# Patient Record
Sex: FEMALE | Race: BLACK OR AFRICAN AMERICAN | Employment: OTHER | URBAN - METROPOLITAN AREA
[De-identification: names, ages, dates, MRNs, and addresses within clinical notes are randomized per-mention and may not be internally consistent; named-entity substitution may affect disease eponyms.]

---

## 2017-02-23 RX ORDER — AMLODIPINE BESYLATE 10 MG/1
TABLET ORAL
Qty: 90 TABLET | Refills: 3 | Status: SHIPPED | OUTPATIENT
Start: 2017-02-23 | End: 2018-02-20 | Stop reason: SDUPTHER

## 2017-03-09 RX ORDER — SIMVASTATIN 20 MG
TABLET ORAL
Qty: 90 TABLET | Refills: 3 | Status: SHIPPED | OUTPATIENT
Start: 2017-03-09 | End: 2018-03-05 | Stop reason: SDUPTHER

## 2017-05-01 ENCOUNTER — OFFICE VISIT (OUTPATIENT)
Dept: INTERNAL MEDICINE | Age: 72
End: 2017-05-01

## 2017-05-01 VITALS
HEIGHT: 66 IN | SYSTOLIC BLOOD PRESSURE: 110 MMHG | WEIGHT: 234 LBS | HEART RATE: 70 BPM | BODY MASS INDEX: 37.61 KG/M2 | DIASTOLIC BLOOD PRESSURE: 70 MMHG | RESPIRATION RATE: 12 BRPM

## 2017-05-01 DIAGNOSIS — E78.5 HYPERLIPIDEMIA, UNSPECIFIED HYPERLIPIDEMIA TYPE: ICD-10-CM

## 2017-05-01 DIAGNOSIS — K63.5 COLON POLYPS: ICD-10-CM

## 2017-05-01 DIAGNOSIS — Z00.00 MEDICARE ANNUAL WELLNESS VISIT, SUBSEQUENT: Primary | ICD-10-CM

## 2017-05-01 DIAGNOSIS — Z13.29 ENCOUNTER FOR SCREENING FOR OTHER SUSPECTED ENDOCRINE DISORDER: ICD-10-CM

## 2017-05-01 DIAGNOSIS — E11.9 TYPE 2 DIABETES MELLITUS WITHOUT COMPLICATION, WITHOUT LONG-TERM CURRENT USE OF INSULIN (HCC): ICD-10-CM

## 2017-05-01 LAB
BILIRUBIN, POC: NORMAL
BLOOD URINE, POC: NORMAL
CLARITY, POC: CLEAR
COLOR, POC: YELLOW
CREATININE URINE: 197.5 MG/DL (ref 28–259)
GLUCOSE URINE, POC: NORMAL
KETONES, POC: NORMAL
LEUKOCYTE EST, POC: NORMAL
MICROALBUMIN UR-MCNC: 3.2 MG/DL
MICROALBUMIN/CREAT UR-RTO: 16.2 MG/G (ref 0–30)
NITRITE, POC: NORMAL
PH, POC: 5
PROTEIN, POC: NORMAL
SPECIFIC GRAVITY, POC: 1.02
UROBILINOGEN, POC: NORMAL

## 2017-05-01 PROCEDURE — G0439 PPPS, SUBSEQ VISIT: HCPCS | Performed by: INTERNAL MEDICINE

## 2017-05-01 PROCEDURE — 81002 URINALYSIS NONAUTO W/O SCOPE: CPT | Performed by: INTERNAL MEDICINE

## 2017-05-01 PROCEDURE — 93000 ELECTROCARDIOGRAM COMPLETE: CPT | Performed by: INTERNAL MEDICINE

## 2017-05-01 ASSESSMENT — LIFESTYLE VARIABLES: HOW OFTEN DO YOU HAVE A DRINK CONTAINING ALCOHOL: 0

## 2017-05-01 ASSESSMENT — ANXIETY QUESTIONNAIRES: GAD7 TOTAL SCORE: 2

## 2017-05-01 ASSESSMENT — PATIENT HEALTH QUESTIONNAIRE - PHQ9: SUM OF ALL RESPONSES TO PHQ QUESTIONS 1-9: 1

## 2017-05-02 LAB
ALBUMIN SERPL-MCNC: 4.5 G/DL (ref 3.5–5)
ALP BLD-CCNC: 97 IU/L (ref 35–135)
ALT SERPL-CCNC: 14 IU/L (ref 10–60)
AST SERPL-CCNC: 14 IU/L (ref 10–40)
BASOPHILS ABSOLUTE: 0 %
BASOPHILS ABSOLUTE: 0.04 THOU/MCL (ref 0.01–0.07)
BILIRUB SERPL-MCNC: 0.6 MG/DL (ref 0–1.2)
BUN BLDV-MCNC: 13 MG/DL (ref 8–26)
CALCIUM SERPL-MCNC: 9.8 MG/DL (ref 8.5–10.5)
CHLORIDE BLD-SCNC: 105 MEQ/L (ref 101–111)
CHOLESTEROL, TOTAL: 151 MG/DL
CHOLESTEROL/HDL RATIO: 2.7 (ref 1.9–4.2)
CO2: 23 MEQ/L (ref 24–36)
CREAT SERPL-MCNC: 0.97 MG/DL (ref 0.44–1.03)
CREATINE KINASE ISOENZYMES, SER/PLAS: 123 IU/L (ref 0–200)
EOSINOPHILS ABSOLUTE: 0.1 THOU/MCL (ref 0.03–0.45)
EOSINOPHILS RELATIVE PERCENT: 1 %
ESTIMATED AVERAGE GLUCOSE: 163 MG/DL
GFR AFRICAN AMERICAN: >60 ML/MIN/1.73 SQ METER
GFR NON-AFRICAN AMERICAN: 56 ML/MIN/1.73 SQ METER
GLUCOSE BLD-MCNC: 122 MG/DL (ref 70–99)
HBA1C MFR BLD: 7.3 % (ref 4–5.6)
HCT VFR BLD CALC: 39 % (ref 36–46)
HDLC SERPL-MCNC: 55 MG/DL
HEMOGLOBIN: 13.1 G/DL (ref 12–15.2)
LDL CHOLESTEROL CALCULATED: 71 MG/DL
LDL/HDL RATIO: 1.3 (ref 1–4)
LYMPHOCYTES ABSOLUTE: 1.84 THOU/MCL (ref 1–4)
LYMPHOCYTES RELATIVE PERCENT: 22 %
MCH RBC QN AUTO: 29 PG (ref 27–33)
MCHC RBC AUTO-ENTMCNC: 33 G/DL (ref 32–36)
MCV RBC AUTO: 87 FL (ref 82–97)
MONOCYTES # BLD: 7 %
MONOCYTES ABSOLUTE: 0.54 THOU/MCL (ref 0.2–0.9)
NEUTROPHILS ABSOLUTE: 5.7 THOU/MCL (ref 1.8–7.7)
PDW BLD-RTO: 14.8 %
PLATELET # BLD: 246 THOU/MCL (ref 140–375)
POTASSIUM SERPL-SCNC: 3.9 MEQ/L (ref 3.6–5.1)
RBC # BLD: 4.53 MIL/MCL (ref 3.8–5.2)
SEG NEUTROPHILS: 70 %
SODIUM BLD-SCNC: 144 MEQ/L (ref 135–145)
TOTAL PROTEIN: 8 G/DL (ref 6–8)
TRIGL SERPL-MCNC: 126 MG/DL
TSH ULTRASENSITIVE: 2.76 MIU/L (ref 0.4–4.2)
WBC # BLD: 8.2 THOU/MCL (ref 3.6–10.5)

## 2017-05-15 RX ORDER — LOSARTAN POTASSIUM 100 MG/1
TABLET ORAL
Qty: 90 TABLET | Refills: 3 | Status: SHIPPED | OUTPATIENT
Start: 2017-05-15 | End: 2018-05-08 | Stop reason: SDUPTHER

## 2017-08-10 ENCOUNTER — TELEPHONE (OUTPATIENT)
Dept: INTERNAL MEDICINE | Age: 72
End: 2017-08-10

## 2017-11-18 RX ORDER — GLIPIZIDE 5 MG/1
TABLET ORAL
Qty: 90 TABLET | Refills: 2 | Status: SHIPPED | OUTPATIENT
Start: 2017-11-18 | End: 2018-08-18 | Stop reason: SDUPTHER

## 2018-02-12 ENCOUNTER — TELEPHONE (OUTPATIENT)
Dept: INTERNAL MEDICINE | Age: 73
End: 2018-02-12

## 2018-02-13 ENCOUNTER — OFFICE VISIT (OUTPATIENT)
Dept: INTERNAL MEDICINE | Age: 73
End: 2018-02-13

## 2018-02-13 VITALS
BODY MASS INDEX: 38.57 KG/M2 | HEIGHT: 66 IN | WEIGHT: 240 LBS | DIASTOLIC BLOOD PRESSURE: 70 MMHG | RESPIRATION RATE: 12 BRPM | HEART RATE: 68 BPM | SYSTOLIC BLOOD PRESSURE: 130 MMHG

## 2018-02-13 DIAGNOSIS — R59.9 SWOLLEN GLAND: ICD-10-CM

## 2018-02-13 DIAGNOSIS — K11.1 PAROTID GLAND ENLARGEMENT: Primary | ICD-10-CM

## 2018-02-13 PROCEDURE — 99213 OFFICE O/P EST LOW 20 MIN: CPT | Performed by: INTERNAL MEDICINE

## 2018-02-13 RX ORDER — AMOXICILLIN 500 MG/1
500 CAPSULE ORAL 3 TIMES DAILY
Qty: 30 CAPSULE | Refills: 0 | Status: SHIPPED | OUTPATIENT
Start: 2018-02-13 | End: 2018-02-23

## 2018-02-13 NOTE — PROGRESS NOTES
COLONOSCOPY  *Aug.29, 2017 ( 2022 )    Dr. Meme Walls - hyperplastic polyp    HYSTERECTOMY, TOTAL ABDOMINAL  1984    SALPINGO-OOPHORECTOMY  1984    unilateral    UPPER GASTROINTESTINAL ENDOSCOPY  Aug., 2005    duodenal ulcer, gastric polyp     Current Outpatient Prescriptions   Medication Sig Dispense Refill    glipiZIDE (GLUCOTROL) 5 MG tablet TAKE ONE TABLET BY MOUTH DAILY 90 tablet 2    ONE TOUCH ULTRA TEST strip USE ONE STRIP TO TEST TWICE A  strip 12    losartan (COZAAR) 100 MG tablet TAKE ONE TABLET BY MOUTH DAILY 90 tablet 3    metFORMIN (GLUCOPHAGE) 500 MG tablet TAKE TWO TABLETS BY MOUTH EVERY MORNING 180 tablet 3    simvastatin (ZOCOR) 20 MG tablet TAKE ONE TABLET BY MOUTH DAILY 90 tablet 3    amLODIPine (NORVASC) 10 MG tablet TAKE ONE TABLET BY MOUTH DAILY 90 tablet 3     No current facility-administered medications for this visit. Allergies   Allergen Reactions    Aspirin     Lipitor      CPK       Physical Exam:   /70 (Site: Left Arm, Position: Sitting, Cuff Size: Medium Adult)   Pulse 68   Resp 12   Ht 5' 5.5\" (1.664 m)   Wt 240 lb (108.9 kg)   BMI 39.33 kg/m²   General appearance: alert, appears stated age and cooperative  Lungs: clear to auscultation bilaterally  Heart: regular rate and rhythm, S1, S2 normal, no murmur, click, rub or gallop  Extremities: extremities normal, atraumatic, no cyanosis or edema  Other: She has an enlarged right submental salivary gland and a small right posterior cervical lymph node. Throat is normal.     Lab Review: not applicable      Assessment: Enlarged right salivary gland. Plan:               Amoxicillin 500 mg po tid for 10 days along with sucking brad. Return in 2 weeks to repalpate the mass. If unchanged referral to ENT.       Mary Oviedo MD

## 2018-02-20 RX ORDER — AMLODIPINE BESYLATE 10 MG/1
TABLET ORAL
Qty: 90 TABLET | Refills: 3 | Status: SHIPPED | OUTPATIENT
Start: 2018-02-20 | End: 2019-02-17 | Stop reason: SDUPTHER

## 2018-02-26 ENCOUNTER — OFFICE VISIT (OUTPATIENT)
Dept: INTERNAL MEDICINE | Age: 73
End: 2018-02-26

## 2018-02-26 VITALS
DIASTOLIC BLOOD PRESSURE: 78 MMHG | HEIGHT: 66 IN | RESPIRATION RATE: 12 BRPM | BODY MASS INDEX: 38.89 KG/M2 | WEIGHT: 242 LBS | HEART RATE: 70 BPM | SYSTOLIC BLOOD PRESSURE: 128 MMHG

## 2018-02-26 DIAGNOSIS — K11.1 PAROTID GLAND ENLARGEMENT: Primary | ICD-10-CM

## 2018-02-26 PROCEDURE — 99213 OFFICE O/P EST LOW 20 MIN: CPT | Performed by: INTERNAL MEDICINE

## 2018-02-26 NOTE — PROGRESS NOTES
Chief Complaint   Patient presents with    Other     parotid gland enlargement        HPI:  She finished ten days of amoxicillin and lemon sucking with complete disappearance of the swollen right parotid gland.       Social History     Social History    Marital status:      Spouse name: Justina Delgado Number of children: 3    Years of education: N/A     Occupational History    hairdresser      Social History Main Topics    Smoking status: Former Smoker     Packs/day: 2.00     Years: 30.00     Types: Cigarettes     Quit date: 1/1/1986    Smokeless tobacco: Never Used    Alcohol use No    Drug use: Unknown    Sexual activity: Not on file     Other Topics Concern    Not on file     Social History Narrative    Living Will:  No.             Patient Active Problem List   Diagnosis    Fatty liver    Hyperlipidemia    Type 2 diabetes mellitus without complication, without long-term current use of insulin ([facility] [address]     Past Medical History:   Diagnosis Date    Diverticulosis 2004    Duodenal ulcer 2005    Fatty liver     Gastric polyp 2005    Hyperlipidemia     Hyperplastic colon polyp 2004    NIDDM (non-insulin dependent diabetes mellitus)     Osteopenia Nov., 2010 - DEXA    Lumbar T score 0.2 and Hip T score 0.1    Osteopenia DEXA - Aug., 2013    Lumbar T score -0.1 and Hip T score -0/2    Other screening mammogram Nov. 1, 2010    Negative    Other screening mammogram March 14, 2012    Negative [facility] )    Other screening mammogram May 2, 2013    Benign    Other screening mammogram Nov. 2, 2015    Negative     Screening mammogram, encounter for *November 21, 2016    Negative    Screening mammogram, encounter for *February 21, 2018    Benign    Villous adenoma 1999     Past Surgical History:   Procedure Laterality Date    COLONOSCOPY  Aug., 2007 ( 2012 )    Dr. Minda Carvalho - small hyperplastic polyp    COLONOSCOPY  April, 2012 ( 2017 )    Dr. Kinsey Gonzalez - diverticulosis, no polyps    COLONOSCOPY  *Aug.29,

## 2018-03-05 RX ORDER — SIMVASTATIN 20 MG
TABLET ORAL
Qty: 90 TABLET | Refills: 3 | Status: SHIPPED | OUTPATIENT
Start: 2018-03-05 | End: 2019-03-04 | Stop reason: SDUPTHER

## 2018-05-07 ENCOUNTER — TELEPHONE (OUTPATIENT)
Dept: INTERNAL MEDICINE | Age: 73
End: 2018-05-07

## 2018-05-08 RX ORDER — LOSARTAN POTASSIUM 100 MG/1
TABLET ORAL
Qty: 90 TABLET | Refills: 3 | Status: SHIPPED | OUTPATIENT
Start: 2018-05-08 | End: 2019-05-06 | Stop reason: CLARIF

## 2018-05-24 ENCOUNTER — TELEPHONE (OUTPATIENT)
Dept: INTERNAL MEDICINE | Age: 73
End: 2018-05-24

## 2018-05-25 ENCOUNTER — TELEPHONE (OUTPATIENT)
Dept: INTERNAL MEDICINE | Age: 73
End: 2018-05-25

## 2018-08-18 RX ORDER — GLIPIZIDE 5 MG/1
TABLET ORAL
Qty: 90 TABLET | Refills: 1 | Status: SHIPPED | OUTPATIENT
Start: 2018-08-18 | End: 2019-11-17

## 2018-08-20 RX ORDER — GLIPIZIDE 5 MG/1
TABLET ORAL
Qty: 90 TABLET | Refills: 3 | Status: SHIPPED | OUTPATIENT
Start: 2018-08-20 | End: 2019-05-06 | Stop reason: CLARIF

## 2019-02-18 RX ORDER — AMLODIPINE BESYLATE 10 MG/1
TABLET ORAL
Qty: 90 TABLET | Refills: 2 | Status: SHIPPED | OUTPATIENT
Start: 2019-02-18 | End: 2019-11-22 | Stop reason: SDUPTHER

## 2019-03-05 RX ORDER — SIMVASTATIN 20 MG
TABLET ORAL
Qty: 90 TABLET | Refills: 2 | Status: SHIPPED | OUTPATIENT
Start: 2019-03-05 | End: 2019-12-06 | Stop reason: SDUPTHER

## 2019-03-18 LAB — DIABETIC RETINOPATHY: NEGATIVE

## 2019-05-06 ENCOUNTER — OFFICE VISIT (OUTPATIENT)
Dept: INTERNAL MEDICINE CLINIC | Age: 74
End: 2019-05-06
Payer: MEDICARE

## 2019-05-06 VITALS
WEIGHT: 234 LBS | SYSTOLIC BLOOD PRESSURE: 120 MMHG | HEART RATE: 72 BPM | HEIGHT: 66 IN | DIASTOLIC BLOOD PRESSURE: 80 MMHG | BODY MASS INDEX: 37.61 KG/M2 | RESPIRATION RATE: 12 BRPM

## 2019-05-06 DIAGNOSIS — E11.21 TYPE 2 DIABETES MELLITUS WITH DIABETIC NEPHROPATHY, WITHOUT LONG-TERM CURRENT USE OF INSULIN (HCC): ICD-10-CM

## 2019-05-06 DIAGNOSIS — E78.5 HYPERLIPIDEMIA, UNSPECIFIED HYPERLIPIDEMIA TYPE: ICD-10-CM

## 2019-05-06 DIAGNOSIS — Z12.11 SPECIAL SCREENING FOR MALIGNANT NEOPLASMS, COLON: Primary | ICD-10-CM

## 2019-05-06 DIAGNOSIS — Z13.29 SCREENING FOR THYROID DISORDER: ICD-10-CM

## 2019-05-06 DIAGNOSIS — Z00.00 MEDICARE ANNUAL WELLNESS VISIT, SUBSEQUENT: Primary | ICD-10-CM

## 2019-05-06 LAB
A/G RATIO: 1.5 (ref 1.1–2.2)
ALBUMIN SERPL-MCNC: 4.6 G/DL (ref 3.4–5)
ALP BLD-CCNC: 103 U/L (ref 40–129)
ALT SERPL-CCNC: 19 U/L (ref 10–40)
ANION GAP SERPL CALCULATED.3IONS-SCNC: 15 MMOL/L (ref 3–16)
AST SERPL-CCNC: 15 U/L (ref 15–37)
BASOPHILS ABSOLUTE: 0.1 K/UL (ref 0–0.2)
BASOPHILS RELATIVE PERCENT: 1.1 %
BILIRUB SERPL-MCNC: 0.3 MG/DL (ref 0–1)
BILIRUBIN, POC: NORMAL
BLOOD URINE, POC: NORMAL
BUN BLDV-MCNC: 11 MG/DL (ref 7–20)
CALCIUM SERPL-MCNC: 10.6 MG/DL (ref 8.3–10.6)
CHLORIDE BLD-SCNC: 106 MMOL/L (ref 99–110)
CHOLESTEROL, TOTAL: 152 MG/DL (ref 0–199)
CLARITY, POC: CLEAR
CO2: 25 MMOL/L (ref 21–32)
COLOR, POC: YELLOW
CREAT SERPL-MCNC: 1 MG/DL (ref 0.6–1.2)
EOSINOPHILS ABSOLUTE: 0.1 K/UL (ref 0–0.6)
EOSINOPHILS RELATIVE PERCENT: 1.5 %
GFR AFRICAN AMERICAN: >60
GFR NON-AFRICAN AMERICAN: 54
GLOBULIN: 3 G/DL
GLUCOSE BLD-MCNC: 106 MG/DL (ref 70–99)
GLUCOSE URINE, POC: NORMAL
HCT VFR BLD CALC: 38.9 % (ref 36–48)
HDLC SERPL-MCNC: 60 MG/DL (ref 40–60)
HEMOGLOBIN: 12.7 G/DL (ref 12–16)
KETONES, POC: NORMAL
LDL CHOLESTEROL CALCULATED: 72 MG/DL
LEUKOCYTE EST, POC: NORMAL
LYMPHOCYTES ABSOLUTE: 1.8 K/UL (ref 1–5.1)
LYMPHOCYTES RELATIVE PERCENT: 23.7 %
MCH RBC QN AUTO: 29 PG (ref 26–34)
MCHC RBC AUTO-ENTMCNC: 32.6 G/DL (ref 31–36)
MCV RBC AUTO: 88.9 FL (ref 80–100)
MONOCYTES ABSOLUTE: 0.6 K/UL (ref 0–1.3)
MONOCYTES RELATIVE PERCENT: 7.8 %
NEUTROPHILS ABSOLUTE: 5.1 K/UL (ref 1.7–7.7)
NEUTROPHILS RELATIVE PERCENT: 65.9 %
NITRITE, POC: NORMAL
PDW BLD-RTO: 15.8 % (ref 12.4–15.4)
PH, POC: 5
PLATELET # BLD: 246 K/UL (ref 135–450)
PMV BLD AUTO: 9.6 FL (ref 5–10.5)
POTASSIUM SERPL-SCNC: 4.9 MMOL/L (ref 3.5–5.1)
PROTEIN, POC: NORMAL
RBC # BLD: 4.38 M/UL (ref 4–5.2)
SODIUM BLD-SCNC: 146 MMOL/L (ref 136–145)
SPECIFIC GRAVITY, POC: 1.02
TOTAL CK: 141 U/L (ref 26–192)
TOTAL PROTEIN: 7.6 G/DL (ref 6.4–8.2)
TRIGL SERPL-MCNC: 100 MG/DL (ref 0–150)
TSH SERPL DL<=0.05 MIU/L-ACNC: 3.9 UIU/ML (ref 0.27–4.2)
UROBILINOGEN, POC: NORMAL
VLDLC SERPL CALC-MCNC: 20 MG/DL
WBC # BLD: 7.8 K/UL (ref 4–11)

## 2019-05-06 PROCEDURE — G0439 PPPS, SUBSEQ VISIT: HCPCS | Performed by: INTERNAL MEDICINE

## 2019-05-06 PROCEDURE — 93000 ELECTROCARDIOGRAM COMPLETE: CPT | Performed by: INTERNAL MEDICINE

## 2019-05-06 PROCEDURE — 81002 URINALYSIS NONAUTO W/O SCOPE: CPT | Performed by: INTERNAL MEDICINE

## 2019-05-06 ASSESSMENT — LIFESTYLE VARIABLES: HOW OFTEN DO YOU HAVE A DRINK CONTAINING ALCOHOL: 0

## 2019-05-06 ASSESSMENT — PATIENT HEALTH QUESTIONNAIRE - PHQ9
SUM OF ALL RESPONSES TO PHQ QUESTIONS 1-9: 2
SUM OF ALL RESPONSES TO PHQ QUESTIONS 1-9: 2

## 2019-05-06 ASSESSMENT — ANXIETY QUESTIONNAIRES: GAD7 TOTAL SCORE: 2

## 2019-05-06 NOTE — PROGRESS NOTES
Rolly Tracey 76 y.o. presents today with   Chief Complaint   Patient presents with    Medicare AWV       Family History   Problem Relation Age of Onset    Cancer Father 59        , colon cancer.  Cancer Mother 68        , nasopharyngeal cancer, IDDM, hypertension.        Social History     Socioeconomic History    Marital status:      Spouse name: Hamilton Morataya Number of children: 3    Years of education: Not on file    Highest education level: Not on file   Occupational History    Occupation: [employer]   Social Needs    Financial resource strain: Not on file    Food insecurity:     Worry: Not on file     Inability: Not on file   Price[employer] needs:     Medical: Not on file     Non-medical: Not on file   Tobacco Use    Smoking status: Former Smoker     Packs/day: 2.00     Years: 30.00     Pack years: 60.00     Types: Cigarettes     Last attempt to quit: 1986     Years since quittin.3    Smokeless tobacco: Never Used   Substance and Sexual Activity    Alcohol use: No     Alcohol/week: 0.0 oz    Drug use: No    Sexual activity: Not on file   Lifestyle    Physical activity:     Days per week: Not on file     Minutes per session: Not on file    Stress: Not on file   Relationships    Social connections:     Talks on phone: Not on file     Gets together: Not on file     Attends [employer] service: Not on file     Active member of club or organization: Not on file     Attends meetings of clubs or organizations: Not on file     Relationship status: Not on file    Intimate partner violence:     Fear of current or ex partner: Not on file     Emotionally abused: Not on file     Physically abused: Not on file     Forced sexual activity: Not on file   Other Topics Concern    Not on file   Social History Narrative    Living Will:  No.               Patient Active Problem List   Diagnosis    Fatty liver    Hyperlipidemia    Type 2 diabetes mellitus without complication, without long-term current use of insulin ([facility] [address]       Past Medical History:   Diagnosis Date    Diverticulosis 2004    Duodenal ulcer 2005    Fatty liver     Gastric polyp 2005    Hyperlipidemia     Hyperplastic colon polyp 2004    NIDDM (non-insulin dependent diabetes mellitus)     Osteopenia Nov., 2010 - DEXA    Lumbar T score 0.2 and Hip T score 0.1    Osteopenia DEXA - Aug., 2013    Lumbar T score -0.1 and Hip T score -0/2    Other screening mammogram Nov. 1, 2010    Negative    Other screening mammogram March 14, 2012    Negative [facility] )    Other screening mammogram May 2, 2013    Benign    Other screening mammogram Nov. 2, 2015    Negative     Screening mammogram, encounter for *November 21, 2016    Negative    Screening mammogram, encounter for *February 21, 2018    Benign    Screening mammogram, encounter for *April 29, 2019    Benign    Villous adenoma 1999        Past Surgical History:   Procedure Laterality Date    COLONOSCOPY  Aug., 2007 ( 2012 )    Dr. Jayne Viveros - small hyperplastic polyp    COLONOSCOPY  April, 2012 ( 2017 )    Dr. Lily Cordoba - diverticulosis, no polyps    COLONOSCOPY  *Aug.29, 2017 ( 2022 )    Dr. Lily Cordoba - hyperplastic polyp    HYSTERECTOMY, [facility] SALPINGO-OOPHORECTOMY  1984    unilateral    UPPER GASTROINTESTINAL ENDOSCOPY  Aug., 2005    duodenal ulcer, gastric polyp       Current Outpatient Medications   Medication Sig Dispense Refill    metFORMIN (GLUCOPHAGE) 500 MG tablet TAKE TWO TABLETS BY MOUTH EVERY MORNING 180 tablet 3    simvastatin (ZOCOR) 20 MG tablet TAKE ONE TABLET BY MOUTH DAILY 90 tablet 2    amLODIPine (NORVASC) 10 MG tablet TAKE ONE TABLET BY MOUTH DAILY 90 tablet 2    ONE TOUCH ULTRA TEST strip USE ONE STRIP TO TEST TWO TIMES A  strip 12    glipiZIDE (GLUCOTROL) 5 MG tablet TAKE ONE TABLET BY MOUTH DAILY 90 tablet 1     No current facility-administered medications for this visit.         Allergies   Allergen Reactions    Aspirin  Lipitor      CPK       Review of Systems:     Immunization History   Administered Date(s) Administered    Influenza, High Dose (Fluzone 65 yrs and older) 11/07/2016, 10/27/2017, 10/16/2018    Pneumococcal 13-valent Conjugate ([id number]) 04/20/2016    Pneumococcal Polysaccharide ([id number]) 02/11/2008, 08/20/2013    Td, unspecified formulation 11/01/2010    Zoster Live (Zostavax) 09/10/2013     Eye Exam:  March 18, 2019 by Dr. Vidya Feliciano at [address]  Chest: Denies: cough, hemoptysis, shortness of breath, pleuritic chest pain, wheezing  Cardiovascular: Denies: chest pain, dyspnea on exertion, orthopnea, paroxysmal nocturnal dyspnea, edema, palpitations  Abdomen: no abdominal pain, change in bowel habits, or black or bloody stools  Colonoscopy:  August, 2017 by Dr. Estella Del Toro revealed hyperplastic polyps. To be repeated 2022  Fall Risk low  ADL   Without assistance  Mammography:April 29, 2019  DEXA: August, 2013 revealed a lumbar T score of -0.1 and a hip T score of -0.2  Pelvic and PAP: Overdue  Other:  N/A      Physical Exam:  General appearance: alert, appears stated age and cooperative  /80 (Site: Left Upper Arm, Position: Sitting, Cuff Size: Medium Adult)   Pulse 72   Resp 12   Ht 5' 6\" (1.676 m)   Wt 234 lb (106.1 kg)   BMI 37.77 kg/m²   Eyes: conjunctivae/corneas clear. PERRL, EOM's intact. Fundi benign. Ears: normal TM's and external ear canals both ears  Nose: Nares normal. Septum midline. Mucosa normal. No drainage or sinus tenderness.   Throat: no abnormalities  Neck: no adenopathy, no carotid bruit, no JVD, supple, symmetrical, trachea midline and thyroid not enlarged, symmetric, no tenderness/mass/nodules  Nodes:no adenopathy palpable  Breasts:Breasts symmetrical, skin without lesion(s), no nipple retraction or dimpling, no nipple discharge, no masses palpated, no axillary or supraclavicular adenopathy  Lungs: clear to auscultation bilaterally  Heart: regular rate and rhythm, S1, S2 normal, no murmur, click, rub or gallop  Abdomen: soft, non-tender; bowel sounds normal; no masses,  no organomegaly  Extremities: extremities normal, atraumatic, no cyanosis or edema  Neurological: Gait normal. Reflexes normal and symmetric. Sensation grossly normal  Pulse: radial=4/4, femoral=4/4, popliteal=4/4, dorsalis pedis=4/4,   Skin: normal coloration and turgor, no rashes, no suspicious skin lesions noted  Psych: normal    Lab Review: not applicable  Assessment: Stable    Plan:              Zoster, ECG, UA and microalbumin, fasting labs, and hemoccults  Type 2 diabetes - await labs, same regimen pending results    Hyperlipidemia - await labs, same regimen pending results    Fatty liver - await labs, continue diet, exercise, and weight loss  Falls - mechanical, continue to monitor  Fatigue - ongoing, await labs  Advised to get a living will  Exercise - advised to do 150 minutes of aerobic exercise per week  Hearing - ongoing, continue to monitor  Vision - chronic, continue yearly eye exams  Advised to fasten and secure all throw rugs  Riley Fresh received counseling on the following healthy behaviors: nutrition and exercise    Discussed use, benefit, and side effects of prescribed medications. Barriers to medication compliance addressed. All patient questions answered. Pt voiced understanding. Barriers to success: lack of motivation  Plan for overcoming my barriers: Education     Confidence: 2/10  Date goal set: 5/6/19  Date goal attained: Ongoing           JEMIMA Trejo MD

## 2019-05-07 LAB
CREATININE URINE: 184.7 MG/DL (ref 28–259)
ESTIMATED AVERAGE GLUCOSE: 177.2 MG/DL
HBA1C MFR BLD: 7.8 %
MICROALBUMIN UR-MCNC: 3.6 MG/DL
MICROALBUMIN/CREAT UR-RTO: 19.5 MG/G (ref 0–30)

## 2019-05-17 DIAGNOSIS — Z12.11 SPECIAL SCREENING FOR MALIGNANT NEOPLASMS, COLON: ICD-10-CM

## 2019-05-17 LAB
CONTROL: NORMAL
HEMOCCULT STL QL: NORMAL

## 2019-05-17 PROCEDURE — 82274 ASSAY TEST FOR BLOOD FECAL: CPT | Performed by: INTERNAL MEDICINE

## 2019-08-19 ENCOUNTER — TELEPHONE (OUTPATIENT)
Dept: INTERNAL MEDICINE CLINIC | Age: 74
End: 2019-08-19

## 2019-08-23 ENCOUNTER — TELEPHONE (OUTPATIENT)
Dept: INTERNAL MEDICINE CLINIC | Age: 74
End: 2019-08-23

## 2019-08-23 NOTE — TELEPHONE ENCOUNTER
Giulia Yung called -#[phone]  Her mom was released from the hospital Wednesday   She wants a f/u appt with you   When do you want to see her?

## 2019-08-29 ENCOUNTER — OFFICE VISIT (OUTPATIENT)
Dept: INTERNAL MEDICINE CLINIC | Age: 74
End: 2019-08-29
Payer: MEDICARE

## 2019-08-29 VITALS
SYSTOLIC BLOOD PRESSURE: 112 MMHG | HEART RATE: 70 BPM | RESPIRATION RATE: 12 BRPM | BODY MASS INDEX: 36.64 KG/M2 | HEIGHT: 66 IN | DIASTOLIC BLOOD PRESSURE: 74 MMHG | WEIGHT: 228 LBS

## 2019-08-29 DIAGNOSIS — J18.9 PNEUMONIA OF RIGHT LUNG DUE TO INFECTIOUS ORGANISM, UNSPECIFIED PART OF LUNG: Primary | ICD-10-CM

## 2019-08-29 PROCEDURE — 99213 OFFICE O/P EST LOW 20 MIN: CPT | Performed by: INTERNAL MEDICINE

## 2019-11-17 RX ORDER — GLIPIZIDE 5 MG/1
TABLET ORAL
Qty: 90 TABLET | Refills: 2 | Status: SHIPPED | OUTPATIENT
Start: 2019-11-17 | End: 2020-04-13 | Stop reason: SDUPTHER

## 2019-11-22 RX ORDER — AMLODIPINE BESYLATE 10 MG/1
TABLET ORAL
Qty: 90 TABLET | Refills: 3 | Status: SHIPPED | OUTPATIENT
Start: 2019-11-22 | End: 2020-11-17 | Stop reason: SDUPTHER

## 2019-11-26 ENCOUNTER — OFFICE VISIT (OUTPATIENT)
Dept: INTERNAL MEDICINE CLINIC | Age: 74
End: 2019-11-26
Payer: MEDICARE

## 2019-11-26 VITALS
WEIGHT: 235 LBS | BODY MASS INDEX: 37.77 KG/M2 | SYSTOLIC BLOOD PRESSURE: 132 MMHG | RESPIRATION RATE: 12 BRPM | DIASTOLIC BLOOD PRESSURE: 80 MMHG | HEART RATE: 66 BPM | HEIGHT: 66 IN

## 2019-11-26 DIAGNOSIS — E78.5 HYPERLIPIDEMIA, UNSPECIFIED HYPERLIPIDEMIA TYPE: ICD-10-CM

## 2019-11-26 DIAGNOSIS — E11.21 TYPE 2 DIABETES MELLITUS WITH DIABETIC NEPHROPATHY, WITHOUT LONG-TERM CURRENT USE OF INSULIN (HCC): ICD-10-CM

## 2019-11-26 DIAGNOSIS — E11.21 TYPE 2 DIABETES MELLITUS WITH DIABETIC NEPHROPATHY, WITHOUT LONG-TERM CURRENT USE OF INSULIN (HCC): Primary | ICD-10-CM

## 2019-11-26 LAB
ALT SERPL-CCNC: 15 U/L (ref 10–40)
ANION GAP SERPL CALCULATED.3IONS-SCNC: 18 MMOL/L (ref 3–16)
AST SERPL-CCNC: 23 U/L (ref 15–37)
BUN BLDV-MCNC: 11 MG/DL (ref 7–20)
CALCIUM SERPL-MCNC: 10.1 MG/DL (ref 8.3–10.6)
CHLORIDE BLD-SCNC: 106 MMOL/L (ref 99–110)
CHOLESTEROL, TOTAL: 149 MG/DL (ref 0–199)
CO2: 22 MMOL/L (ref 21–32)
CREAT SERPL-MCNC: 0.8 MG/DL (ref 0.6–1.2)
GFR AFRICAN AMERICAN: >60
GFR NON-AFRICAN AMERICAN: >60
GLUCOSE BLD-MCNC: 115 MG/DL (ref 70–99)
HDLC SERPL-MCNC: 57 MG/DL (ref 40–60)
LDL CHOLESTEROL CALCULATED: 71 MG/DL
POTASSIUM SERPL-SCNC: 4.9 MMOL/L (ref 3.5–5.1)
SODIUM BLD-SCNC: 146 MMOL/L (ref 136–145)
TOTAL CK: 281 U/L (ref 26–192)
TRIGL SERPL-MCNC: 103 MG/DL (ref 0–150)
VLDLC SERPL CALC-MCNC: 21 MG/DL

## 2019-11-26 PROCEDURE — 99213 OFFICE O/P EST LOW 20 MIN: CPT | Performed by: INTERNAL MEDICINE

## 2019-11-27 LAB
ESTIMATED AVERAGE GLUCOSE: 177.2 MG/DL
HBA1C MFR BLD: 7.8 %

## 2019-12-07 RX ORDER — SIMVASTATIN 20 MG
TABLET ORAL
Qty: 90 TABLET | Refills: 0 | Status: SHIPPED | OUTPATIENT
Start: 2019-12-07 | End: 2020-03-04

## 2020-03-02 ENCOUNTER — TELEPHONE (OUTPATIENT)
Dept: INTERNAL MEDICINE CLINIC | Age: 75
End: 2020-03-02

## 2020-03-03 LAB
ESTIMATED AVERAGE GLUCOSE: 174 MG/DL
GLUCOSE BLD-MCNC: 140 MG/DL (ref 70–99)
HBA1C MFR BLD: 7.7 % (ref 4.2–5.6)

## 2020-03-04 RX ORDER — SIMVASTATIN 20 MG
TABLET ORAL
Qty: 90 TABLET | Refills: 0 | Status: SHIPPED | OUTPATIENT
Start: 2020-03-04 | End: 2020-06-03

## 2020-04-13 RX ORDER — GLIPIZIDE 5 MG/1
10 TABLET ORAL DAILY
Qty: 180 TABLET | Refills: 3 | Status: SHIPPED | OUTPATIENT
Start: 2020-04-13 | End: 2021-04-08 | Stop reason: SDUPTHER

## 2020-06-03 RX ORDER — SIMVASTATIN 20 MG
TABLET ORAL
Qty: 90 TABLET | Refills: 3 | Status: SHIPPED | OUTPATIENT
Start: 2020-06-03 | End: 2021-06-08 | Stop reason: SDUPTHER

## 2020-07-16 ENCOUNTER — TELEPHONE (OUTPATIENT)
Dept: INTERNAL MEDICINE CLINIC | Age: 75
End: 2020-07-16

## 2020-07-16 NOTE — TELEPHONE ENCOUNTER
SOB - she contributes it to broke out from being around flowers, took Benadryl  SOB just sitting, worse when she walks or any exertion. In the ER in September they suggested she may have COPD and may need an inhaler, also suggested a stress test to check her heart.

## 2020-10-26 ENCOUNTER — OFFICE VISIT (OUTPATIENT)
Dept: FAMILY MEDICINE CLINIC | Age: 75
End: 2020-10-26
Payer: MEDICARE

## 2020-10-26 VITALS
DIASTOLIC BLOOD PRESSURE: 68 MMHG | BODY MASS INDEX: 37.45 KG/M2 | SYSTOLIC BLOOD PRESSURE: 126 MMHG | HEART RATE: 78 BPM | WEIGHT: 232 LBS | TEMPERATURE: 97.7 F | OXYGEN SATURATION: 97 %

## 2020-10-26 VITALS — WEIGHT: 232 LBS | HEIGHT: 66 IN | BODY MASS INDEX: 37.28 KG/M2

## 2020-10-26 PROBLEM — M17.12 PRIMARY OSTEOARTHRITIS OF LEFT KNEE: Status: ACTIVE | Noted: 2018-09-20

## 2020-10-26 PROBLEM — E66.01 SEVERE OBESITY (BMI 35.0-39.9) WITH COMORBIDITY (HCC): Status: ACTIVE | Noted: 2018-09-20

## 2020-10-26 PROCEDURE — G0439 PPPS, SUBSEQ VISIT: HCPCS | Performed by: NURSE PRACTITIONER

## 2020-10-26 PROCEDURE — 3051F HG A1C>EQUAL 7.0%<8.0%: CPT | Performed by: NURSE PRACTITIONER

## 2020-10-26 RX ORDER — LATANOPROST 50 UG/ML
SOLUTION/ DROPS OPHTHALMIC
COMMUNITY
Start: 2020-10-20 | End: 2021-05-17

## 2020-10-26 RX ORDER — ALBUTEROL SULFATE 90 UG/1
AEROSOL, METERED RESPIRATORY (INHALATION)
COMMUNITY
Start: 2020-07-16 | End: 2022-06-22

## 2020-10-26 ASSESSMENT — PATIENT HEALTH QUESTIONNAIRE - PHQ9
SUM OF ALL RESPONSES TO PHQ QUESTIONS 1-9: 0
SUM OF ALL RESPONSES TO PHQ9 QUESTIONS 1 & 2: 0
2. FEELING DOWN, DEPRESSED OR HOPELESS: 0
1. LITTLE INTEREST OR PLEASURE IN DOING THINGS: 0
SUM OF ALL RESPONSES TO PHQ QUESTIONS 1-9: 0
SUM OF ALL RESPONSES TO PHQ9 QUESTIONS 1 & 2: 0
2. FEELING DOWN, DEPRESSED OR HOPELESS: 0
SUM OF ALL RESPONSES TO PHQ QUESTIONS 1-9: 0
1. LITTLE INTEREST OR PLEASURE IN DOING THINGS: 0
SUM OF ALL RESPONSES TO PHQ QUESTIONS 1-9: 0

## 2020-10-26 ASSESSMENT — LIFESTYLE VARIABLES: HOW OFTEN DO YOU HAVE A DRINK CONTAINING ALCOHOL: 0

## 2020-10-26 NOTE — PATIENT INSTRUCTIONS
Body Mass Index: Care Instructions  Your Care Instructions     Body mass index (BMI) can help you see if your weight is raising your risk for health problems. It uses a formula to compare how much you weigh with how tall you are. · A BMI lower than 18.5 is considered underweight. · A BMI between 18.5 and 24.9 is considered healthy. · A BMI between 25 and 29.9 is considered overweight. A BMI of 30 or higher is considered obese. If your BMI is in the normal range, it means that you have a lower risk for weight-related health problems. If your BMI is in the overweight or obese range, you may be at increased risk for weight-related health problems, such as high blood pressure, heart disease, stroke, arthritis or joint pain, and diabetes. If your BMI is in the underweight range, you may be at increased risk for health problems such as fatigue, lower protection (immunity) against illness, muscle loss, bone loss, hair loss, and hormone problems. BMI is just one measure of your risk for weight-related health problems. You may be at higher risk for health problems if you are not active, you eat an unhealthy diet, or you drink too much alcohol or use tobacco products. Follow-up care is a key part of your treatment and safety. Be sure to make and go to all appointments, and call your doctor if you are having problems. It's also a good idea to know your test results and keep a list of the medicines you take. How can you care for yourself at home? · Practice healthy eating habits. This includes eating plenty of fruits, vegetables, whole grains, lean protein, and low-fat dairy. · If your doctor recommends it, get more exercise. Walking is a good choice. Bit by bit, increase the amount you walk every day. Try for at least 30 minutes on most days of the week. · Do not smoke. Smoking can increase your risk for health problems. If you need help quitting, talk to your doctor about stop-smoking programs and medicines. These can increase your chances of quitting for good. · Limit alcohol to 2 drinks a day for men and 1 drink a day for women. Too much alcohol can cause health problems. If you have a BMI higher than 25  · Your doctor may do other tests to check your risk for weight-related health problems. This may include measuring the distance around your waist. A waist measurement of more than 40 inches in men or 35 inches in women can increase the risk of weight-related health problems. · Talk with your doctor about steps you can take to stay healthy or improve your health. You may need to make lifestyle changes to lose weight and stay healthy, such as changing your diet and getting regular exercise. If you have a BMI lower than 18.5  · Your doctor may do other tests to check your risk for health problems. · Talk with your doctor about steps you can take to stay healthy or improve your health. You may need to make lifestyle changes to gain or maintain weight and stay healthy, such as getting more healthy foods in your diet and doing exercises to build muscle. Where can you learn more? Go to https://Omnireliantfrancia.PLAYD8. org and sign in to your [employer] account. Enter S176 in the [place] box to learn more about \"Body Mass Index: Care Instructions. \"     If you do not have an account, please click on the \"Sign Up Now\" link. Current as of: December 11, 2019               Content Version: 12.6  © 1074-6732 [employer]. Care instructions adapted under license by [facility] ([facility]). If you have questions about a medical condition or this instruction, always ask your healthcare professional. [place] [address] any warranty or liability for your use of this information. Personalized Preventive Plan for [place] - 10/26/2020  Medicare offers a range of preventive health benefits.  Some of the tests and screenings are paid in full while other may be subject to a deductible, co-insurance, and/or copay. Some of these benefits include a comprehensive review of your medical history including lifestyle, illnesses that may run in your family, and various assessments and screenings as appropriate. After reviewing your medical record and screening and assessments performed today your provider may have ordered immunizations, labs, imaging, and/or referrals for you. A list of these orders (if applicable) as well as your Preventive Care list are included within your After Visit Summary for your review. Other Preventive Recommendations:    · A preventive eye exam performed by an eye specialist is recommended every 1-2 years to screen for glaucoma; cataracts, macular degeneration, and other eye disorders. · A preventive dental visit is recommended every 6 months. · Try to get at least 150 minutes of exercise per week or 10,000 steps per day on a pedometer . · Order or download the FREE \"Exercise & Physical Activity: Your Everyday Guide\" from [employer]. Call 2-943.680.8243 or search [employer] online. · You need 4738-1155 mg of calcium and 5312-5128 IU of vitamin D per day. It is possible to meet your calcium requirement with diet alone, but a vitamin D supplement is usually necessary to meet this goal.  · When exposed to the sun, use a sunscreen that protects against both UVA and UVB radiation with an SPF of 30 or greater. Reapply every 2 to 3 hours or after sweating, drying off with a towel, or swimming. · Always wear a seat belt when traveling in a car. Always wear a helmet when riding a bicycle or motorcycle.

## 2020-10-26 NOTE — PROGRESS NOTES
Medicare Annual Wellness Visit  Name: Hafsa Dale Date: 10/26/2020   MRN: [id number] Sex: Female   Age: 76 y.o. Ethnicity: Non-/Non    : 1945 Race: Kenzie Tracey is here for Medicare AWV    Screenings for behavioral, psychosocial and functional/safety risks, and cognitive dysfunction are all negative except as indicated below. These results, as well as other patient data from the [address] form, are documented in Flowsheets linked to this Encounter. Allergies   Allergen Reactions    Aspirin Other (See Comments)     Ulcers    Lipitor      CPK         Prior to Visit Medications    Medication Sig Taking?  Authorizing Provider   blood glucose test strips (ASCENSIA AUTODISC VI;ONE TOUCH ULTRA TEST VI) strip  Yes Historical Provider, MD   albuterol sulfate  (90 Base) MCG/ACT inhaler  Yes Historical Provider, MD   latanoprost (XALATAN) 0.005 % ophthalmic solution  Yes Historical Provider, MD   simvastatin (ZOCOR) 20 MG tablet TAKE ONE TABLET BY MOUTH DAILY Yes TREVIN Silverman MD   glipiZIDE (GLUCOTROL) 5 MG tablet Take 2 tablets by mouth daily Yes Antonio Harrison MD   metFORMIN (GLUCOPHAGE) 500 MG tablet TAKE TWO TABLETS BY MOUTH EVERY MORNING Yes Antonio Harrison MD   ONE TOUCH ULTRA TEST strip USE 1 STRIP TO TEST TWO TIMES A DAY Yes Antonio Harrison MD   amLODIPine ([address]) 10 MG tablet TAKE ONE TABLET BY MOUTH DAILY Yes Antonio Harrison MD         Past Medical History:   Diagnosis Date    Diverticulosis 2004    Duodenal ulcer 2005    Fatty liver     Gastric polyp 2005    Hyperlipidemia     Hyperplastic colon polyp     NIDDM (non-insulin dependent diabetes mellitus)     Osteopenia 2010 - DEXA    Lumbar T score 0.2 and Hip T score 0.1    Osteopenia DEXA - Aug., 2013    Lumbar T score -0.1 and Hip T score -0/2    Other screening mammogram 2010    Negative    Other screening mammogram 2012    Negative ( [facility] )    Other screening mammogram May 2, 2013    Benign    Other screening mammogram 2015    Negative     Screening mammogram, encounter for *2016    Negative    Screening mammogram, encounter for *2018    Benign    Screening mammogram, encounter for *2019    Benign    Villous adenoma        Past Surgical History:   Procedure Laterality Date    COLONOSCOPY  Aug., 2007 (  )    Dr. Zeinab Faria - small hyperplastic polyp    COLONOSCOPY   (  )    Dr. Tori Cope - diverticulosis, no polyps    COLONOSCOPY  *Aug.29, 2017 (  )    Dr. Tori Cope - hyperplastic polyp    HYSTERECTOMY, [address] [facility] SALPINGO-OOPHORECTOMY  1984    unilateral    UPPER GASTROINTESTINAL ENDOSCOPY  Aug., 2005    duodenal ulcer, gastric polyp         Family History   Problem Relation Age of Onset    Cancer Father 59        , colon cancer.  Cancer Mother 68        , nasopharyngeal cancer, IDDM, hypertension. CareTeam (Including outside providers/suppliers regularly involved in providing care):   Patient Care Team:  BARD Fowler CNP as PCP - General  BRAD Fowler CNP as PCP - [facility] Lida Hart Provider    Wt Readings from Last 3 Encounters:   10/26/20 232 lb (105.2 kg)   10/26/20 232 lb (105.2 kg)   19 235 lb (106.6 kg)     Vitals:    10/26/20 1143   Weight: 232 lb (105.2 kg)   Height: 5' 6\" (1.676 m)     Body mass index is 37.45 kg/m². Based upon direct observation of the patient, evaluation of cognition reveals recent and remote memory intact.     General Appearance: alert and oriented to person, place and time, well developed and well- nourished, in no acute distress  Skin: warm and dry, no rash or erythema  Head: normocephalic and atraumatic  Eyes: pupils equal, round, and reactive to light, extraocular eye movements intact, conjunctivae normal  Pulmonary/Chest: clear to auscultation bilaterally- no wheezes, rales or rhonchi, normal air movement, no respiratory distress  Cardiovascular: normal rate, regular rhythm, normal S1 and S2, no murmurs, rubs, clicks, or gallops, distal pulses intact, no carotid bruits  Abdomen: soft, non-tender, non-distended, normal bowel sounds, no masses or organomegaly  Extremities: no cyanosis, clubbing or edema  Musculoskeletal: normal range of motion, no joint swelling, deformity or tenderness  Neurologic: reflexes normal and symmetric, no cranial nerve deficit, gait, coordination and speech normal    Patient's complete Health Risk Assessment and screening values have been reviewed and are found in Flowsheets. The following problems were reviewed today and where indicated follow up appointments were made and/or referrals ordered. Positive Risk Factor Screenings with Interventions:       General Health and ACP:  General  In general, how would you say your health is?: Good  In the past 7 days, have you experienced any of the following?  New or Increased Pain, New or Increased Fatigue, Loneliness, Social Isolation, Stress or Anger?: (!) New or Increased Pain(hip/groin pain)  Do you get the social and emotional support that you need?: Yes  Do you have a Living Will?: Yes  Advance Directives     Power of  Living Will ACP-Advance Directive ACP-Power of     Not on File Not on File Filed [address] Interventions:  · none indicated    Health Habits/Nutrition:  Health Habits/Nutrition  Do you exercise for at least 20 minutes 2-3 times per week?: (!) No  Have you lost any weight without trying in the past 3 months?: No  Do you eat fewer than 2 meals per day?: No  Have you seen a dentist within the past year?: Yes  Body mass index: (!) 37.44  Health Habits/Nutrition Interventions:  · Inadequate physical activity:  patient is not ready to increase his/her physical activity level at this time    Hearing/Vision:  No exam data present  Hearing/Vision  Do you or your family notice any trouble with your hearing?: No  Do you have difficulty driving, watching TV, or doing any of your daily activities because of your eyesight?: (!) Yes(increased pressure/cataracts)  Have you had an eye exam within the past year?: Yes  Hearing/Vision Interventions:  · Vision concerns:  patient encouraged to make appointment with his/her eye specialist    Personalized Preventive Plan   Current Health Maintenance Status  Immunization History   Administered Date(s) Administered    Influenza, High Dose (Fluzone 65 yrs and older) 11/07/2016, 10/27/2017, 10/16/2018, 09/25/2019, 10/02/2020    Influenza, High-dose, Quadv, 65 yrs +, IM (Fluzone) 09/29/2020    Pneumococcal Conjugate 13-valent ([id number]) 04/20/2016    Pneumococcal Polysaccharide ([id number]) 02/11/2008, 08/20/2013    Td, unspecified formulation 11/01/2010    Zoster Live (Zostavax) 09/10/2013    Zoster Recombinant (Shingrix) 09/25/2019, 02/10/2020        Health Maintenance   Topic Date Due    Annual Wellness Visit (AWV)  05/29/2019    Diabetic foot exam  05/06/2020    A1C test (Diabetic or Prediabetic)  06/03/2020    DTaP/Tdap/Td vaccine (1 - Tdap) 11/01/2020 (Originally 4/26/1964)    Lipid screen  11/26/2020    Diabetic retinal exam  03/18/2021    Colon cancer screen colonoscopy  08/29/2022    Flu vaccine  Completed    Shingles Vaccine  Completed    Pneumococcal 65+ years Vaccine  Completed    Hepatitis C screen  Completed    DEXA (modify frequency per FRAX score)  Addressed    Hepatitis A vaccine  Aged Out    Hib vaccine  Aged Out    Meningococcal (ACWY) vaccine  Aged Out     Recommendations for [place] Due: see orders and patient instructions/AVS.  . Recommended screening schedule for the next 5-10 years is provided to the patient in written form: see Patient Jimenez Louis was seen today for medicare awv.     Diagnoses and all orders for this visit:    Screening breast examination    Screen for colon

## 2020-10-26 NOTE — PROGRESS NOTES
10/26/2020    Cristela Tracey (:  1945) is a 76 y.o. female, here to establish care or for evaluation of the following medical concerns:    HPI    Review of Systems    Current Outpatient Medications   Medication Sig Dispense Refill    blood glucose test strips (ASCENSIA AUTODISC VI;ONE TOUCH ULTRA TEST VI) strip       albuterol sulfate  (90 Base) MCG/ACT inhaler       simvastatin (ZOCOR) 20 MG tablet TAKE ONE TABLET BY MOUTH DAILY 90 tablet 3    glipiZIDE (GLUCOTROL) 5 MG tablet Take 2 tablets by mouth daily 180 tablet 3    metFORMIN (GLUCOPHAGE) 500 MG tablet TAKE TWO TABLETS BY MOUTH EVERY MORNING 180 tablet 2    ONE TOUCH ULTRA TEST strip USE 1 STRIP TO TEST TWO TIMES A  strip 11    amLODIPine (NORVASC) 10 MG tablet TAKE ONE TABLET BY MOUTH DAILY 90 tablet 3    latanoprost (XALATAN) 0.005 % ophthalmic solution        No current facility-administered medications for this visit.         Allergies   Allergen Reactions    Aspirin Other (See Comments)     Ulcers    Lipitor      CPK       Past Medical History:   Diagnosis Date    Diverticulosis     Duodenal ulcer     Fatty liver     Gastric polyp     Hyperlipidemia     Hyperplastic colon polyp     NIDDM (non-insulin dependent diabetes mellitus)     Osteopenia 2010 - DEXA    Lumbar T score 0.2 and Hip T score 0.1    Osteopenia DEXA - Aug., 2013    Lumbar T score -0.1 and Hip T score -0/2    Other screening mammogram 2010    Negative    Other screening mammogram 2012    [facility]    Other screening mammogram May 2, 2013    Benign    Other screening mammogram 2015    Negative     Screening mammogram, encounter for *2016    Negative    Screening mammogram, encounter for *2018    Benign    Screening mammogram, encounter for *2019    Benign    Villous adenoma        Past Surgical History:   Procedure Laterality Date    COLONOSCOPY  Aug., 2007 (  )    Dr. Kojo Larose - small hyperplastic polyp    COLONOSCOPY   (  )    Dr. Tena Viveros - diverticulosis, no polyps    COLONOSCOPY  *Aug.29, 2017 (  )    Dr. Tena Viveros - hyperplastic polyp    [address]Y, [address] SALPINGO-OOPHORECTOMY  1984    unilateral    UPPER GASTROINTESTINAL ENDOSCOPY  Aug., 2005    duodenal ulcer, gastric polyp       Social History     Socioeconomic History    Marital status:      Spouse name: Luis Jiang Number of children: 3    Years of education: Not on file    Highest education level: Not on file   Occupational History    Occupation: [employer]   Social Needs    Financial resource strain: Not on file    Food insecurity     Worry: Not on file     Inability: Not on file   [employer] needs     Medical: Not on file     Non-medical: Not on file   Tobacco Use    Smoking status: Former Smoker     Packs/day: 2.00     Years: 30.00     Pack years: 60.00     Types: Cigarettes     Last attempt to quit: 1986     Years since quittin.8    Smokeless tobacco: Never Used   Substance and Sexual Activity    Alcohol use: No     Alcohol/week: 0.0 standard drinks    Drug use: No    Sexual activity: Not on file   Lifestyle    Physical activity     Days per week: Not on file     Minutes per session: Not on file    Stress: Not on file   Relationships    Social connections     Talks on phone: Not on file     Gets together: Not on file     Attends Druze service: Not on file     Active member of club or organization: Not on file     Attends meetings of clubs or organizations: Not on file     Relationship status: Not on file    Intimate partner violence     Fear of current or ex partner: Not on file     Emotionally abused: Not on file     Physically abused: Not on file     Forced sexual activity: Not on file   Other Topics Concern    Not on file   Social History Narrative    Living Will:  No.                Family History Problem Relation Age of Onset    Cancer Father 59        , colon cancer.  Cancer Mother 68        , nasopharyngeal cancer, IDDM, hypertension. Vitals:    10/26/20 1044   BP: 126/68   Pulse: 78   Temp: 97.7 °F (36.5 °C)   SpO2: 97%       Estimated body mass index is 37.45 kg/m² as calculated from the following:    Height as of 19: 5' 6\" (1.676 m). Weight as of this encounter: 232 lb (105.2 kg). Physical Exam    ASSESSMENT/PLAN:  Health Maintenance   Topic Date Due    Diabetic foot exam  2020    A1C test (Diabetic or Prediabetic)  2020    DTaP/Tdap/Td vaccine (1 - Tdap) 2020 (Originally 1964)    Lipid screen  2020    Diabetic retinal exam  2021    Annual Wellness Visit (AWV)  10/27/2021    Colon cancer screen colonoscopy  2022    Flu vaccine  Completed    Shingles Vaccine  Completed    Pneumococcal 65+ years Vaccine  Completed    Hepatitis C screen  Completed    DEXA (modify frequency per FRAX score)  Addressed    Hepatitis A vaccine  Aged Out    Hib vaccine  Aged Out    Meningococcal (ACWY) vaccine  Aged Out        Diagnosis Orders   1. Encounter for screening mammogram for malignant neoplasm of breast  CAT DIGITAL SCREEN W OR WO CAD BILATERAL   2. Type 2 diabetes mellitus with hyperglycemia, without long-term current use of insulin (HCC)  HEMOGLOBIN A1C    COMPREHENSIVE METABOLIC PANEL   3. Hyperlipidemia, unspecified hyperlipidemia type  LIPID PANEL   4. Acute pain of right hip  XR HIP BILATERAL W AP PELVIS (2 VIEWS)   5. Screen for colon cancer  POCT Fecal Immunochemical Test (FIT)       No follow-ups on file. An  electronic signature was used to authenticate this note.   --Loresean Danielle on 10/28/2020 at 12:06 PM

## 2020-11-03 LAB
ALBUMIN SERPL-MCNC: 4.7 G/DL
ALP BLD-CCNC: 90 U/L
ALT SERPL-CCNC: 23 U/L
ANION GAP SERPL CALCULATED.3IONS-SCNC: 8 MMOL/L
AST SERPL-CCNC: 17 U/L
AVERAGE GLUCOSE: 192
BILIRUB SERPL-MCNC: 0.4 MG/DL (ref 0.1–1.4)
BUN BLDV-MCNC: 10 MG/DL
CALCIUM SERPL-MCNC: 10 MG/DL
CHLORIDE BLD-SCNC: 105 MMOL/L
CHOLESTEROL, TOTAL: 159 MG/DL
CHOLESTEROL/HDL RATIO: NORMAL
CO2: 27 MMOL/L
CREAT SERPL-MCNC: 0.9 MG/DL
GFR CALCULATED: NORMAL
GLUCOSE BLD-MCNC: 129 MG/DL
HBA1C MFR BLD: 8.3 %
HDLC SERPL-MCNC: 60 MG/DL (ref 35–70)
LDL CHOLESTEROL CALCULATED: 76 MG/DL (ref 0–160)
NONHDLC SERPL-MCNC: 99 MG/DL
POTASSIUM SERPL-SCNC: 3.9 MMOL/L
SODIUM BLD-SCNC: 140 MMOL/L
TOTAL PROTEIN: 7.9
TRIGL SERPL-MCNC: 116 MG/DL
VLDLC SERPL CALC-MCNC: NORMAL MG/DL

## 2020-11-12 ENCOUNTER — PATIENT MESSAGE (OUTPATIENT)
Dept: FAMILY MEDICINE CLINIC | Age: 75
End: 2020-11-12

## 2020-11-12 ENCOUNTER — OFFICE VISIT (OUTPATIENT)
Dept: PRIMARY CARE CLINIC | Age: 75
End: 2020-11-12
Payer: MEDICARE

## 2020-11-12 PROCEDURE — 99211 OFF/OP EST MAY X REQ PHY/QHP: CPT | Performed by: NURSE PRACTITIONER

## 2020-11-12 NOTE — TELEPHONE ENCOUNTER
From: Katherine Matute Showes  To: BRAD Berg - CNP  Sent: 11/12/2020 3:42 PM EST  Subject: Visit Follow-Up Question    Since my X-ray on my hip showed no fracture or bone lost. I'm still having pain in my groin area  Can you order a CTScan of my abdominal and pelvic area

## 2020-11-12 NOTE — PROGRESS NOTES
Edison Goodwin received a viral test for COVID-19. They were educated on isolation and quarantine as appropriate. For any symptoms, they were directed to seek care from their PCP, given contact information to establish with a doctor, directed to an urgent care or the emergency room.

## 2020-11-14 LAB — SARS-COV-2, NAA: NOT DETECTED

## 2020-11-16 ENCOUNTER — TELEPHONE (OUTPATIENT)
Dept: FAMILY MEDICINE CLINIC | Age: 75
End: 2020-11-16

## 2020-11-16 DIAGNOSIS — Z12.11 SCREEN FOR COLON CANCER: ICD-10-CM

## 2020-11-16 LAB
CONTROL: ABNORMAL
HEMOCCULT STL QL: POSITIVE

## 2020-11-16 NOTE — TELEPHONE ENCOUNTER
Abbi called. She had an xray of her hips that showed everything was fine. Still having pain in her groin. Wondered if you could order a abdominal/pelvic CT scan   She also asked if she could give a urine sample - she didn't do that when she was here.

## 2020-11-17 RX ORDER — AMLODIPINE BESYLATE 10 MG/1
TABLET ORAL
Qty: 90 TABLET | Refills: 3 | Status: SHIPPED | OUTPATIENT
Start: 2020-11-17 | End: 2021-11-10 | Stop reason: SDUPTHER

## 2020-11-19 ENCOUNTER — PATIENT MESSAGE (OUTPATIENT)
Dept: FAMILY MEDICINE CLINIC | Age: 75
End: 2020-11-19

## 2020-11-20 ENCOUNTER — HOSPITAL ENCOUNTER (OUTPATIENT)
Dept: CT IMAGING | Age: 75
Discharge: HOME | End: 2020-11-20
Payer: MEDICARE

## 2020-11-20 PROCEDURE — 6360000004 HC RX CONTRAST MEDICATION: Performed by: NURSE PRACTITIONER

## 2020-11-20 PROCEDURE — 74176 CT ABD & PELVIS W/O CONTRAST: CPT

## 2020-11-20 RX ADMIN — IOHEXOL 50 ML: 240 INJECTION, SOLUTION INTRATHECAL; INTRAVASCULAR; INTRAVENOUS; ORAL at 13:06

## 2020-11-20 NOTE — TELEPHONE ENCOUNTER
From: Tamy Tracey  To: BRAD Chatman CNP  Sent: 11/19/2020 7:33 PM EST  Subject: Prescription Question    My new medicine Januvia was added to my medications but there is no prescription for me at my pharmacy

## 2021-01-27 DIAGNOSIS — E11.65 TYPE 2 DIABETES MELLITUS WITH HYPERGLYCEMIA, WITHOUT LONG-TERM CURRENT USE OF INSULIN (HCC): Primary | ICD-10-CM

## 2021-01-27 RX ORDER — BLOOD SUGAR DIAGNOSTIC
1 STRIP MISCELLANEOUS DAILY
COMMUNITY
End: 2021-01-27 | Stop reason: SDUPTHER

## 2021-01-27 RX ORDER — BLOOD SUGAR DIAGNOSTIC
1 STRIP MISCELLANEOUS 2 TIMES DAILY
Qty: 200 EACH | Refills: 3 | Status: SHIPPED | OUTPATIENT
Start: 2021-01-27 | End: 2021-05-17

## 2021-04-08 RX ORDER — GLIPIZIDE 5 MG/1
10 TABLET ORAL DAILY
Qty: 180 TABLET | Refills: 3 | Status: SHIPPED | OUTPATIENT
Start: 2021-04-08 | End: 2022-04-08 | Stop reason: SDUPTHER

## 2021-05-17 ENCOUNTER — OFFICE VISIT (OUTPATIENT)
Dept: PRIMARY CARE CLINIC | Age: 76
End: 2021-05-17
Payer: MEDICARE

## 2021-05-17 VITALS
TEMPERATURE: 97 F | OXYGEN SATURATION: 96 % | SYSTOLIC BLOOD PRESSURE: 128 MMHG | WEIGHT: 237 LBS | HEIGHT: 66 IN | BODY MASS INDEX: 38.09 KG/M2 | HEART RATE: 70 BPM | DIASTOLIC BLOOD PRESSURE: 75 MMHG

## 2021-05-17 DIAGNOSIS — E78.2 MIXED HYPERLIPIDEMIA: ICD-10-CM

## 2021-05-17 DIAGNOSIS — R60.0 LOCALIZED EDEMA: ICD-10-CM

## 2021-05-17 DIAGNOSIS — R60.9 PERIPHERAL EDEMA: ICD-10-CM

## 2021-05-17 DIAGNOSIS — R06.02 SHORTNESS OF BREATH: ICD-10-CM

## 2021-05-17 DIAGNOSIS — Z00.00 ROUTINE ADULT HEALTH MAINTENANCE: ICD-10-CM

## 2021-05-17 DIAGNOSIS — I10 ESSENTIAL HYPERTENSION: ICD-10-CM

## 2021-05-17 DIAGNOSIS — D64.9 ANEMIA, UNSPECIFIED TYPE: ICD-10-CM

## 2021-05-17 DIAGNOSIS — E11.65 TYPE 2 DIABETES MELLITUS WITH HYPERGLYCEMIA, WITHOUT LONG-TERM CURRENT USE OF INSULIN (HCC): Primary | ICD-10-CM

## 2021-05-17 LAB
CREATININE URINE POCT: 158.7
HBA1C MFR BLD: 7.2 %
MICROALBUMIN/CREAT 24H UR: 55.8 MG/G{CREAT}
MICROALBUMIN/CREAT UR-RTO: 35.2

## 2021-05-17 PROCEDURE — 82044 UR ALBUMIN SEMIQUANTITATIVE: CPT | Performed by: INTERNAL MEDICINE

## 2021-05-17 PROCEDURE — 83036 HEMOGLOBIN GLYCOSYLATED A1C: CPT | Performed by: INTERNAL MEDICINE

## 2021-05-17 PROCEDURE — 99203 OFFICE O/P NEW LOW 30 MIN: CPT | Performed by: INTERNAL MEDICINE

## 2021-05-17 PROCEDURE — 3051F HG A1C>EQUAL 7.0%<8.0%: CPT | Performed by: INTERNAL MEDICINE

## 2021-05-17 RX ORDER — FUROSEMIDE 20 MG/1
20 TABLET ORAL DAILY
Qty: 60 TABLET | Refills: 3 | Status: SHIPPED | OUTPATIENT
Start: 2021-05-17 | End: 2022-06-22

## 2021-05-17 RX ORDER — METFORMIN HYDROCHLORIDE 1000 MG/1
1000 TABLET, FILM COATED, EXTENDED RELEASE ORAL DAILY
Qty: 90 TABLET | Refills: 1 | Status: SHIPPED | OUTPATIENT
Start: 2021-05-17 | End: 2021-09-13

## 2021-05-17 ASSESSMENT — PATIENT HEALTH QUESTIONNAIRE - PHQ9
SUM OF ALL RESPONSES TO PHQ QUESTIONS 1-9: 0
2. FEELING DOWN, DEPRESSED OR HOPELESS: 0
SUM OF ALL RESPONSES TO PHQ QUESTIONS 1-9: 0
SUM OF ALL RESPONSES TO PHQ9 QUESTIONS 1 & 2: 0
1. LITTLE INTEREST OR PLEASURE IN DOING THINGS: 0
SUM OF ALL RESPONSES TO PHQ QUESTIONS 1-9: 0

## 2021-05-17 NOTE — PATIENT INSTRUCTIONS
1. What medications like Taniya Good are better covered by your insurance?-Check it's formulary  2. Blood test in a week  3. US legs and heart in next coup.le of weeks, see me after  4. Compression hose on in AM off in PM. Legs elevated.   5. Dove or Oil of Olay soap

## 2021-05-17 NOTE — PROGRESS NOTES
2021    Juliet Mejias (:  1945) is a 68 y.o. female, here for evaluation of the following medical concerns:    Chief Complaint   Patient presents with   Wilhelminia Blazing Established New Doctor    Foot Swelling     bilateral for 2 weeks, states that elevating doesn't help    Diabetes     uncontrolled       HPI  61-year-old -American female with history of type 2 diabetes, osteopenia, fatty liver disease,  Patient had an establish care visit 2020. At that time mammogram fecal immunochemical test, CMP lipids A1c organized. Lipid control was reasonable LDL in the 70s, A1c is running 7.78.3 Hemoccult positive without recent CBC. Review of Systems   Constitutional: Negative for activity change, appetite change, fatigue and unexpected weight change. HENT: Negative for dental problem, sinus pain, sore throat and trouble swallowing. Eyes: Negative for pain and visual disturbance. Respiratory: Negative for apnea, cough, chest tightness, shortness of breath and wheezing. Cardiovascular: Negative for chest pain and palpitations. Gastrointestinal: Negative for abdominal pain, blood in stool, constipation, diarrhea, nausea, rectal pain and vomiting. Endocrine: Negative for cold intolerance, heat intolerance, polydipsia, polyphagia and polyuria. Genitourinary: Negative for difficulty urinating, dysuria, flank pain, frequency, hematuria, pelvic pain, urgency, vaginal bleeding and vaginal discharge. Musculoskeletal: Negative for arthralgias, back pain, gait problem, joint swelling, myalgias, neck pain and neck stiffness. Skin: Negative for color change and rash. Neurological: Negative for dizziness, tremors, syncope, speech difficulty, weakness, light-headedness and headaches. Hematological: Negative for adenopathy. Does not bruise/bleed easily.    Psychiatric/Behavioral: Negative for agitation, behavioral problems, decreased concentration, sleep disturbance and suicidal ideas. The patient is not nervous/anxious and is not hyperactive. Prior to Visit Medications    Medication Sig Taking?  Authorizing Provider   metFORMIN (GLUMETZA) 1000 MG extended release tablet Take 1 tablet by mouth daily With Dinner Yes Compa Kuo MD   furosemide (LASIX) 20 MG tablet Take 1 tablet by mouth daily Yes Compa Kuo MD   glipiZIDE (GLUCOTROL) 5 MG tablet Take 2 tablets by mouth daily Yes BRAD St CNP   SITagliptin (JANUVIA) 100 MG tablet Take 1 tablet by mouth daily Yes BRAD St CNP   amLODIPine (NORVASC) 10 MG tablet TAKE ONE TABLET BY MOUTH DAILY Yes BRAD St CNP   albuterol sulfate  (90 Base) MCG/ACT inhaler  Yes Historical Provider, MD   simvastatin (ZOCOR) 20 MG tablet TAKE ONE TABLET BY MOUTH DAILY Yes TREVIN Regalado MD   blood glucose test strips (ONETOUCH ULTRA) strip 1 each by In Vitro route 2 times daily Onetouch ultra blue test strip  Lelon Apley, APRN - CNP   latanoprost (XALATAN) 0.005 % ophthalmic solution   Historical Provider, MD   [address] TEST strip USE 1 STRIP TO TEST TWO TIMES A DAY  V Jumana Regalado MD        Allergies   Allergen Reactions    Aspirin Other (See Comments)     Ulcers    Lipitor      CPK       Past Medical History:   Diagnosis Date    Diverticulosis 2004    Duodenal ulcer 2005    Fatty liver     Gastric polyp 2005    Hyperlipidemia     Hyperplastic colon polyp 2004    NIDDM (non-insulin dependent diabetes mellitus)     Osteopenia Nov., 2010 - DEXA    Lumbar T score 0.2 and Hip T score 0.1    Osteopenia DEXA - Aug., 2013    Lumbar T score -0.1 and Hip T score -0/2    Other screening mammogram Nov. 1, 2010    Negative    Other screening mammogram March 14, 2012    Negative [facility] )    Other screening mammogram May 2, 2013    Benign    Other screening mammogram Nov. 2, 2015    Negative     Screening mammogram, encounter for *November 21, 2016    Negative    Screening mammogram, encounter for *2018    Benign    Screening mammogram, encounter for *2019    Benign    Villous adenoma        Past Surgical History:   Procedure Laterality Date    COLONOSCOPY  Aug., 2007 (  )    Dr. Sofía Cartagena - small hyperplastic polyp    COLONOSCOPY   (  )    Dr. Angelica Alvarado - diverticulosis, no polyps    COLONOSCOPY  *Aug.29, 2017 (  )    Dr. Angelica Alvarado - hyperplastic polyp    HYSTERECTOMY, [address] SALPINGO-OOPHORECTOMY  1984    unilateral    UPPER GASTROINTESTINAL ENDOSCOPY  Aug., 2005    duodenal ulcer, gastric polyp       Social History     Socioeconomic History    Marital status:      Spouse name: Indy Mendez Number of children: 3    Years of education: Not on file    Highest education level: Not on file   Occupational History    Occupation: hairdresser   Tobacco Use    Smoking status: Former Smoker     Packs/day: 2.00     Years: 30.00     Pack years: 60.00     Types: Cigarettes     Quit date: 1986     Years since quittin.3    Smokeless tobacco: Never Used   Vaping Use    Vaping Use: Never used   Substance and Sexual Activity    Alcohol use: No     Alcohol/week: 0.0 standard drinks    Drug use: No    Sexual activity: Not on file   Other Topics Concern    Not on file   Social History Narrative    Living Will:  No.             Social Determinants of Health     Financial Resource Strain: Low Risk     Difficulty of Paying Living Expenses: Not hard at all   Food Insecurity: No Food Insecurity    Worried About [address] in the Last Year: Never true    [facility] in the Last Year: Never true   Transportation Needs:     Lack of Transportation (Medical):      Lack of Transportation (Non-Medical):    Physical Activity:     Days of Exercise per Week:     Minutes of Exercise per Session:    Stress:     Feeling of Stress :    Social Connections:     Frequency of Communication with Friends and Family:     Frequency of Social Gatherings with Friends and Family:     Attends Jewish Services:     Active Member of Clubs or Organizations:     Attends Club or Organization Meetings:     Marital Status:    Intimate Partner Violence:     Fear of Current or Ex-Partner:     Emotionally Abused:     Physically Abused:     Sexually Abused:         Family History   Problem Relation Age of Onset    Cancer Father 59        , colon cancer.  Cancer Mother 68        , nasopharyngeal cancer, IDDM, hypertension. Vitals:    21 1433   BP: 128/75   Pulse: 70   Temp: 97 °F (36.1 °C)   TempSrc: Temporal   SpO2: 96%   Weight: 237 lb (107.5 kg)   Height: 5' 6\" (1.676 m)     Estimated body mass index is 38.25 kg/m² as calculated from the following:    Height as of this encounter: 5' 6\" (1.676 m). Weight as of this encounter: 237 lb (107.5 kg). PHYSICAL EXAM  GENERAL:  Pleasant  female who looks her stated age, awake alert and oriented x3, no acute distress. EXTREMITIES: 2-3+ ankle and foot edema, without skin breakdown, some hyperpigmentation. PSYCH:  No psychomotor retardation or agitation. Good eye contact. Unrestricted affect range. Mood congruent with affect. Linear thought.     LABS  Lab Review   Abstract on 2020   Component Date Value    Hemoglobin A1C 2020 8.3     AVERAGE GLUCOSE 2020 192     Sodium 2020 140     Chloride 2020 105     Potassium 2020 3.9     BUN 2020 10     CREATININE 2020 0.90     Glucose 2020 129     AST 2020 17     ALT 2020 23     Calcium 2020 10.0     Total Protein 2020 7.9     CO2 2020 27     Albumin 2020 4.7     Alkaline Phosphatase 2020 90     Total Bilirubin 2020 0.4     Anion Gap 2020 8     Cholesterol, Total 2020 159     HDL 2020 60     LDL Calculated 2020 76     Triglycerides 2020 116     Cholesterol non HDL 11/03/2020 99    Abstract on 11/16/2020   Component Date Value    OCCULT BLOOD FECAL 11/16/2020 POSITIVE    Office Visit on 11/12/2020   Component Date Value    SARS-CoV-2, SUGAR 11/12/2020 NOT DETECTED          ASSESSMENT/PLAN  1. Type 2 diabetes mellitus with hyperglycemia, without long-term current use of insulin (HCC)  Reasonable control, but may not be able to afford Januvia. Asked daughter to research what medications and Januvia as class might be more affordable. She may do better in terms of diarrhea with extended release Metformin, if insurance will cover prescription sent. - POCT glycosylated hemoglobin (Hb A1C)  - metFORMIN (GLUMETZA) 1000 MG extended release tablet; Take 1 tablet by mouth daily With Dinner  Dispense: 90 tablet; Refill: 1  - MICROALBUMIN / CREATININE URINE RATIO; Future    2. Peripheral edema  Amlodipine, BMI 38, and likely valvular venous insufficiency are contributory. Rule out pulmonary hypertension chronic DVT. - US DOPPLER VENOUS LEGS B/L; Future  - BRAIN NATRIURETIC PEPTIDE (BNP); Future  - DME Order for (Specify) as OP  - ECHO Complete 2D W Doppler W Color; Future  - CBC Auto Differential; Future    3. Mixed hyperlipidemia  Update labs  - Lipid Panel; Future  - AST; Future  - ALT; Future    4. Routine adult health maintenance  Update labs. Tdap due. Pneumovax 23 2013, Shingrix 2020, but Tali Paling Covid 2021. Beyond age of Pap smear, mammogram.  Discuss bone density at follow-up. Screen for vitamin D and thyroid disease.  - Vitamin D 25 Hydroxy; Future  - TSH with Reflex; Future    5. Essential hypertension  Seemingly fair control. Update BMP. 6. Anemia, unspecified type  Patient was heme positive, normal CBC MCV July 2020.  - Iron and TIBC; Future    7. Shortness of breath   Multi factorial related to age deconditioning obesity.  - BRAIN NATRIURETIC PEPTIDE (BNP); Future    8. Localized edema   Ruling out pulmonary hypertension.   If elevated pressures, consider CT chest PE eval, cardiology consultation.  - ECHO Complete 2D W Doppler W Color; Future  -Lasix 20 mg daily  -BMP in 10 days to exclude hypokalemia  -Compression hose while upright    9. Body mass index (BMI) 38.0-38.9, adult     - Vitamin D 25 Hydroxy; Future    10. history of adenomatous colon polyps. Colonoscopy December 2020 retrieved 8 polyps all adenomas, size unknown. Follow-up colonoscopy March 2021 showed no adenomas. Repeat colonoscopy due March 2024 if clinically appropriate. Return in about 3 weeks (around 6/7/2021). It was a pleasure to visit with Ms. Tracey today. Answered all questions as best I could.   Óscar Kelly MD   Time 32 minutes

## 2021-05-20 RX ORDER — SITAGLIPTIN 100 MG/1
TABLET, FILM COATED ORAL
Qty: 90 TABLET | Refills: 3 | Status: SHIPPED | OUTPATIENT
Start: 2021-05-20 | End: 2021-09-13

## 2021-05-21 ENCOUNTER — HOSPITAL ENCOUNTER (OUTPATIENT)
Dept: VASCULAR LAB | Age: 76
Discharge: HOME | End: 2021-05-21
Payer: MEDICARE

## 2021-05-21 ENCOUNTER — HOSPITAL ENCOUNTER (OUTPATIENT)
Dept: NON INVASIVE DIAGNOSTICS | Age: 76
Discharge: HOME | End: 2021-05-21
Payer: MEDICARE

## 2021-05-21 DIAGNOSIS — R60.0 LOCALIZED EDEMA: ICD-10-CM

## 2021-05-21 DIAGNOSIS — R60.9 PERIPHERAL EDEMA: ICD-10-CM

## 2021-05-21 LAB
LV EF: 58 %
LVEF MODALITY: NORMAL

## 2021-05-21 PROCEDURE — 93306 TTE W/DOPPLER COMPLETE: CPT

## 2021-05-21 PROCEDURE — 93970 EXTREMITY STUDY: CPT

## 2021-05-24 DIAGNOSIS — R06.02 SHORTNESS OF BREATH: ICD-10-CM

## 2021-05-24 DIAGNOSIS — D64.9 ANEMIA, UNSPECIFIED TYPE: ICD-10-CM

## 2021-05-24 DIAGNOSIS — R60.9 PERIPHERAL EDEMA: ICD-10-CM

## 2021-05-24 DIAGNOSIS — E78.2 MIXED HYPERLIPIDEMIA: ICD-10-CM

## 2021-05-24 DIAGNOSIS — Z00.00 ROUTINE ADULT HEALTH MAINTENANCE: ICD-10-CM

## 2021-05-24 LAB
ALT SERPL-CCNC: 18 U/L (ref 10–40)
AST SERPL-CCNC: 17 U/L (ref 15–37)
BASOPHILS ABSOLUTE: 0.1 K/UL (ref 0–0.2)
BASOPHILS RELATIVE PERCENT: 0.6 %
CHOLESTEROL, TOTAL: 143 MG/DL (ref 0–199)
EOSINOPHILS ABSOLUTE: 0.1 K/UL (ref 0–0.6)
EOSINOPHILS RELATIVE PERCENT: 1.2 %
HCT VFR BLD CALC: 37.1 % (ref 36–48)
HDLC SERPL-MCNC: 57 MG/DL (ref 40–60)
HEMOGLOBIN: 12.5 G/DL (ref 12–16)
IRON SATURATION: 19 % (ref 15–50)
IRON: 59 UG/DL (ref 37–145)
LDL CHOLESTEROL CALCULATED: 66 MG/DL
LYMPHOCYTES ABSOLUTE: 1.5 K/UL (ref 1–5.1)
LYMPHOCYTES RELATIVE PERCENT: 17.2 %
MCH RBC QN AUTO: 29.3 PG (ref 26–34)
MCHC RBC AUTO-ENTMCNC: 33.5 G/DL (ref 31–36)
MCV RBC AUTO: 87.2 FL (ref 80–100)
MONOCYTES ABSOLUTE: 0.6 K/UL (ref 0–1.3)
MONOCYTES RELATIVE PERCENT: 7.1 %
NEUTROPHILS ABSOLUTE: 6.6 K/UL (ref 1.7–7.7)
NEUTROPHILS RELATIVE PERCENT: 73.9 %
PDW BLD-RTO: 15.9 % (ref 12.4–15.4)
PLATELET # BLD: 235 K/UL (ref 135–450)
PMV BLD AUTO: 10 FL (ref 5–10.5)
PRO-BNP: 21 PG/ML (ref 0–449)
RBC # BLD: 4.26 M/UL (ref 4–5.2)
TOTAL IRON BINDING CAPACITY: 305 UG/DL (ref 260–445)
TRIGL SERPL-MCNC: 101 MG/DL (ref 0–150)
TSH REFLEX: 3.39 UIU/ML (ref 0.27–4.2)
VITAMIN D 25-HYDROXY: 13.7 NG/ML
VLDLC SERPL CALC-MCNC: 20 MG/DL
WBC # BLD: 8.9 K/UL (ref 4–11)

## 2021-06-07 ENCOUNTER — OFFICE VISIT (OUTPATIENT)
Dept: PRIMARY CARE CLINIC | Age: 76
End: 2021-06-07
Payer: MEDICARE

## 2021-06-07 VITALS
OXYGEN SATURATION: 95 % | SYSTOLIC BLOOD PRESSURE: 124 MMHG | HEIGHT: 66 IN | TEMPERATURE: 98.2 F | DIASTOLIC BLOOD PRESSURE: 70 MMHG | WEIGHT: 236 LBS | BODY MASS INDEX: 37.93 KG/M2 | HEART RATE: 75 BPM

## 2021-06-07 DIAGNOSIS — R60.0 LEG EDEMA: ICD-10-CM

## 2021-06-07 DIAGNOSIS — R06.09 DYSPNEA ON EXERTION: Primary | ICD-10-CM

## 2021-06-07 PROCEDURE — 99214 OFFICE O/P EST MOD 30 MIN: CPT | Performed by: INTERNAL MEDICINE

## 2021-06-07 NOTE — PATIENT INSTRUCTIONS
Please record 5 pre dinner blood sugars over the next 3 weeks. Get to walking, and making less calorically dense nighttime snacks choices.   Tdap at your pharmacy  If SoB worsens despite lowish sodium diet and regular walks, Would recommend chemical stress test (adenosine sestaimibi)

## 2021-06-08 RX ORDER — SIMVASTATIN 20 MG
TABLET ORAL
Qty: 90 TABLET | Refills: 3 | Status: SHIPPED | OUTPATIENT
Start: 2021-06-08 | End: 2022-06-07

## 2021-06-09 ENCOUNTER — TELEPHONE (OUTPATIENT)
Dept: PRIMARY CARE CLINIC | Age: 76
End: 2021-06-09

## 2021-06-09 RX ORDER — ERGOCALCIFEROL 1.25 MG/1
50000 CAPSULE ORAL WEEKLY
Qty: 12 CAPSULE | Refills: 1 | Status: SHIPPED | OUTPATIENT
Start: 2021-06-09 | End: 2022-06-22

## 2021-06-09 NOTE — TELEPHONE ENCOUNTER
Suma Winkler MD  Francekaron Jennings MA  Vitamin D levels are little low at 14, goal 30-50 or you currently taking any supplementation     Thyroid is functioning properly.  Heart failure blood test came back normal.  Iron levels are normal.  Liver blood work also looks good. Stable hemoglobin no anemia normal white blood cell count  please send over a prescription for vitamin D to her pharmacy.

## 2021-06-10 ENCOUNTER — TELEPHONE (OUTPATIENT)
Dept: PRIMARY CARE CLINIC | Age: 76
End: 2021-06-10

## 2021-09-13 ENCOUNTER — OFFICE VISIT (OUTPATIENT)
Dept: PRIMARY CARE CLINIC | Age: 76
End: 2021-09-13
Payer: MEDICARE

## 2021-09-13 VITALS
DIASTOLIC BLOOD PRESSURE: 60 MMHG | BODY MASS INDEX: 37.16 KG/M2 | HEART RATE: 72 BPM | WEIGHT: 230.2 LBS | SYSTOLIC BLOOD PRESSURE: 120 MMHG

## 2021-09-13 DIAGNOSIS — E78.2 MIXED HYPERLIPIDEMIA: ICD-10-CM

## 2021-09-13 DIAGNOSIS — Z00.00 ROUTINE ADULT HEALTH MAINTENANCE: ICD-10-CM

## 2021-09-13 DIAGNOSIS — E11.65 TYPE 2 DIABETES MELLITUS WITH HYPERGLYCEMIA, WITHOUT LONG-TERM CURRENT USE OF INSULIN (HCC): Primary | ICD-10-CM

## 2021-09-13 DIAGNOSIS — E11.65 TYPE 2 DIABETES MELLITUS WITH HYPERGLYCEMIA, WITHOUT LONG-TERM CURRENT USE OF INSULIN (HCC): ICD-10-CM

## 2021-09-13 DIAGNOSIS — I10 ESSENTIAL HYPERTENSION: ICD-10-CM

## 2021-09-13 LAB — HBA1C MFR BLD: 7 %

## 2021-09-13 PROCEDURE — 83036 HEMOGLOBIN GLYCOSYLATED A1C: CPT | Performed by: INTERNAL MEDICINE

## 2021-09-13 PROCEDURE — 3051F HG A1C>EQUAL 7.0%<8.0%: CPT | Performed by: INTERNAL MEDICINE

## 2021-09-13 PROCEDURE — 99213 OFFICE O/P EST LOW 20 MIN: CPT | Performed by: INTERNAL MEDICINE

## 2021-09-13 NOTE — PATIENT INSTRUCTIONS
1. SGLT2 inhibitor: Empaglifozin Canaglifozin Dapaglifozin VERSUS  GLP-1 agonist dulaglutide semaglutide liraglutide   Let me know which is cheapest    2. Metamucil or Citrucell with water daily    3.  Fasting blood work in Nov 2021

## 2021-09-13 NOTE — PROGRESS NOTES
2021    Marcy Tracey (:  1945) is a 68 y.o. female, here for evaluation of the following medical concerns:    No chief complaint on file. HPI  77-year-old -American female with history of type 2 diabetes, osteopenia, fatty liver disease, who comes in for result visit for routine follow-up. She has no acute concerns daily fecal urgency. .    Review of Systems   Constitutional: Negative for activity change, appetite change, fatigue and unexpected weight change. HENT: Negative for dental problem, sinus pain, sore throat and trouble swallowing. Eyes: Negative for pain and visual disturbance. Respiratory: Negative for apnea, cough, chest tightness, shortness of breath and wheezing. Cardiovascular: Negative for chest pain and palpitations. Gastrointestinal: Negative for abdominal pain, blood in stool, constipation, diarrhea, nausea, rectal pain and vomiting. Endocrine: Negative for cold intolerance, heat intolerance, polydipsia, polyphagia and polyuria. Genitourinary: Negative for difficulty urinating, dysuria, flank pain, frequency, hematuria, pelvic pain, urgency, vaginal bleeding and vaginal discharge. Musculoskeletal: Negative for arthralgias, back pain, gait problem, joint swelling, myalgias, neck pain and neck stiffness. Skin: Negative for color change and rash. Neurological: Negative for dizziness, tremors, syncope, speech difficulty, weakness, light-headedness and headaches. Hematological: Negative for adenopathy. Does not bruise/bleed easily. Psychiatric/Behavioral: Negative for agitation, behavioral problems, decreased concentration, sleep disturbance and suicidal ideas. The patient is not nervous/anxious and is not hyperactive. Prior to Visit Medications    Medication Sig Taking?  Authorizing Provider   vitamin D (ERGOCALCIFEROL) 1.25 MG (61509 UT) CAPS capsule Take 1 capsule by mouth once a week  Jennie Leo MD   simvastatin (ZOCOR) 20 MG tablet TAKE ONE TABLET BY MOUTH DAILY  Bonny Suazo MD   JANUVIA 100 MG tablet TAKE ONE TABLET BY MOUTH DAILY  Bonny Suazo MD   metFORMIN (GLUMETZA) 1000 MG extended release tablet Take 1 tablet by mouth daily With Erendira Corey MD   furosemide (LASIX) 20 MG tablet Take 1 tablet by mouth daily  Bonny Suazo MD   glipiZIDE (GLUCOTROL) 5 MG tablet Take 2 tablets by mouth daily  BRAD Franklin - CNP   amLODIPine (NORVASC) 10 MG tablet TAKE ONE TABLET BY MOUTH DAILY  BRAD Franklin - CNP   albuterol sulfate  (90 Base) MCG/ACT inhaler   Historical Provider, MD        Allergies   Allergen Reactions    Lisinopril Swelling    Aspirin Other (See Comments)     Ulcers    Lipitor      CPK       Past Medical History:   Diagnosis Date    Diverticulosis 2004    Duodenal ulcer 2005    Fatty liver     Gastric polyp 2005    Hyperlipidemia     Hyperplastic colon polyp 2004    NIDDM (non-insulin dependent diabetes mellitus)     Osteopenia Nov., 2010 - DEXA    Lumbar T score 0.2 and Hip T score 0.1    Osteopenia DEXA - Aug., 2013    Lumbar T score -0.1 and Hip T score -0/2    Other screening mammogram Nov. 1, 2010    Negative    Other screening mammogram March 14, 2012    Negative [facility] )    Other screening mammogram May 2, 2013    Benign    Other screening mammogram Nov. 2, 2015    Negative     Screening mammogram, encounter for *November 21, 2016    Negative    Screening mammogram, encounter for *February 21, 2018    Benign    Screening mammogram, encounter for *April 29, 2019    Benign    Villous adenoma 1999       Past Surgical History:   Procedure Laterality Date    COLONOSCOPY  Aug., 2007 ( 2012 )    Dr. Nereida Wilson - small hyperplastic polyp    COLONOSCOPY  April, 2012 ( 2017 )    Dr. Yocasta Celaya - diverticulosis, no polyps    COLONOSCOPY  *Aug.29, 2017 ( 2022 )    Dr. Yocasta Celaya - hyperplastic polyp    HYSTERECTOMY, [address] [facility] SALPINGO-OOPHORECTOMY  1984 unilateral    UPPER GASTROINTESTINAL ENDOSCOPY  Aug., 2005    duodenal ulcer, gastric polyp       Social History     Socioeconomic History    Marital status:      Spouse name: David Busby Number of children: 3    Years of education: Not on file    Highest education level: Not on file   Occupational History    Occupation: hairdresser   Tobacco Use    Smoking status: Former Smoker     Packs/day: 2.00     Years: 30.00     Pack years: 60.00     Types: Cigarettes     Quit date: 1986     Years since quittin.7    Smokeless tobacco: Never Used   Vaping Use    Vaping Use: Never used   Substance and Sexual Activity    Alcohol use: No     Alcohol/week: 0.0 standard drinks    Drug use: No    Sexual activity: Not on file   Other Topics Concern    Not on file   Social History Narrative    Living Will:  No.             Social Determinants of Health     Financial Resource Strain: Low Risk     Difficulty of Paying Living Expenses: Not hard at all   Food Insecurity: No Food Insecurity    Worried About Running Out of Food in the Last Year: Never true    Dylan of Food in the Last Year: Never true   Transportation Needs:     Lack of Transportation (Medical):  Lack of Transportation (Non-Medical):    Physical Activity:     Days of Exercise per Week:     Minutes of Exercise per Session:    Stress:     Feeling of Stress :    Social Connections:     Frequency of Communication with Friends and Family:     Frequency of Social Gatherings with Friends and Family:     Attends Sikhism Services:     Active Member of Clubs or Organizations:     Attends Club or Organization Meetings:     Marital Status:    Intimate Partner Violence:     Fear of Current or Ex-Partner:     Emotionally Abused:     Physically Abused:     Sexually Abused:         Family History   Problem Relation Age of Onset    Cancer Father 59        , colon cancer.     Cancer Mother 68        , nasopharyngeal cancer, IDDM, hypertension. There were no vitals filed for this visit. Estimated body mass index is 38.09 kg/m² as calculated from the following:    Height as of 6/7/21: 5' 6\" (1.676 m). Weight as of 6/7/21: 236 lb (107 kg). PHYSICAL EXAM  GENERAL:  Pleasant  female who looks her stated age, awake alert and oriented x3, no acute distress. EXTREMITIES: 2-3+ ankle and foot edema, without skin breakdown, some hyperpigmentation. Doesn't go above the knees. PSYCH:  No psychomotor retardation or agitation. Good eye contact. Unrestricted affect range. Mood congruent with affect. Linear thought.     LABS  Lab Review   Orders Only on 05/24/2021   Component Date Value    TSH 05/24/2021 3.39     Vit D, 25-Hydroxy 05/24/2021 13.7*    Iron 05/24/2021 59     TIBC 05/24/2021 305     Iron Saturation 05/24/2021 19     ALT 05/24/2021 18     AST 05/24/2021 17     Cholesterol, Total 05/24/2021 143     Triglycerides 05/24/2021 101     HDL 05/24/2021 57     LDL Calculated 05/24/2021 66     VLDL Cholesterol Calcula* 05/24/2021 20     WBC 05/24/2021 8.9     RBC 05/24/2021 4.26     Hemoglobin 05/24/2021 12.5     Hematocrit 05/24/2021 37.1     MCV 05/24/2021 87.2     MCH 05/24/2021 29.3     MCHC 05/24/2021 33.5     RDW 05/24/2021 15.9*    Platelets 56/71/7697 235     MPV 05/24/2021 10.0     Neutrophils % 05/24/2021 73.9     Lymphocytes % 05/24/2021 17.2     Monocytes % 05/24/2021 7.1     Eosinophils % 05/24/2021 1.2     Basophils % 05/24/2021 0.6     Neutrophils Absolute 05/24/2021 6.6     Lymphocytes Absolute 05/24/2021 1.5     Monocytes Absolute 05/24/2021 0.6     Eosinophils Absolute 05/24/2021 0.1     Basophils Absolute 05/24/2021 0.1     Pro-BNP 05/24/2021 21    Hospital Outpatient Visit on 05/21/2021   Component Date Value    Left Ventricular Ejectio* 05/21/2021 58     LVEF MODALITY 05/21/2021 ECHO    Office Visit on 05/17/2021   Component Date Value    Hemoglobin A1C 05/17/2021 7.2*    Microalb, Ur 05/17/2021 55.8     Creatinine Ur POCT 05/17/2021 158.7     Microalbumin Creatinine * 05/17/2021 35.2    Abstract on 11/17/2020   Component Date Value    Hemoglobin A1C 11/03/2020 8.3     AVERAGE GLUCOSE 11/03/2020 192     Sodium 11/03/2020 140     Chloride 11/03/2020 105     Potassium 11/03/2020 3.9     BUN 11/03/2020 10     CREATININE 11/03/2020 0.90     Glucose 11/03/2020 129     AST 11/03/2020 17     ALT 11/03/2020 23     Calcium 11/03/2020 10.0     Total Protein 11/03/2020 7.9     CO2 11/03/2020 27     Albumin 11/03/2020 4.7     Alkaline Phosphatase 11/03/2020 90     Total Bilirubin 11/03/2020 0.4     Anion Gap 11/03/2020 8     Cholesterol, Total 11/03/2020 159     HDL 11/03/2020 60     LDL Calculated 11/03/2020 76     Triglycerides 11/03/2020 116     Cholesterol non HDL 11/03/2020 99    Abstract on 11/16/2020   Component Date Value    OCCULT BLOOD FECAL 11/16/2020 POSITIVE    Office Visit on 11/12/2020   Component Date Value    SARS-CoV-2, SUGAR 11/12/2020 NOT DETECTED          ASSESSMENT/PLAN  1. Type 2 diabetes mellitus with hyperglycemia, without long-term current use of insulin (HCC)  Reasonable control, A1c 7.0%  Tolerating IR metformin, januvia, 10mg glipizide. AM glc 160. Recommended finding out what GLP-1 drugs might be on formulary, as Januvia now cost prohibitive    2. Peripheral edema. Chronic dyspea on exertion. Amlodipine, BMI 38, and likely valvular venous insufficiency are contributory. Work up for pulmonary hypertension chronic DVT negative for DVT but showed elevated PASP 45 mm/ mm mild TR, systolic dysfunction and concentric LVH also mild. Wonder about SCOTTIE, pt declines eval.     Echo showed conc LVH and 2/4 DD in part due to LVH, but can't rule out ischemia. Unfortunately, angioedema with ACEi. Declines ad sestamibi, but will let me know if Ngo worsens. PM amlodipine, compression hose, low dose lasix, good BP control. Will embark on walking program, recheck weight in 3 months. CTA chest 2020 for pulmonary embolism though mild diffuse groundglass opacification noted also contributory. Reassuring hemoglobin TSH BNP. 3. Mixed hyperlipidemia  Excellent lipid profile normal LFTs recheck May 2022. 4. Routine adult health maintenance. Mild vitamin D deficiency. Vitamin D 14 goal 30-50. Not interested. in bone density . Tdap due. Pneumovax 23 2013, Shingrix 2020,    Bryan Lust Covid 2021. Beyond age of Pap smear, mammogram.  TSH normal.    5. Essential hypertension  Seemingly fair control. Update BMP. 6. Anemia, resolved  Hemoglobin 12.5 with normal iron studies, noting patient was heme positive, normal CBC MCV July 2020. Colonoscopy August 29, 2017 in the setting of a prior history of colon polyps and family history of colon cancer removed only a diminutive rectal polyp with follow-up colonoscopy tentatively August 2022.    7. History of adenomatous colon polyps. \"Colonoscopy December 2020 retrieved 8 polyps all adenomas, size unknown. Follow-up colonoscopy March 2021 showed no adenomas. Repeat colonoscopy due March 2024 if clinically appropriate. \"    Colonoscopy August 29, 2017 in the setting of a prior history of colon polyps and family history of colon cancer removed only a diminutive rectal polyp with follow-up colonoscopy tentatively August 2022. Uncomfortable \"pulling\" sensation at anus, daily, stooling daily-qod, chuncky/loose relieved by stooling, nonbloody. Lactose intolerant. A few small old hemorrhoids on rectal exam, she will trial fiber supplementation. No follow-ups on file. It was a pleasure to visit with Ms. Tracey today. Answered all questions as best I could.   Luis Alfredo Hill MD   Time 28 minutes

## 2021-11-10 RX ORDER — AMLODIPINE BESYLATE 10 MG/1
TABLET ORAL
Qty: 90 TABLET | Refills: 3 | OUTPATIENT
Start: 2021-11-10

## 2021-11-10 RX ORDER — AMLODIPINE BESYLATE 10 MG/1
TABLET ORAL
Qty: 90 TABLET | Refills: 3 | Status: SHIPPED | OUTPATIENT
Start: 2021-11-10

## 2021-12-13 ENCOUNTER — OFFICE VISIT (OUTPATIENT)
Dept: PRIMARY CARE CLINIC | Age: 76
End: 2021-12-13
Payer: MEDICARE

## 2021-12-13 VITALS
WEIGHT: 230 LBS | HEIGHT: 66 IN | RESPIRATION RATE: 16 BRPM | BODY MASS INDEX: 36.96 KG/M2 | SYSTOLIC BLOOD PRESSURE: 129 MMHG | OXYGEN SATURATION: 97 % | HEART RATE: 81 BPM | DIASTOLIC BLOOD PRESSURE: 71 MMHG | TEMPERATURE: 97.5 F

## 2021-12-13 VITALS
OXYGEN SATURATION: 97 % | HEIGHT: 66 IN | DIASTOLIC BLOOD PRESSURE: 71 MMHG | SYSTOLIC BLOOD PRESSURE: 129 MMHG | WEIGHT: 230 LBS | TEMPERATURE: 97.5 F | HEART RATE: 81 BPM | RESPIRATION RATE: 16 BRPM | BODY MASS INDEX: 36.96 KG/M2

## 2021-12-13 DIAGNOSIS — I27.20 PULMONARY HTN (HCC): ICD-10-CM

## 2021-12-13 DIAGNOSIS — E78.2 MIXED HYPERLIPIDEMIA: ICD-10-CM

## 2021-12-13 DIAGNOSIS — Z00.00 ROUTINE ADULT HEALTH MAINTENANCE: ICD-10-CM

## 2021-12-13 DIAGNOSIS — I10 ESSENTIAL HYPERTENSION: ICD-10-CM

## 2021-12-13 DIAGNOSIS — E11.65 TYPE 2 DIABETES MELLITUS WITH HYPERGLYCEMIA, WITHOUT LONG-TERM CURRENT USE OF INSULIN (HCC): ICD-10-CM

## 2021-12-13 DIAGNOSIS — E11.65 TYPE 2 DIABETES MELLITUS WITH HYPERGLYCEMIA, WITHOUT LONG-TERM CURRENT USE OF INSULIN (HCC): Primary | ICD-10-CM

## 2021-12-13 DIAGNOSIS — Z00.00 ROUTINE GENERAL MEDICAL EXAMINATION AT A HEALTH CARE FACILITY: Primary | ICD-10-CM

## 2021-12-13 DIAGNOSIS — R60.9 PERIPHERAL EDEMA: ICD-10-CM

## 2021-12-13 DIAGNOSIS — K76.0 FATTY LIVER: ICD-10-CM

## 2021-12-13 DIAGNOSIS — I50.32 CHRONIC HEART FAILURE WITH PRESERVED EJECTION FRACTION (HCC): ICD-10-CM

## 2021-12-13 LAB
ALT SERPL-CCNC: 26 U/L (ref 10–40)
ANION GAP SERPL CALCULATED.3IONS-SCNC: 15 MMOL/L (ref 3–16)
AST SERPL-CCNC: 22 U/L (ref 15–37)
BUN BLDV-MCNC: 10 MG/DL (ref 7–20)
CALCIUM SERPL-MCNC: 9.8 MG/DL (ref 8.3–10.6)
CHLORIDE BLD-SCNC: 103 MMOL/L (ref 99–110)
CHOLESTEROL, TOTAL: 159 MG/DL (ref 0–199)
CO2: 22 MMOL/L (ref 21–32)
CREAT SERPL-MCNC: 0.8 MG/DL (ref 0.6–1.2)
CREATININE URINE: 113.5 MG/DL (ref 28–259)
GFR AFRICAN AMERICAN: >60
GFR NON-AFRICAN AMERICAN: >60
GLUCOSE BLD-MCNC: 163 MG/DL (ref 70–99)
HDLC SERPL-MCNC: 56 MG/DL (ref 40–60)
LDL CHOLESTEROL CALCULATED: 83 MG/DL
MICROALBUMIN UR-MCNC: 2.2 MG/DL
MICROALBUMIN/CREAT UR-RTO: 19.4 MG/G (ref 0–30)
POTASSIUM SERPL-SCNC: 4.5 MMOL/L (ref 3.5–5.1)
SODIUM BLD-SCNC: 140 MMOL/L (ref 136–145)
T4 FREE: 1 NG/DL (ref 0.9–1.8)
TRIGL SERPL-MCNC: 102 MG/DL (ref 0–150)
TSH REFLEX: 6.73 UIU/ML (ref 0.27–4.2)
VITAMIN D 25-HYDROXY: 46 NG/ML
VLDLC SERPL CALC-MCNC: 20 MG/DL

## 2021-12-13 PROCEDURE — 3051F HG A1C>EQUAL 7.0%<8.0%: CPT | Performed by: INTERNAL MEDICINE

## 2021-12-13 PROCEDURE — G0439 PPPS, SUBSEQ VISIT: HCPCS | Performed by: NURSE PRACTITIONER

## 2021-12-13 PROCEDURE — 99213 OFFICE O/P EST LOW 20 MIN: CPT | Performed by: INTERNAL MEDICINE

## 2021-12-13 RX ORDER — BLOOD SUGAR DIAGNOSTIC
STRIP MISCELLANEOUS
COMMUNITY
Start: 2021-11-10 | End: 2022-02-15

## 2021-12-13 ASSESSMENT — PATIENT HEALTH QUESTIONNAIRE - PHQ9
SUM OF ALL RESPONSES TO PHQ QUESTIONS 1-9: 2
SUM OF ALL RESPONSES TO PHQ9 QUESTIONS 1 & 2: 2
2. FEELING DOWN, DEPRESSED OR HOPELESS: 1
SUM OF ALL RESPONSES TO PHQ QUESTIONS 1-9: 2
1. LITTLE INTEREST OR PLEASURE IN DOING THINGS: 1
SUM OF ALL RESPONSES TO PHQ QUESTIONS 1-9: 2

## 2021-12-13 ASSESSMENT — LIFESTYLE VARIABLES: HOW OFTEN DO YOU HAVE A DRINK CONTAINING ALCOHOL: 0

## 2021-12-13 NOTE — PROGRESS NOTES
2021    Frieda Tracey (:  1945) is a 68 y.o. female, here for evaluation of the following medical concerns:    Chief Complaint   Patient presents with    Hypertension    Diabetes       HPI  77-year-old -American female with history of type 2 diabetes, osteopenia, essential hypertension, fatty liver disease, who comes in for result visit for routine follow-up. She has no acute concerns. Earlier this year for leg swelling underwent echo of EF 55 to 60% with grade 2 out of 4 diastolic dysfunction mild TR with RVSP 45 mm. Lower extremity ultrasound negative for DVT. Chest CT negative for PE though diffuse mild interstitial infiltrates possibly due to pulmonary edema noted and CT abdomen pelvis last year showed fatty liver, spine facet osteoarthritis, bilateral fat-containing inguinal canal hernias, circumferential calcification of the aorta but no pelvic masses. She just had blood and urine test today, results not available. She states that her sugars are \"all over the place\" and did not look into formulary replacements for Januvia as requested as they have been transitioning her demented  to long-term care. Review of Systems   Constitutional: Negative for activity change, appetite change, fatigue and unexpected weight change. HENT: Negative for dental problem, sinus pain, sore throat and trouble swallowing. Eyes: Negative for pain and visual disturbance. Respiratory: Negative for apnea, cough, chest tightness, shortness of breath and wheezing. Cardiovascular: Negative for chest pain and palpitations. Gastrointestinal: Negative for abdominal pain, blood in stool, constipation, diarrhea, nausea, rectal pain and vomiting. Endocrine: Negative for cold intolerance, heat intolerance, polydipsia, polyphagia and polyuria.    Genitourinary: Negative for difficulty urinating, dysuria, flank pain, frequency, hematuria, pelvic pain, urgency, vaginal bleeding and vaginal discharge. Musculoskeletal: Negative for arthralgias, back pain, gait problem, joint swelling, myalgias, neck pain and neck stiffness. Skin: Negative for color change and rash. Neurological: Negative for dizziness, tremors, syncope, speech difficulty, weakness, light-headedness and headaches. Hematological: Negative for adenopathy. Does not bruise/bleed easily. Psychiatric/Behavioral: Negative for agitation, behavioral problems, decreased concentration, sleep disturbance and suicidal ideas. The patient is not nervous/anxious and is not hyperactive. Prior to Visit Medications    Medication Sig Taking?  Authorizing Provider   amLODIPine (NORVASC) 10 MG tablet TAKE ONE TABLET BY MOUTH DAILY Yes Sarah Aguilar MD   metFORMIN (GLUCOPHAGE) 500 MG tablet Take 2 tablets by mouth daily (with breakfast) Yes Sarah Aguilar MD   vitamin D (ERGOCALCIFEROL) 1.25 MG (95568 UT) CAPS capsule Take 1 capsule by mouth once a week Yes Sarah Aguilar MD   simvastatin (ZOCOR) 20 MG tablet TAKE ONE TABLET BY MOUTH DAILY Yes Sarah Aguilar MD   furosemide (LASIX) 20 MG tablet Take 1 tablet by mouth daily Yes Sarah Aguilar MD   glipiZIDE (GLUCOTROL) 5 MG tablet Take 2 tablets by mouth daily Yes Sarah Givens APRN - CNP   albuterol sulfate  (90 Base) MCG/ACT inhaler  Yes Historical Provider, MD   [address] [place] [address]   Historical Provider, MD        Allergies   Allergen Reactions    Lisinopril Swelling    Aspirin Other (See Comments)     Ulcers    Lipitor      CPK       Past Medical History:   Diagnosis Date    Diverticulosis 2004    Duodenal ulcer 2005    Fatty liver     Gastric polyp 2005    Hyperlipidemia     Hyperplastic colon polyp 2004    NIDDM (non-insulin dependent diabetes mellitus)     Osteopenia Nov., 2010 - DEXA    Lumbar T score 0.2 and Hip T score 0.1    Osteopenia DEXA - Aug., 2013    Lumbar T score -0.1 and Hip T score -0/2    Other screening mammogram Nov. 1, 2010 Negative    Other screening mammogram 2012    Negative [facility] )    Other screening mammogram May 2, 2013    Benign    Other screening mammogram 2015    Negative     Screening mammogram, encounter for *2016    Negative    Screening mammogram, encounter for *2018    Benign    Screening mammogram, encounter for *2019    Benign    Villous adenoma        Past Surgical History:   Procedure Laterality Date    COLONOSCOPY  Aug., 2007 (  )    Dr. Pilar Chicas - small hyperplastic polyp    COLONOSCOPY   (  )    Dr. Elias Balderas - diverticulosis, no polyps    COLONOSCOPY  *Aug.29, 2017 (  )    Dr. Elias Balderas - hyperplastic polyp    HYSTERECTOMY, [address] SALPINGO-OOPHORECTOMY  1984    unilateral    UPPER GASTROINTESTINAL ENDOSCOPY  Aug., 2005    duodenal ulcer, gastric polyp       Social History     Socioeconomic History    Marital status:      Spouse name: Mari Still Number of children: 3    Years of education: Not on file    Highest education level: Not on file   Occupational History    Occupation: hairdresser   Tobacco Use    Smoking status: Former Smoker     Packs/day: 2.00     Years: 30.00     Pack years: 60.00     Types: Cigarettes     Quit date: 1986     Years since quittin.9    Smokeless tobacco: Never Used   Vaping Use    Vaping Use: Never used   Substance and Sexual Activity    Alcohol use: No     Alcohol/week: 0.0 standard drinks    Drug use: No    Sexual activity: Not on file   Other Topics Concern    Not on file   Social History Narrative    Living Will:  No.             Social Determinants of Health     Financial Resource Strain: Low Risk     Difficulty of Paying Living Expenses: Not hard at all   Food Insecurity: No Food Insecurity    Worried About [facility] in the Last Year: Never true    Dylan of Food in the Last Year: Never true   Transportation Needs:     Lack of Transportation (Medical): Not on file    Lack of Transportation (Non-Medical): Not on file   Physical Activity:     Days of Exercise per Week: Not on file    Minutes of Exercise per Session: Not on file   Stress:     Feeling of Stress : Not on file   Social Connections:     Frequency of Communication with Friends and Family: Not on file    Frequency of Social Gatherings with Friends and Family: Not on file    Attends Cheondoism Services: Not on file    Active Member of [address] [place] [address] [employer] or Organizations: Not on file    Attends Club or Organization Meetings: Not on file    Marital Status: Not on file   Intimate Partner Violence:     Fear of Current or Ex-Partner: Not on file    Emotionally Abused: Not on file    Physically Abused: Not on file    Sexually Abused: Not on file   Housing Stability:     Unable to Pay for Housing in the Last Year: Not on file    Number of [place] in the Last Year: Not on file    Unstable Housing in the Last Year: Not on file        Family History   Problem Relation Age of Onset    Cancer Father 59        , colon cancer.  Cancer Mother 68        , nasopharyngeal cancer, IDDM, hypertension. Vitals:    21 1135   BP: 129/71   Site: Left Upper Arm   Position: Sitting   Cuff Size: Large Adult   Pulse: 81   Resp: 16   Temp: 97.5 °F (36.4 °C)   TempSrc: Temporal   SpO2: 97%   Weight: 230 lb (104.3 kg)   Height: 5' 5.5\" (1.664 m)     Estimated body mass index is 37.69 kg/m² as calculated from the following:    Height as of this encounter: 5' 5.5\" (1.664 m). Weight as of this encounter: 230 lb (104.3 kg). PHYSICAL EXAM  GENERAL:  Pleasant  female who looks her stated age, awake alert and oriented x3, no acute distress. Lungs: Fairly good air entry no crackles or wheeze. EXTREMITIES: 2+ calf, ankle and foot edema, without skin breakdown, some hyperpigmentation. Doesn't go above the knees. Brisk capillary refill.   Mild stocking paresthesia to monofilament challenge 2+ pulses all 4 extremities. PSYCH:  No psychomotor retardation or agitation. Good eye contact. Unrestricted affect range. Mood congruent with affect. Linear thought.     LABS  Lab Review   Office Visit on 09/13/2021   Component Date Value    Hemoglobin A1C 09/13/2021 7.0    Orders Only on 05/24/2021   Component Date Value    TSH 05/24/2021 3.39     Vit D, 25-Hydroxy 05/24/2021 13.7*    Iron 05/24/2021 59     TIBC 05/24/2021 305     Iron Saturation 05/24/2021 19     ALT 05/24/2021 18     AST 05/24/2021 17     Cholesterol, Total 05/24/2021 143     Triglycerides 05/24/2021 101     HDL 05/24/2021 57     LDL Calculated 05/24/2021 66     VLDL Cholesterol Calcula* 05/24/2021 20     WBC 05/24/2021 8.9     RBC 05/24/2021 4.26     Hemoglobin 05/24/2021 12.5     Hematocrit 05/24/2021 37.1     MCV 05/24/2021 87.2     MCH 05/24/2021 29.3     MCHC 05/24/2021 33.5     RDW 05/24/2021 15.9*    Platelets 99/97/3173 235     MPV 05/24/2021 10.0     Neutrophils % 05/24/2021 73.9     Lymphocytes % 05/24/2021 17.2     Monocytes % 05/24/2021 7.1     Eosinophils % 05/24/2021 1.2     Basophils % 05/24/2021 0.6     Neutrophils Absolute 05/24/2021 6.6     Lymphocytes Absolute 05/24/2021 1.5     Monocytes Absolute 05/24/2021 0.6     Eosinophils Absolute 05/24/2021 0.1     Basophils Absolute 05/24/2021 0.1     Pro-BNP 05/24/2021 21    Hospital Outpatient Visit on 05/21/2021   Component Date Value    Left Ventricular Ejectio* 05/21/2021 58     LVEF MODALITY 05/21/2021 ECHO    Office Visit on 05/17/2021   Component Date Value    Hemoglobin A1C 05/17/2021 7.2*    Microalb, Ur 05/17/2021 55.8     Creatinine Ur POCT 05/17/2021 158.7     Microalbumin Creatinine * 05/17/2021 35.2    Abstract on 11/17/2020   Component Date Value    Hemoglobin A1C 11/03/2020 8.3     AVERAGE GLUCOSE 11/03/2020 192     Sodium 11/03/2020 140     Chloride 11/03/2020 105     Potassium 11/03/2020 3.9     BUN hemoglobin TSH BNP. 3. Mixed hyperlipidemia  Excellent lipid profile normal LFTs recheck May 2022. 4. Routine adult health maintenance. Mild vitamin D deficiency. Vitamin D 14 goal 30-50. Not interested in bone density . Tdap due pt declines. Pneumovax 23 2013, Shingrix 2020,    Moderna Covid 2021 plus booster #1. Beyond age of Pap smear, mammogram.  TSH normal.    5. Essential hypertension  Seemingly fair control. Update BMP. 6. Anemia, resolved  Hemoglobin 12.5 with normal iron studies, noting patient was heme positive, normal CBC MCV July 2020. Colonoscopy August 29, 2017 in the setting of a prior history of colon polyps and family history of colon cancer removed only a diminutive rectal polyp with follow-up colonoscopy tentatively August 2022.    7. History of adenomatous colon polyps. \"Colonoscopy December 2020 retrieved 8 polyps all adenomas, size unknown. Follow-up colonoscopy March 2021 showed no adenomas. Repeat colonoscopy due March 2024 if clinically appropriate. \"    Colonoscopy August 29, 2017 in the setting of a prior history of colon polyps and family history of colon cancer removed only a diminutive rectal polyp with follow-up colonoscopy tentatively August 2022. Uncomfortable \"pulling\" sensation at anus, daily, stooling daily-qod, chuncky/loose relieved by stooling, nonbloody. Lactose intolerant. A few small old hemorrhoids on rectal exam, she will trial fiber supplementation. Return in about 4 weeks (around 1/10/2022). It was a pleasure to visit with Ms. Tracey today. Answered all questions as best I could.   Kary Galeazzi, MD   Time 28 minutes

## 2021-12-13 NOTE — PATIENT INSTRUCTIONS
Advance Directives: Care Instructions  Overview  An advance directive is a legal way to state your wishes at the end of your life. It tells your family and your doctor what to do if you can't say what you want. There are two main types of advance directives. You can change them any time your wishes change. Living will. This form tells your family and your doctor your wishes about life support and other treatment. The form is also called a declaration. Medical power of . This form lets you name a person to make treatment decisions for you when you can't speak for yourself. This person is called a health care agent (health care proxy, health care surrogate). The form is also called a durable power of  for health care. If you do not have an advance directive, decisions about your medical care may be made by a family member, or by a doctor or a  who doesn't know you. It may help to think of an advance directive as a gift to the people who care for you. If you have one, they won't have to make tough decisions by themselves. Follow-up care is a key part of your treatment and safety. Be sure to make and go to all appointments, and call your doctor if you are having problems. It's also a good idea to know your test results and keep a list of the medicines you take. What should you include in an advance directive? Many states have a unique advance directive form. (It may ask you to address specific issues.) Or you might use a universal form that's approved by many states. If your form doesn't tell you what to address, it may be hard to know what to include in your advance directive. Use the questions below to help you get started. · Who do you want to make decisions about your medical care if you are not able to? · What life-support measures do you want if you have a serious illness that gets worse over time or can't be cured? · What are you most afraid of that might happen? (Maybe you're afraid of having pain, losing your independence, or being kept alive by machines.)  · Where would you prefer to die? (Your home? A hospital? A nursing home?)  · Do you want to donate your organs when you die? · Do you want certain Scientologist practices performed before you die? When should you call for help? Be sure to contact your doctor if you have any questions. Where can you learn more? Go to [email or web address] and sign in to your SEA account. Enter R264 in the [employer] box to learn more about \"Advance Directives: Care Instructions. \"     If you do not have an account, please click on the \"Sign Up Now\" link. Current as of: March 17, 2021               Content Version: 13.0  © 2006-2021 Healthwise, [employer]. Care instructions adapted under license by [facility] [place] ([facility]). If you have questions about a medical condition or this instruction, always ask your healthcare professional. [place] [address] any warranty or liability for your use of this information. Learning About Medical Power of   What is a medical power of ? A medical power of , also called a durable power of  for health care, is one type of the legal forms called advance directives. It lets you name the person you want to make treatment decisions for you if you can't speak or decide for yourself. The person you choose is called your health care agent. This person is also called a health care proxy or health care surrogate. A medical power of  may be called something else in your state. How do you choose a health care agent? Choose your health care agent carefully. This person may or may not be a family member. Talk to the person before you make your final decision. Make sure he or she is comfortable with this responsibility. It's a good idea to choose someone who:  · Is at least 25years old.   · Knows you well and understands what makes life meaningful for you. · Understands your Quaker and moral values. · Will do what you want, not what he or she wants. · Will be able to make difficult choices at a stressful time. · Will be able to refuse or stop treatment, if that is what you would want, even if you could die. · Will be firm and confident with health professionals if needed. · Will ask questions to get needed information. · Lives near you or agrees to travel to you if needed. Your family may help you make medical decisions while you can still be part of that process. But it's important to choose one person to be your health care agent in case you aren't able to make decisions for yourself. If you don't fill out the legal form and name a health care agent, the decisions your family can make may be limited. A health care agent may be called something else in your state. Who will make decisions for you if you don't have a health care agent? If you don't have a health care agent or a living will, you may not get the care you want. Decisions may be made by family members who disagree about your medical care. Or decisions may be made by a medical professional who doesn't know you well. In some cases, a  makes the decisions. When you name a health care agent, it is very clear who has the power to make health decisions for you. How do you name a health care agent? You name your health care agent on a legal form. This form is usually called a medical power of . Ask your hospital, state bar association, or office on aging where to find these forms. You must sign the form to make it legal. Some states require you to get the form notarized. This means that a person called a  watches you sign the form and then he or she signs the form. Some states also require that two or more witnesses sign the form. Be sure to tell your family members and doctors who your health care agent is.   Where can you learn more? Go to https://chpepiceweb.[email or web address] org and sign in to your Executive C[employer]ie account. Enter [id number] in the [facility] box to learn more about \"Learning About [address]. \"     If you do not have an account, please click on the \"Sign Up Now\" link. Current as of: March 17, 2021               Content Version: 13.0  © 2006-2021 Healthwise, [employer]. Care instructions adapted under license by [facility] [place] ([facility]). If you have questions about a medical condition or this instruction, always ask your healthcare professional. [address] any warranty or liability for your use of this information. Learning About Living Rachelle Barrera  What is a living will? A living will, also called a declaration, is a legal form. It tells your family and your doctor your wishes when you can't speak for yourself. It's used by the health professionals who will treat you as you near the end of your life or if you get seriously hurt or ill. If you put your wishes in writing, your loved ones and others will know what kind of care you want. They won't need to guess. This can ease your mind and be helpful to others. And you can change or cancel your living will at any time. A living will is not the same as an estate or property will. An estate will explains what you want to happen with your money and property after you die. How do you use it? A living will is used to describe the kinds of treatment or life support you want as you near the end of your life or if you get seriously hurt or ill. Keep these facts in mind about living arboleda. · Your living will is used only if you can't speak or make decisions for yourself. Most often, one or more doctors must certify that you can't speak or decide for yourself before your living will takes effect. · If you get better and can speak for yourself again, you can accept or refuse any treatment.  It doesn't matter what you said in your living will. · Some states may limit your right to refuse treatment in certain cases. For example, you may need to clearly state in your living will that you don't want artificial hydration and nutrition, such as being fed through a tube. Is a living will a legal document? A living will is a legal document. Each state has its own laws about living arboleda. And a living will may be called something else in your state. Here are some things to know about living arboleda. · You don't need an  to complete a living will. But legal advice can be helpful if your state's laws are unclear. It can also help if your health history is complicated or your family can't agree on what should be in your living will. · You can change your living will at any time. Some people find that their wishes about end-of-life care change as their health changes. If you make big changes to your living will, complete a new form. · If you move to another state, make sure that your living will is legal in the state where you now live. In most cases, doctors will respect your wishes even if you have a form from a different state. · You might use a universal form that has been approved by many states. This kind of form can sometimes be filled out and stored online. Your digital copy will then be available wherever you have a connection to the internet. The doctors and nurses who need to treat you can find it right away. · Your state may offer an online registry. This is another place where you can store your living will online. · It's a good idea to get your living will notarized. This means using a person called a  to watch two people sign, or witness, your living will. What should you know when you create a living will? Here are some questions to ask yourself as you make your living will:  · Do you know enough about life support methods that might be used?  If not, talk to your doctor so you know what might be done if you can't breathe on your own, your heart stops, or you can't swallow. · What things would you still want to be able to do after you receive life-support methods? Would you want to be able to walk? To speak? To eat on your own? To live without the help of machines? · Do you want certain Cheondoism practices performed if you become very ill? · If you have a choice, where do you want to be cared for? In your home? At a hospital or nursing home? · If you have a choice at the end of your life, where would you prefer to die? At home? In a hospital or nursing home? Somewhere else? · Would you prefer to be buried or cremated? · Do you want your organs to be donated after you die? What should you do with your living will? · Make sure that your family members and your health care agent have copies of your living will (also called a declaration). · Give your doctor a copy of your living will. Ask him or her to keep it as part of your medical record. If you have more than one doctor, make sure that each one has a copy. · Put a copy of your living will where it can be easily found. For example, some people may put a copy on their refrigerator door. If you are using a digital copy, be sure your doctor, family members, and health care agent know how to find and access it. Where can you learn more? Go to https://Telerikpepiceweb.[employer]us. org and sign in to your AppsFlyer account. Enter C441 in the [facility] box to learn more about \"Learning About Living Christine. \"     If you do not have an account, please click on the \"Sign Up Now\" link. Current as of: March 17, 2021               Content Version: 13.0  © 0667-0514 Healthwise, Incorporated. Care instructions adapted under license by [facility] [place] ([facility]).  If you have questions about a medical condition or this instruction, always ask your healthcare professional. [address] any warranty or liability for your use of this

## 2021-12-13 NOTE — PATIENT INSTRUCTIONS
1. Seems eye exam due  2. Please at your convenience see what the cost to you of the these 2 types of meds:    A. Empaglifozin(Jardiance), dapaglifozin (farxiga)    B. Ozempic Rybelsus (both semaglutide) Victoza (liraglutide) Trulicity (dulaglutide)    3.  Bring in blood sugars

## 2021-12-13 NOTE — PROGRESS NOTES
Medicare Annual Wellness Visit  Name: Radha Esparza Date: 2021   MRN: [id number] Sex: Female   Age: 68 y.o. Ethnicity: Non- / Non    : 1945 Race: Black /       Marleni Jorgensen is here for Medicare AWV    Screenings for behavioral, psychosocial and functional/safety risks, and cognitive dysfunction are all negative except as indicated below. These results, as well as other patient data from the [address] form, are documented in Flowsheets linked to this Encounter. Allergies   Allergen Reactions    Lisinopril Swelling    Aspirin Other (See Comments)     Ulcers    Lipitor      CPK       Prior to Visit Medications    Medication Sig Taking?  Authorizing Provider   [address]   Historical Provider, MD   amLODIPine (NORVASC) 10 MG tablet TAKE ONE TABLET BY MOUTH DAILY  Griselda Mcdonnell MD   metFORMIN (GLUCOPHAGE) 500 MG tablet Take 2 tablets by mouth daily (with breakfast)  Griselda Mcdonnell MD   vitamin D (ERGOCALCIFEROL) 1.25 MG (94966 UT) CAPS capsule Take 1 capsule by mouth once a week  Griselda Mcdonnell MD   simvastatin (ZOCOR) 20 MG tablet TAKE ONE TABLET BY MOUTH DAILY  Griselda Mcdonnell MD   furosemide (LASIX) 20 MG tablet Take 1 tablet by mouth daily  Griselda Mcdonnell MD   glipiZIDE (GLUCOTROL) 5 MG tablet Take 2 tablets by mouth daily  BRAD Enamorado - CNP   albuterol sulfate  (90 Base) MCG/ACT inhaler   Historical Provider, MD       Past Medical History:   Diagnosis Date    Diverticulosis 2004    Duodenal ulcer 2005    Fatty liver     Gastric polyp 2005    Hyperlipidemia     Hyperplastic colon polyp     NIDDM (non-insulin dependent diabetes mellitus)     Osteopenia 2010 - DEXA    Lumbar T score 0.2 and Hip T score 0.1    Osteopenia DEXA - Aug., 2013    Lumbar T score -0.1 and Hip T score -0/2    Other screening mammogram 2010    Negative    Other screening mammogram 2012    Negative ( [facility] )    Other screening mammogram May 2, 2013    Benign    Other screening mammogram 2015    Negative     Screening mammogram, encounter for *2016    Negative    Screening mammogram, encounter for *2018    Benign    Screening mammogram, encounter for *2019    Benign    Villous adenoma        Past Surgical History:   Procedure Laterality Date    COLONOSCOPY  Aug., 2007 (  )    Dr. Nydia Perez - small hyperplastic polyp    COLONOSCOPY   (  )    Dr. Jennifer Medeiros - diverticulosis, no polyps    COLONOSCOPY  *Aug.29, 2017 (  )    Dr. Jennifer Medeiros - hyperplastic polyp    HYSTERECTOMY, [facility] SALPINGO-OOPHORECTOMY  1984    unilateral    UPPER GASTROINTESTINAL ENDOSCOPY  Aug., 2005    duodenal ulcer, gastric polyp       Family History   Problem Relation Age of Onset    Cancer Father 59        , colon cancer.  Cancer Mother 68        , nasopharyngeal cancer, IDDM, hypertension. CareTeam (Including outside providers/suppliers regularly involved in providing care):   Patient Care Team:  Linda Meredith MD as PCP - General (Internal Medicine)  Linda Mereidth MD as PCP - [facility] Empaneled Provider    Wt Readings from Last 3 Encounters:   21 230 lb (104.3 kg)   21 230 lb (104.3 kg)   21 230 lb 3.2 oz (104.4 kg)     Vitals:    21 1149   BP: 129/71   Pulse: 81   Resp: 16   Temp: 97.5 °F (36.4 °C)   SpO2: 97%   Weight: 230 lb (104.3 kg)   Height: 5' 5.5\" (1.664 m)     Body mass index is 37.69 kg/m². Based upon direct observation of the patient, evaluation of cognition reveals recent and remote memory intact. Patient's complete Health Risk Assessment and screening values have been reviewed and are found in Flowsheets. The following problems were reviewed today and where indicated follow up appointments were made and/or referrals ordered.     Positive Risk Factor Screenings with Interventions: General Health and ACP:  General  In general, how would you say your health is?: Good  In the past 7 days, have you experienced any of the following?  New or Increased Pain, New or Increased Fatigue, Loneliness, Social Isolation, Stress or Anger?: (!) Loneliness, Stress  Do you get the social and emotional support that you need?: Yes  Do you have a Living Will?: (!) No  Advance Directives     Power of  Living Will ACP-Advance Directive ACP-Power of     Not on File Not on File Not on File Not on File      General Health Risk Interventions:  · No Living Will: Advance Care Planning addressed with patient today    Health Habits/Nutrition:  Health Habits/Nutrition  Do you exercise for at least 20 minutes 2-3 times per week?: (!) No  Have you lost any weight without trying in the past 3 months?: No  Do you eat only one meal per day?: No  Have you seen the dentist within the past year?: Yes  Body mass index: (!) 37.69  Health Habits/Nutrition Interventions:  · no concerns at this time    Hearing/Vision:  No exam data present  Hearing/Vision  Do you or your family notice any trouble with your hearing that hasn't been managed with hearing aids?: (!) Yes  Do you have difficulty driving, watching TV, or doing any of your daily activities because of your eyesight?: (!) Yes  Have you had an eye exam within the past year?: Yes  Hearing/Vision Interventions:  · no hearing or vision concerns      Personalized Preventive Plan   Current Health Maintenance Status  Immunization History   Administered Date(s) Administered    COVID-19, Valentino Flattery, Primary or Immunocompromised, PF, 100mcg/0.5mL 01/30/2021, 02/27/2021, 11/22/2021    Influenza, High Dose (Fluzone 65 yrs and older) 11/07/2016, 10/27/2017, 10/16/2018, 09/25/2019, 10/02/2020    Influenza, High-dose, Quadv, 65 yrs +, IM (Fluzone) 09/29/2020, 10/20/2021    Pneumococcal Conjugate 13-valent ([id number]) 04/20/2016    Pneumococcal Polysaccharide ([id number]) 02/11/2008, 08/20/2013    Td, unspecified formulation 11/01/2010    Zoster Live (Zostavax) 09/10/2013    Zoster Recombinant (Shingrix) 09/25/2019, 02/10/2020        Health Maintenance   Topic Date Due    DTaP/Tdap/Td vaccine (1 - Tdap) 11/02/2010    Annual Wellness Visit (AWV)  10/27/2021    Potassium monitoring  11/03/2021    Creatinine monitoring  11/03/2021    Lipid screen  05/24/2022    DEXA (modify frequency per FRAX score)  Completed    Flu vaccine  Completed    Shingles Vaccine  Completed    Pneumococcal 65+ years Vaccine  Completed    COVID-19 Vaccine  Completed    Hepatitis C screen  Completed    Hepatitis A vaccine  Aged Out    Hib vaccine  Aged Out    Meningococcal (ACWY) vaccine  Aged Out     Recommendations for [facility] Due: see orders and patient instructions/AVS.  . Recommended screening schedule for the next 5-10 years is provided to the patient in written form: see Patient Instructions/AVS.    There are no diagnoses linked to this encounter.

## 2021-12-15 LAB — FRUCTOSAMINE: 273 UMOL/L (ref 170–285)

## 2022-01-21 ENCOUNTER — OFFICE VISIT (OUTPATIENT)
Dept: PRIMARY CARE CLINIC | Age: 77
End: 2022-01-21
Payer: MEDICARE

## 2022-01-21 VITALS
SYSTOLIC BLOOD PRESSURE: 138 MMHG | HEART RATE: 79 BPM | OXYGEN SATURATION: 93 % | RESPIRATION RATE: 12 BRPM | BODY MASS INDEX: 39.32 KG/M2 | DIASTOLIC BLOOD PRESSURE: 69 MMHG | HEIGHT: 65 IN | TEMPERATURE: 97.1 F | WEIGHT: 236 LBS

## 2022-01-21 DIAGNOSIS — E03.8 SUBCLINICAL HYPOTHYROIDISM: ICD-10-CM

## 2022-01-21 DIAGNOSIS — R05.9 COUGH: Primary | ICD-10-CM

## 2022-01-21 DIAGNOSIS — E11.65 TYPE 2 DIABETES MELLITUS WITH HYPERGLYCEMIA, WITHOUT LONG-TERM CURRENT USE OF INSULIN (HCC): ICD-10-CM

## 2022-01-21 DIAGNOSIS — J18.1 LEFT UPPER LOBE CONSOLIDATION (HCC): ICD-10-CM

## 2022-01-21 PROCEDURE — 99213 OFFICE O/P EST LOW 20 MIN: CPT | Performed by: INTERNAL MEDICINE

## 2022-01-21 NOTE — PROGRESS NOTES
2022    Angie Hagen (:  1945) is a 68 y.o. female, here for evaluation of the following medical concerns:    Chief Complaint   Patient presents with    Hypertension       HPI  77-year-old -American female with history of type 2 diabetes, osteopenia, essential hypertension, fatty liver disease, who comes in for routine hypertension follow-up. She a 60-pack-year smoking history quit remotely, and in that context reports a 1 to 2-month history of increasing cough with low-grade fever without worsening wheeze or breathlessness. .    In eg swelling on amlodipine prompted echo of EF 55 to 60% with grade 2 out of 4 diastolic dysfunction mild TR with RVSP 45 mm. Lower extremity ultrasound not completed by patient, and swelling is remained stable and  (year prior) chest CT negative for PE though diffuse mild interstitial infiltrates possibly due to pulmonary edema noted and  CT abdomen pelvis fatty liver, spine facet osteoarthritis, bilateral fat-containing inguinal canal hernias, circumferential calcification of the aorta but no pelvic masses. She states that her sugars are \"all over the place\" thought has not id'd formulary replacements for Januvia as requested. Her  has finally transitioned to long-term care for his dementia. Review of Systems   Constitutional: Negative for activity change, appetite change, fatigue and unexpected weight change. HENT: Negative for dental problem, sinus pain, sore throat and trouble swallowing. Eyes: Negative for pain and visual disturbance. Respiratory: Negative for apnea, cough, chest tightness, shortness of breath and wheezing. Cardiovascular: Negative for chest pain and palpitations. Gastrointestinal: Negative for abdominal pain, blood in stool, constipation, diarrhea, nausea, rectal pain and vomiting. Endocrine: Negative for cold intolerance, heat intolerance, polydipsia, polyphagia and polyuria.    Genitourinary: Negative for difficulty urinating, dysuria, flank pain, frequency, hematuria, pelvic pain, urgency, vaginal bleeding and vaginal discharge. Musculoskeletal: Negative for arthralgias, back pain, gait problem, joint swelling, myalgias, neck pain and neck stiffness. Skin: Negative for color change and rash. Neurological: Negative for dizziness, tremors, syncope, speech difficulty, weakness, light-headedness and headaches. Hematological: Negative for adenopathy. Does not bruise/bleed easily. Psychiatric/Behavioral: Negative for agitation, behavioral problems, decreased concentration, sleep disturbance and suicidal ideas. The patient is not nervous/anxious and is not hyperactive. Prior to Visit Medications    Medication Sig Taking?  Authorizing Provider   [address] strip  Yes Historical Provider, MD   amLODIPine (NORVASC) 10 MG tablet TAKE ONE TABLET BY MOUTH DAILY Yes Juanita Rodrigues MD   metFORMIN (GLUCOPHAGE) 500 MG tablet Take 2 tablets by mouth daily (with breakfast) Yes Juanita Rodrigues MD   simvastatin (ZOCOR) 20 MG tablet TAKE ONE TABLET BY MOUTH DAILY Yes Juanita Rodrigues MD   glipiZIDE (GLUCOTROL) 5 MG tablet Take 2 tablets by mouth daily Yes BRAD Purvis - CNP   vitamin D (ERGOCALCIFEROL) 1.25 MG (99762 UT) CAPS capsule Take 1 capsule by mouth once a week  Patient not taking: Reported on 1/21/2022  Juanita Rodrigues MD   furosemide (LASIX) 20 MG tablet Take 1 tablet by mouth daily  Patient not taking: Reported on 1/21/2022  Juanita Rodrigues MD   albuterol sulfate  (90 Base) MCG/ACT inhaler   Historical Provider, MD        Allergies   Allergen Reactions    Lisinopril Swelling    Aspirin Other (See Comments)     Ulcers    Lipitor      CPK       Past Medical History:   Diagnosis Date    Diverticulosis 2004    Duodenal ulcer 2005    Fatty liver     Gastric polyp 2005    Hyperlipidemia     Hyperplastic colon polyp 2004    NIDDM (non-insulin dependent diabetes mellitus)  Osteopenia 2010 - DEXA    Lumbar T score 0.2 and Hip T score 0.1    Osteopenia DEXA - Aug., 2013    Lumbar T score -0.1 and Hip T score -0/2    Other screening mammogram 2010    Negative    Other screening mammogram 2012    Negative [facility] )    Other screening mammogram May 2, 2013    Benign    Other screening mammogram 2015    Negative     Screening mammogram, encounter for *2016    Negative    Screening mammogram, encounter for *2018    Benign    Screening mammogram, encounter for *2019    Benign    Villous adenoma        Past Surgical History:   Procedure Laterality Date    COLONOSCOPY  Aug., 2007 (  )    Dr. Lizeth Roberts - small hyperplastic polyp    COLONOSCOPY   (  )    Dr. Phoenix Lees - diverticulosis, no polyps    COLONOSCOPY  *Aug.29, 2017 (  )    Dr. Phoenix Lees - hyperplastic polyp    [address]ST[address]OMY, [address] SALPINGO-OOPHORECTOMY  1984    unilateral    UPPER GASTROINTESTINAL ENDOSCOPY  Aug., 2005    duodenal ulcer, gastric polyp       Social History     Socioeconomic History    Marital status:      Spouse name: Jeanette Wilcox Number of children: 3    Years of education: Not on file    Highest education level: Not on file   Occupational History    Occupation: hairdresser   Tobacco Use    Smoking status: Former Smoker     Packs/day: 2.00     Years: 30.00     Pack years: 60.00     Types: Cigarettes     Quit date: 1986     Years since quittin.0    Smokeless tobacco: Never Used   Vaping Use    Vaping Use: Never used   Substance and Sexual Activity    Alcohol use: No     Alcohol/week: 0.0 standard drinks    Drug use: No    Sexual activity: Not on file   Other Topics Concern    Not on file   Social History Narrative    Living Will:  No.             Social Determinants of Health     Financial Resource Strain: Low Risk     Difficulty of Paying Living Expenses: Not hard at all   Food Insecurity: No Food Insecurity    Worried About Running Out of Food in the Last Year: Never true    Ran Out of Food in the Last Year: Never true   Transportation Needs:     Lack of Transportation (Medical): Not on file    Lack of Transportation (Non-Medical): Not on file   Physical Activity:     Days of Exercise per Week: Not on file    Minutes of Exercise per Session: Not on file   Stress:     Feeling of Stress : Not on file   Social Connections:     Frequency of Communication with Friends and Family: Not on file    Frequency of Social Gatherings with Friends and Family: Not on file    Attends Latter day Services: Not on file    Active Member of [address] [place] [address] [employer] or Organizations: Not on file    Attends Club or Organization Meetings: Not on file    Marital Status: Not on file   Intimate Partner Violence:     Fear of Current or Ex-Partner: Not on file    Emotionally Abused: Not on file    Physically Abused: Not on file    Sexually Abused: Not on file   Housing Stability:     Unable to Pay for Housing in the Last Year: Not on file    Number of [place] in the Last Year: Not on file    Unstable Housing in the Last Year: Not on file        Family History   Problem Relation Age of Onset    Cancer Father 59        , colon cancer.  Cancer Mother 68        , nasopharyngeal cancer, IDDM, hypertension. Vitals:    22 1024   BP: 138/69   Site: Left Upper Arm   Position: Sitting   Cuff Size: Large Adult   Pulse: 79   Resp: 12   Temp: 97.1 °F (36.2 °C)   TempSrc: Temporal   SpO2: 93%   Weight: 236 lb (107 kg)   Height: 5' 5\" (1.651 m)     Estimated body mass index is 39.27 kg/m² as calculated from the following:    Height as of this encounter: 5' 5\" (1.651 m). Weight as of this encounter: 236 lb (107 kg). PHYSICAL EXAM  GENERAL:  Pleasant  female who looks her stated age, awake alert and oriented x3, no acute distress. HEENT: Normocephalic atraumatic.   Pupils equal reactive light accommodation. Extract muscles are intact. No sinus tenderness. Turbinates mildly erythematous, no purulent rhinorrhea. Oropharynx is clear moist without injection or exudate. Tongue and palate move normally. Panic membranes appear normal.  HEART: Regular rate and rhythm with a mitral regurgitant murmur, no S3 or S4. NECK: JVD 5 cm sitting upright, just at the level of the clavicles, no carotid bruits. Supple nontender. Carotid upstrokes. No thyromegaly. ABDOMEN: Soft nontender no guarding normal bowel sounds. No hepatosplenomegaly. No masses. Lungs: Decreased breath sounds left upper lobe. Fair air entry no crackles or wheeze. EXTREMITIES: 2+ calf, ankle and foot edema, without skin breakdown, some hyperpigmentation. Doesn't go above the knees. Brisk capillary refill. Mild stocking paresthesia to monofilament challenge, 2+ pulses all 4 extremities. Neuro: Grossly nonfocal.  PSYCH:  No psychomotor retardation or agitation. Good eye contact. Unrestricted affect range. Mood congruent with affect. Linear thought.     LABS  Lab Review   Orders Only on 12/13/2021   Component Date Value    Sodium 12/13/2021 140     Potassium 12/13/2021 4.5     Chloride 12/13/2021 103     CO2 12/13/2021 22     Anion Gap 12/13/2021 15     Glucose 12/13/2021 163*    BUN 12/13/2021 10     CREATININE 12/13/2021 0.8     GFR Non- 12/13/2021 >60     GFR  12/13/2021 >60     Calcium 12/13/2021 9.8     ALT 12/13/2021 26     AST 12/13/2021 22     Cholesterol, Total 12/13/2021 159     Triglycerides 12/13/2021 102     HDL 12/13/2021 56     LDL Calculated 12/13/2021 83     VLDL Cholesterol Calcula* 12/13/2021 20     TSH 12/13/2021 6.73*    Vit D, 25-Hydroxy 12/13/2021 46.0     Fructosamine 12/13/2021 273     Microalbumin, Random Uri* 12/13/2021 2.20*    Creatinine, Ur 12/13/2021 113.5     Microalbumin Creatinine * 12/13/2021 19.4     T4 Free 12/13/2021 1.0 Office Visit on 09/13/2021   Component Date Value    Hemoglobin A1C 09/13/2021 7.0    Orders Only on 05/24/2021   Component Date Value    TSH 05/24/2021 3.39     Vit D, 25-Hydroxy 05/24/2021 13.7*    Iron 05/24/2021 59     TIBC 05/24/2021 305     Iron Saturation 05/24/2021 19     ALT 05/24/2021 18     AST 05/24/2021 17     Cholesterol, Total 05/24/2021 143     Triglycerides 05/24/2021 101     HDL 05/24/2021 57     LDL Calculated 05/24/2021 66     VLDL Cholesterol Calcula* 05/24/2021 20     WBC 05/24/2021 8.9     RBC 05/24/2021 4.26     Hemoglobin 05/24/2021 12.5     Hematocrit 05/24/2021 37.1     MCV 05/24/2021 87.2     MCH 05/24/2021 29.3     MCHC 05/24/2021 33.5     RDW 05/24/2021 15.9*    Platelets 44/75/5088 235     MPV 05/24/2021 10.0     Neutrophils % 05/24/2021 73.9     Lymphocytes % 05/24/2021 17.2     Monocytes % 05/24/2021 7.1     Eosinophils % 05/24/2021 1.2     Basophils % 05/24/2021 0.6     Neutrophils Absolute 05/24/2021 6.6     Lymphocytes Absolute 05/24/2021 1.5     Monocytes Absolute 05/24/2021 0.6     Eosinophils Absolute 05/24/2021 0.1     Basophils Absolute 05/24/2021 0.1     Pro-BNP 05/24/2021 21    Hospital Outpatient Visit on 05/21/2021   Component Date Value    Left Ventricular Ejectio* 05/21/2021 58     LVEF MODALITY 05/21/2021 ECHO    Office Visit on 05/17/2021   Component Date Value    Hemoglobin A1C 05/17/2021 7.2*    Microalb, Ur 05/17/2021 55.8     Creatinine Ur POCT 05/17/2021 158.7     Microalbumin Creatinine * 05/17/2021 35.2          ASSESSMENT/PLAN  1. Type 2 diabetes mellitus with hyperglycemia, without long-term current use of insulin (HCC)  Fructosamine looks good comparable to 6.5% hemoglobin A1c/132 average sugar. Tolerating IR metformin,, 10mg glipizide statin. Checking daily in AM, no record brought in . Eye exam due. Wanted to make an appointment.     Recommended finding out what SGLT2 drugs might be on formulary, as Januvia now cost prohibitive, this might work better for her diastolic heart failure very lower risk of hypoglycemia    No microalbuminuria December 2021. Update A1c in 3 months. 2. Peripheral edema. Chronic dyspea on exertion. Amlodipine, BMI 38, and likely valvular venous insufficiency are contributory. Work up for pulmonary hypertension chronic DVT negative for DVT but showed elevated PASP 45 mm/ mm mild TR, systolic dysfunction and concentric LVH also mild, in this 60-pack-year smoker. She declined to pursue venous ultrasound, I suppose chronic DVT is possible. Currently not taking even baby aspirin to hemoglobin 12.5 in 2021. Wonder about SCOTTIE, pt declines eval.     Echo showed conc LVH and 2/4 DD in part due to LVH, but can't rule out ischemia. Unfortunately, angioedema with ACEi. Declines ad sestamibi, but will let me know if Ngo worsens. PM amlodipine, compression hose, low dose lasix, good BP control. Will embark on walking program, 6 pound weight gain since late last year without worsening dyspnea. CTA chest 2020 for pulmonary embolism though mild diffuse groundglass opacification noted also contributory. Reassuring hemoglobin TSH BNP. 3. Mixed hyperlipidemia  Excellent lipid profile normal LFTs recheck May 2022. 4. Routine adult health maintenance. Mild vitamin D deficiency. Vitamin D 14 goal 30-50. Not interested in bone density . Tdap due pt declines. Pneumovax 23 2013, Shingrix 2020,    Moderna Covid 2021 plus booster #1. Beyond age of Pap smear, mammogram.  TSH normal 2021.    5. Essential hypertension  Seemingly fair control. Good BMP. 6. Anemia, resolved  Hemoglobin 12.5 with normal iron studies, noting patient was heme positive, normal CBC MCV July 2020. Colonoscopy August 29, 2017 in the setting of a prior history of colon polyps and family history of colon cancer removed only a diminutive rectal polyp with follow-up colonoscopy tentatively August 2022.     7. History of adenomatous colon polyps. \"Colonoscopy December 2020 retrieved 8 polyps all adenomas, size unknown. Follow-up colonoscopy March 2021 showed no adenomas. Repeat colonoscopy due March 2024 if clinically appropriate. \"    Uncomfortable \"pulling\" sensation at anus, daily, stooling daily-qod, chuncky/loose relieved by stooling, nonbloody. Lactose intolerant. A few small old hemorrhoids on prio rectal exam, advised fiber supplementation previously. States not straining currently. 8. New cough. Abnormal lung exam.  She reports in the setting of 60-pack-year smoking history quit 1984, 1 to 2-month history of new cough, occasionally productive, low-grade temperatures to 100 degrees in the morning prompting her to take Tylenol. Decreased breath sounds left upper lobe exam today. Upper respiratory tract exam not highly suggestive. We will start with chest x-ray now, may need chest CT. No follow-ups on file. It was a pleasure to visit with Ms. Tracey today. Answered all questions as best I could.   Aminta Chen MD   Time 28 minutes

## 2022-01-21 NOTE — PATIENT INSTRUCTIONS
1. Annual eye exam due, call your eye dr office  2. Fasting blood test in 6 months June 2022 at one of the [facility]  3.  CHEST X RAY soon  4. what GLP-1 vs. SGLT2 drugs might be on your formulary

## 2022-01-24 ENCOUNTER — TELEPHONE (OUTPATIENT)
Dept: PRIMARY CARE CLINIC | Age: 77
End: 2022-01-24

## 2022-01-24 ENCOUNTER — HOSPITAL ENCOUNTER (OUTPATIENT)
Dept: GENERAL RADIOLOGY | Age: 77
Discharge: HOME | End: 2022-01-24
Payer: MEDICARE

## 2022-01-24 ENCOUNTER — HOSPITAL ENCOUNTER (OUTPATIENT)
Age: 77
Discharge: HOME | End: 2022-01-24
Payer: MEDICARE

## 2022-01-24 DIAGNOSIS — R05.9 COUGH: ICD-10-CM

## 2022-01-24 DIAGNOSIS — J18.1 LEFT UPPER LOBE CONSOLIDATION (HCC): ICD-10-CM

## 2022-01-24 DIAGNOSIS — J18.9 PNEUMONIA OF BOTH LUNGS DUE TO INFECTIOUS ORGANISM, UNSPECIFIED PART OF LUNG: Primary | ICD-10-CM

## 2022-01-24 PROCEDURE — 71046 X-RAY EXAM CHEST 2 VIEWS: CPT

## 2022-01-24 RX ORDER — AZITHROMYCIN 250 MG/1
250 TABLET, FILM COATED ORAL SEE ADMIN INSTRUCTIONS
Qty: 6 TABLET | Refills: 0 | Status: SHIPPED | OUTPATIENT
Start: 2022-01-24 | End: 2022-01-29

## 2022-01-24 NOTE — TELEPHONE ENCOUNTER
Pt daughter Stated she would like antibiotics for her mom please send to [address] please be advise

## 2022-01-26 DIAGNOSIS — R93.89 ABNORMAL CHEST X-RAY: Primary | ICD-10-CM

## 2022-02-16 ENCOUNTER — HOSPITAL ENCOUNTER (OUTPATIENT)
Dept: GENERAL RADIOLOGY | Age: 77
Discharge: HOME | End: 2022-02-16
Payer: MEDICARE

## 2022-02-16 ENCOUNTER — HOSPITAL ENCOUNTER (OUTPATIENT)
Age: 77
Discharge: HOME | End: 2022-02-16
Payer: MEDICARE

## 2022-02-16 DIAGNOSIS — R93.89 ABNORMAL CHEST X-RAY: ICD-10-CM

## 2022-02-16 PROCEDURE — 71046 X-RAY EXAM CHEST 2 VIEWS: CPT

## 2022-02-22 DIAGNOSIS — R50.9 FEVER, UNSPECIFIED FEVER CAUSE: ICD-10-CM

## 2022-02-22 DIAGNOSIS — R91.8 INFILTRATE OF LOWER LOBE OF LUNG PRESENT ON IMAGING STUDY: ICD-10-CM

## 2022-02-22 DIAGNOSIS — R06.02 SHORTNESS OF BREATH: ICD-10-CM

## 2022-02-22 DIAGNOSIS — R05.9 COUGH: Primary | ICD-10-CM

## 2022-02-22 DIAGNOSIS — E11.65 TYPE 2 DIABETES MELLITUS WITH HYPERGLYCEMIA, WITHOUT LONG-TERM CURRENT USE OF INSULIN (HCC): ICD-10-CM

## 2022-03-02 DIAGNOSIS — R06.02 SHORTNESS OF BREATH: ICD-10-CM

## 2022-03-02 DIAGNOSIS — R05.9 COUGH: ICD-10-CM

## 2022-03-02 DIAGNOSIS — R91.8 INFILTRATE OF LOWER LOBE OF LUNG PRESENT ON IMAGING STUDY: ICD-10-CM

## 2022-03-02 DIAGNOSIS — R50.9 FEVER, UNSPECIFIED FEVER CAUSE: ICD-10-CM

## 2022-03-02 DIAGNOSIS — E11.65 TYPE 2 DIABETES MELLITUS WITH HYPERGLYCEMIA, WITHOUT LONG-TERM CURRENT USE OF INSULIN (HCC): ICD-10-CM

## 2022-03-02 LAB
ANION GAP SERPL CALCULATED.3IONS-SCNC: 13 MMOL/L (ref 3–16)
BASOPHILS ABSOLUTE: 0.1 K/UL (ref 0–0.2)
BASOPHILS RELATIVE PERCENT: 1 %
BUN BLDV-MCNC: 11 MG/DL (ref 7–20)
CALCIUM SERPL-MCNC: 9.8 MG/DL (ref 8.3–10.6)
CHLORIDE BLD-SCNC: 109 MMOL/L (ref 99–110)
CO2: 21 MMOL/L (ref 21–32)
CREAT SERPL-MCNC: 0.8 MG/DL (ref 0.6–1.2)
D DIMER: <200 NG/ML DDU (ref 0–229)
EOSINOPHILS ABSOLUTE: 0.1 K/UL (ref 0–0.6)
EOSINOPHILS RELATIVE PERCENT: 2.3 %
GFR AFRICAN AMERICAN: >60
GFR NON-AFRICAN AMERICAN: >60
GLUCOSE BLD-MCNC: 140 MG/DL (ref 70–99)
HCT VFR BLD CALC: 39 % (ref 36–48)
HEMOGLOBIN: 13.1 G/DL (ref 12–16)
LYMPHOCYTES ABSOLUTE: 1.6 K/UL (ref 1–5.1)
LYMPHOCYTES RELATIVE PERCENT: 24.4 %
MCH RBC QN AUTO: 29.7 PG (ref 26–34)
MCHC RBC AUTO-ENTMCNC: 33.6 G/DL (ref 31–36)
MCV RBC AUTO: 88.5 FL (ref 80–100)
MONOCYTES ABSOLUTE: 0.5 K/UL (ref 0–1.3)
MONOCYTES RELATIVE PERCENT: 8.2 %
NEUTROPHILS ABSOLUTE: 4.1 K/UL (ref 1.7–7.7)
NEUTROPHILS RELATIVE PERCENT: 64.1 %
PDW BLD-RTO: 15.5 % (ref 12.4–15.4)
PLATELET # BLD: 214 K/UL (ref 135–450)
PMV BLD AUTO: 9.6 FL (ref 5–10.5)
POTASSIUM SERPL-SCNC: 4.9 MMOL/L (ref 3.5–5.1)
PRO-BNP: 32 PG/ML (ref 0–449)
RBC # BLD: 4.41 M/UL (ref 4–5.2)
SODIUM BLD-SCNC: 143 MMOL/L (ref 136–145)
WBC # BLD: 6.4 K/UL (ref 4–11)

## 2022-03-03 LAB
ESTIMATED AVERAGE GLUCOSE: 154.2 MG/DL
HBA1C MFR BLD: 7 %

## 2022-03-05 LAB
QUANTI TB GOLD PLUS: NEGATIVE
QUANTI TB1 MINUS NIL: 0 IU/ML (ref 0–0.34)
QUANTI TB2 MINUS NIL: 0 IU/ML (ref 0–0.34)
QUANTIFERON MITOGEN: >10 IU/ML
QUANTIFERON NIL: 0.01 IU/ML

## 2022-03-28 ENCOUNTER — HOSPITAL ENCOUNTER (OUTPATIENT)
Dept: WOMENS IMAGING | Age: 77
Discharge: HOME | End: 2022-03-28
Payer: MEDICARE

## 2022-03-28 VITALS — WEIGHT: 220 LBS | HEIGHT: 65 IN | BODY MASS INDEX: 36.65 KG/M2

## 2022-03-28 DIAGNOSIS — Z12.31 ENCOUNTER FOR SCREENING MAMMOGRAM FOR MALIGNANT NEOPLASM OF BREAST: ICD-10-CM

## 2022-03-28 PROCEDURE — 77063 BREAST TOMOSYNTHESIS BI: CPT

## 2022-04-07 RX ORDER — GLIPIZIDE 5 MG/1
TABLET ORAL
Qty: 180 TABLET | Refills: 3 | OUTPATIENT
Start: 2022-04-07

## 2022-04-08 RX ORDER — GLIPIZIDE 5 MG/1
10 TABLET ORAL DAILY
Qty: 180 TABLET | Refills: 3 | Status: SHIPPED | OUTPATIENT
Start: 2022-04-08 | End: 2022-06-22

## 2022-04-08 NOTE — TELEPHONE ENCOUNTER
Pt called in requesting refill for her Glipizide 5 mg. Please send to the [facility] on [address].

## 2022-04-19 ENCOUNTER — PATIENT MESSAGE (OUTPATIENT)
Dept: PRIMARY CARE CLINIC | Age: 77
End: 2022-04-19

## 2022-04-19 DIAGNOSIS — G89.29 CHRONIC RIGHT SHOULDER PAIN: Primary | ICD-10-CM

## 2022-04-19 DIAGNOSIS — M25.562 CHRONIC PAIN OF LEFT KNEE: ICD-10-CM

## 2022-04-19 DIAGNOSIS — G89.29 CHRONIC PAIN OF LEFT KNEE: ICD-10-CM

## 2022-04-19 DIAGNOSIS — M25.511 CHRONIC RIGHT SHOULDER PAIN: Primary | ICD-10-CM

## 2022-04-19 NOTE — TELEPHONE ENCOUNTER
Called pt to get more information. Pt states that she is having pain in right shoulder x 4-5 months, and also having pain in her left knee for years on and off. Pt states that she seen Dr. Gaston Leonardo and orthopedic back in 2018 for her knee. Pt states that she has tried OTC tylenol and also tried some stretching and nothing is helping.  Please advise

## 2022-06-06 NOTE — TELEPHONE ENCOUNTER
Future Appointments    This patient does not currently have any appointments scheduled.     Past Visits    Date Provider Specialty Visit Type Primary Dx   01/21/2022 Gregg Beth MD Primary Care Office Visit Cough   12/13/2021 BRAD Arreguin CNP Primary Care Office Visit Routine general medical examination at a health care facility

## 2022-06-07 RX ORDER — SIMVASTATIN 20 MG
TABLET ORAL
Qty: 90 TABLET | Refills: 3 | Status: SHIPPED | OUTPATIENT
Start: 2022-06-07

## 2022-06-22 ENCOUNTER — OFFICE VISIT (OUTPATIENT)
Dept: PRIMARY CARE CLINIC | Age: 77
End: 2022-06-22
Payer: MEDICARE

## 2022-06-22 VITALS
OXYGEN SATURATION: 98 % | DIASTOLIC BLOOD PRESSURE: 63 MMHG | HEART RATE: 61 BPM | SYSTOLIC BLOOD PRESSURE: 134 MMHG | WEIGHT: 209 LBS | BODY MASS INDEX: 34.78 KG/M2 | TEMPERATURE: 97.9 F

## 2022-06-22 DIAGNOSIS — E11.22 TYPE 2 DM WITH CKD STAGE 2 AND HYPERTENSION (HCC): Primary | ICD-10-CM

## 2022-06-22 DIAGNOSIS — R63.4 RECENT WEIGHT LOSS: ICD-10-CM

## 2022-06-22 DIAGNOSIS — I10 ESSENTIAL HYPERTENSION: ICD-10-CM

## 2022-06-22 DIAGNOSIS — N18.2 TYPE 2 DM WITH CKD STAGE 2 AND HYPERTENSION (HCC): Primary | ICD-10-CM

## 2022-06-22 DIAGNOSIS — I12.9 TYPE 2 DM WITH CKD STAGE 2 AND HYPERTENSION (HCC): Primary | ICD-10-CM

## 2022-06-22 LAB — HBA1C MFR BLD: 5.9 %

## 2022-06-22 PROCEDURE — 83036 HEMOGLOBIN GLYCOSYLATED A1C: CPT | Performed by: INTERNAL MEDICINE

## 2022-06-22 PROCEDURE — 1123F ACP DISCUSS/DSCN MKR DOCD: CPT | Performed by: INTERNAL MEDICINE

## 2022-06-22 PROCEDURE — 3044F HG A1C LEVEL LT 7.0%: CPT | Performed by: INTERNAL MEDICINE

## 2022-06-22 PROCEDURE — 99213 OFFICE O/P EST LOW 20 MIN: CPT | Performed by: INTERNAL MEDICINE

## 2022-06-22 SDOH — ECONOMIC STABILITY: FOOD INSECURITY: WITHIN THE PAST 12 MONTHS, YOU WORRIED THAT YOUR FOOD WOULD RUN OUT BEFORE YOU GOT MONEY TO BUY MORE.: NEVER TRUE

## 2022-06-22 SDOH — ECONOMIC STABILITY: FOOD INSECURITY: WITHIN THE PAST 12 MONTHS, THE FOOD YOU BOUGHT JUST DIDN'T LAST AND YOU DIDN'T HAVE MONEY TO GET MORE.: NEVER TRUE

## 2022-06-22 ASSESSMENT — PATIENT HEALTH QUESTIONNAIRE - PHQ9
SUM OF ALL RESPONSES TO PHQ QUESTIONS 1-9: 0
1. LITTLE INTEREST OR PLEASURE IN DOING THINGS: 0
2. FEELING DOWN, DEPRESSED OR HOPELESS: 0
SUM OF ALL RESPONSES TO PHQ QUESTIONS 1-9: 0
SUM OF ALL RESPONSES TO PHQ9 QUESTIONS 1 & 2: 0
SUM OF ALL RESPONSES TO PHQ QUESTIONS 1-9: 0
SUM OF ALL RESPONSES TO PHQ QUESTIONS 1-9: 0

## 2022-06-22 ASSESSMENT — SOCIAL DETERMINANTS OF HEALTH (SDOH): HOW HARD IS IT FOR YOU TO PAY FOR THE VERY BASICS LIKE FOOD, HOUSING, MEDICAL CARE, AND HEATING?: NOT HARD AT ALL

## 2022-06-22 NOTE — PATIENT INSTRUCTIONS
1. STOP glipizide. 2. Non fasting blood test today. 3. Bring your home sugar record to f/u appt in 4 weeks. Try to get some readings in AM and PM.  4. Congratulations on 25# weight loss!

## 2022-06-22 NOTE — PROGRESS NOTES
2022    Edison Goodwin (:  1945) is a 68 y.o. female, here for evaluation of the following medical concerns:    Chief Complaint   Patient presents with    Follow-up    Diabetes       HPI  66-year-old -American female with history of type 2 diabetes, osteopenia, essential hypertension, fatty liver disease, who comes in for routine hypertension and diabetes follow-up. She a 60-pack-year smoking history quit remotely, and in that context with us on January she reported reports a 1 to 2-month history of increasing cough with low-grade fever without worsening wheeze or breathlessness. Chest x-ray showed mild interstitial groundglass infiltrate, unchanged on follow-up chest x-ray 3 weeks later. We organized chest CT in 2022 which patient has not completed. Unremarkable CBC BMP D-dimer BNP. A1c stable at 7% in March. In eg swelling on amlodipine prompted echo of EF 55 to 60% with grade 2 out of 4 diastolic dysfunction mild TR with RVSP 45 mm. Lower extremity ultrasound not completed by patient, and swelling is remained stable and  (year prior) chest CT negative for PE though diffuse mild interstitial infiltrates possibly due to pulmonary edema noted and  CT abdomen pelvis fatty liver, spine facet osteoarthritis, bilateral fat-containing inguinal canal hernias, circumferential calcification of the aorta but no pelvic masses. She states that her sugars are \"all over the place\" thought has not id'd formulary replacements for Januvia as requested. Her   resides long-term care for his dementia, with an indwelling [place] for the past 6 months, soon to undergo what sounds like TURP and possible suprapubic catheter placement. She has intentionally lost 25 pounds over the past few months, and is noticed that her sugars are dipping below 100.   She obtained this by doing daily fasting in the morning, yet continuing to take immediate release 10 mg Glucotrol in the morning. She eats what she wants between 5 and 9 PM, and likes to stay up at night. She forgot to bring her blood sugar record in to today's visit. Blood pressure today does not look too bad, she does not check her blood pressure outpatient. Review of Systems   Constitutional: Negative for activity change, appetite change, fatigue and unexpected weight change. HENT: Negative for dental problem, sinus pain, sore throat and trouble swallowing. Eyes: Negative for pain and visual disturbance. Respiratory: Negative for apnea, cough, chest tightness, shortness of breath and wheezing. Cardiovascular: Negative for chest pain and palpitations. Gastrointestinal: Negative for abdominal pain, blood in stool, constipation, diarrhea, nausea, rectal pain and vomiting. Endocrine: Negative for cold intolerance, heat intolerance, polydipsia, polyphagia and polyuria. Genitourinary: Negative for difficulty urinating, dysuria, flank pain, frequency, hematuria, pelvic pain, urgency, vaginal bleeding and vaginal discharge. Musculoskeletal: Negative for arthralgias, back pain, gait problem, joint swelling, myalgias, neck pain and neck stiffness. Skin: Negative for color change and rash. Neurological: Negative for dizziness, tremors, syncope, speech difficulty, weakness, light-headedness and headaches. Hematological: Negative for adenopathy. Does not bruise/bleed easily. Psychiatric/Behavioral: Negative for agitation, behavioral problems, decreased concentration, sleep disturbance and suicidal ideas. The patient is not nervous/anxious and is not hyperactive. Prior to Visit Medications    Medication Sig Taking?  Authorizing Provider   simvastatin (ZOCOR) 20 MG tablet TAKE ONE TABLET BY MOUTH DAILY Yes Regine Mann MD   ONETOUCH ULTRA strip USE ONE STRIP TO TEST TWICE A DAY Yes Regine Mann MD   amLODIPine (NORVASC) 10 MG tablet TAKE ONE TABLET BY MOUTH DAILY Yes Regine Mann MD   metFORMIN (GLUCOPHAGE) 500 MG tablet Take 2 tablets by mouth daily (with breakfast) Yes Gris España MD        Allergies   Allergen Reactions    Lisinopril Swelling    Aspirin Other (See Comments)     Ulcers    Lipitor      CPK       Past Medical History:   Diagnosis Date    Diverticulosis 2004    Duodenal ulcer 2005    Fatty liver     Gastric polyp 2005    Hyperlipidemia     Hyperplastic colon polyp 2004    NIDDM (non-insulin dependent diabetes mellitus)     Osteopenia Nov., 2010 - DEXA    Lumbar T score 0.2 and Hip T score 0.1    Osteopenia DEXA - Aug., 2013    Lumbar T score -0.1 and Hip T score -0/2    Other screening mammogram Nov. 1, 2010    Negative    Other screening mammogram March 14, 2012    Negative [facility] )    Other screening mammogram May 2, 2013    Benign    Other screening mammogram Nov. 2, 2015    Negative     Screening mammogram, encounter for *November 21, 2016    Negative    Screening mammogram, encounter for *February 21, 2018    Benign    Screening mammogram, encounter for *April 29, 2019    Benign    Villous adenoma 1999       Past Surgical History:   Procedure Laterality Date    COLONOSCOPY  Aug., 2007 ( 2012 )    Dr. Andres Macdonald - small hyperplastic polyp    COLONOSCOPY  April, 2012 ( 2017 )    Dr. Daren Chaudhari - diverticulosis, no polyps    COLONOSCOPY  *Aug.29, 2017 ( 2022 )    Dr. Daren Chaudhari - hyperplastic polyp    HYSTERECTOMY, TOTAL ABDOMINAL (CERVIX REMOVED)  1984    SALPINGO-OOPHORECTOMY  1984    unilateral    UPPER GASTROINTESTINAL ENDOSCOPY  Aug., 2005    duodenal ulcer, gastric polyp       Social History     Socioeconomic History    Marital status:      Spouse name: Froy Rahman Number of children: 3    Years of education: Not on file    Highest education level: Not on file   Occupational History    Occupation: hairdresser   Tobacco Use    Smoking status: Former Smoker     Packs/day: 2.00     Years: 30.00     Pack years: 60.00     Types: Cigarettes     Quit date: 1/1/1986 Years since quittin.4    Smokeless tobacco: Never Used   Vaping Use    Vaping Use: Never used   Substance and Sexual Activity    Alcohol use: No     Alcohol/week: 0.0 standard drinks    Drug use: No    Sexual activity: Not on file   Other Topics Concern    Not on file   Social History Narrative    Living Will:  No.             Social Determinants of Health     Financial Resource Strain: Low Risk     Difficulty of Paying Living Expenses: Not hard at all   Food Insecurity: No Food Insecurity    Worried About Running Out of Food in the Last Year: Never true    Dylan of Food in the Last Year: Never true   Transportation Needs:     Lack of Transportation (Medical): Not on file    Lack of Transportation (Non-Medical): Not on file   Physical Activity:     Days of Exercise per Week: Not on file    Minutes of Exercise per Session: Not on file   Stress:     Feeling of Stress : Not on file   Social Connections:     Frequency of Communication with Friends and Family: Not on file    Frequency of Social Gatherings with Friends and Family: Not on file    Attends Yarsani Services: Not on file    Active Member of [address] [place] [address] or Organizations: Not on file    Attends Club or Organization Meetings: Not on file    Marital Status: Not on file   Intimate Partner Violence:     Fear of Current or Ex-Partner: Not on file    Emotionally Abused: Not on file    Physically Abused: Not on file    Sexually Abused: Not on file   Housing Stability:     Unable to Pay for Housing in the Last Year: Not on file    Number of [place] in the Last Year: Not on file    Unstable Housing in the Last Year: Not on file        Family History   Problem Relation Age of Onset    Cancer Father 59        , colon cancer.  Cancer Mother 68        , nasopharyngeal cancer, IDDM, hypertension.     Breast Cancer Daughter        Vitals:    22 1407   BP: 134/63   Pulse: 61   Temp: 97.9 °F (36.6 °C)   TempSrc: Temporal SpO2: 98%   Weight: 209 lb (94.8 kg)     Estimated body mass index is 34.78 kg/m² as calculated from the following:    Height as of 3/28/22: 5' 5\" (1.651 m). Weight as of this encounter: 209 lb (94.8 kg). PHYSICAL EXAM  GENERAL:  Pleasant  female who looks her stated age, awake alert and oriented x3, no acute distress. Lungs:  Fair air entry no crackles or wheeze. EXTREMITIES: 1-2+ calf, ankle and foot edema, without skin breakdown, some hyperpigmentation. Doesn't go above the knees. Brisk capillary refill. Mild stocking paresthesia to monofilament challenge, 2+ pulses all 4 extremities. No diabetic foot ulcer. Neuro: Grossly nonfocal.  PSYCH:  No psychomotor retardation or agitation. Good eye contact. Unrestricted affect range. Mood congruent with affect. Linear thought.     LABS  Lab Review   Orders Only on 03/02/2022   Component Date Value    Pro-BNP 03/02/2022 32     D-Dimer, Quant 03/02/2022 <200     WBC 03/02/2022 6.4     RBC 03/02/2022 4.41     Hemoglobin 03/02/2022 13.1     Hematocrit 03/02/2022 39.0     MCV 03/02/2022 88.5     MCH 03/02/2022 29.7     MCHC 03/02/2022 33.6     RDW 03/02/2022 15.5*    Platelets 76/80/7844 214     MPV 03/02/2022 9.6     Neutrophils % 03/02/2022 64.1     Lymphocytes % 03/02/2022 24.4     Monocytes % 03/02/2022 8.2     Eosinophils % 03/02/2022 2.3     Basophils % 03/02/2022 1.0     Neutrophils Absolute 03/02/2022 4.1     Lymphocytes Absolute 03/02/2022 1.6     Monocytes Absolute 03/02/2022 0.5     Eosinophils Absolute 03/02/2022 0.1     Basophils Absolute 03/02/2022 0.1     Sodium 03/02/2022 143     Potassium 03/02/2022 4.9     Chloride 03/02/2022 109     CO2 03/02/2022 21     Anion Gap 03/02/2022 13     Glucose 03/02/2022 140*    BUN 03/02/2022 11     CREATININE 03/02/2022 0.8     GFR Non- 03/02/2022 >60     GFR  03/02/2022 >60     Calcium 03/02/2022 9.8     Hemoglobin A1C 03/02/2022 7.0     eAG 03/02/2022 154.2     Quantiferon TB Minus NIL 03/02/2022 Negative     Quantiferon TB1 Minus NIL 03/02/2022 0.00     Quantiferon TB2 Minus NIL 03/02/2022 0.00     QuantiFERON Mitogen 03/02/2022 >10.00     QuantiFERON Nil 03/02/2022 0.01    Orders Only on 12/13/2021   Component Date Value    Sodium 12/13/2021 140     Potassium 12/13/2021 4.5     Chloride 12/13/2021 103     CO2 12/13/2021 22     Anion Gap 12/13/2021 15     Glucose 12/13/2021 163*    BUN 12/13/2021 10     CREATININE 12/13/2021 0.8     GFR Non- 12/13/2021 >60     GFR  12/13/2021 >60     Calcium 12/13/2021 9.8     ALT 12/13/2021 26     AST 12/13/2021 22     Cholesterol, Total 12/13/2021 159     Triglycerides 12/13/2021 102     HDL 12/13/2021 56     LDL Calculated 12/13/2021 83     VLDL Cholesterol Calcula* 12/13/2021 20     TSH 12/13/2021 6.73*    Vit D, 25-Hydroxy 12/13/2021 46.0     Fructosamine 12/13/2021 273     Microalbumin, Random Uri* 12/13/2021 2.20*    Creatinine, Ur 12/13/2021 113.5     Microalbumin Creatinine * 12/13/2021 19.4     T4 Free 12/13/2021 1.0    Office Visit on 09/13/2021   Component Date Value    Hemoglobin A1C 09/13/2021 7.0    Orders Only on 05/24/2021   Component Date Value    TSH 05/24/2021 3.39     Vit D, 25-Hydroxy 05/24/2021 13.7*    Iron 05/24/2021 59     TIBC 05/24/2021 305     Iron Saturation 05/24/2021 19     ALT 05/24/2021 18     AST 05/24/2021 17     Cholesterol, Total 05/24/2021 143     Triglycerides 05/24/2021 101     HDL 05/24/2021 57     LDL Calculated 05/24/2021 66     VLDL Cholesterol Calcula* 05/24/2021 20     WBC 05/24/2021 8.9     RBC 05/24/2021 4.26     Hemoglobin 05/24/2021 12.5     Hematocrit 05/24/2021 37.1     MCV 05/24/2021 87.2     MCH 05/24/2021 29.3     MCHC 05/24/2021 33.5     RDW 05/24/2021 15.9*    Platelets 77/94/7327 235     MPV 05/24/2021 10.0     Neutrophils % 05/24/2021 73.9     Lymphocytes % 05/24/2021 17.2     Monocytes % 05/24/2021 7.1     Eosinophils % 05/24/2021 1.2     Basophils % 05/24/2021 0.6     Neutrophils Absolute 05/24/2021 6.6     Lymphocytes Absolute 05/24/2021 1.5     Monocytes Absolute 05/24/2021 0.6     Eosinophils Absolute 05/24/2021 0.1     Basophils Absolute 05/24/2021 0.1     Pro-BNP 05/24/2021 [address] [facility] Outpatient Visit on 05/21/2021   Component Date Value    Left Ventricular Ejectio* 05/21/2021 58     LVEF MODALITY 05/21/2021 ECHO    Office Visit on 05/17/2021   Component Date Value    Hemoglobin A1C 05/17/2021 7.2*    Microalb, Ur 05/17/2021 55.8     Creatinine Ur POCT 05/17/2021 158.7     Microalbumin Creatinine * 05/17/2021 35.2          ASSESSMENT/PLAN  1. Type 2 diabetes mellitus with hyperglycemia, without long-term current use of insulin ([facility])  A1c is down to 5.9% consistent with her 25 pound weight loss for which we will have her stop glipizide entirely, and have her bring her blood sugar records in check morning and evening. Eye exam due. No microalbuminuria December 2021. 2. Peripheral edema. Chronic dyspea on exertion. Amlodipine, BMI 38, and likely valvular venous insufficiency are contributory. Work up for pulmonary hypertension chronic DVT negative for DVT but showed elevated PASP 45 mm/ mm mild TR, systolic dysfunction and concentric LVH also mild, in this 60-pack-year smoker. She declined to pursue venous ultrasound, I suppose chronic DVT is possible. Currently not taking even baby aspirin to hemoglobin 12.5 in 2021. Wonder about SCOTTIE, pt declines eval.     Echo showed conc LVH and 2/4 DD in part due to LVH, but can't rule out ischemia. Unfortunately, angioedema with ACEi. Declines ad sestamibi, but will let me know if Ngo worsens. PM amlodipine, compression hose, low dose lasix, good BP control. Will embark on walking program, 6 pound weight gain since late last year without worsening dyspnea.     CTA chest 2020 negative for pulmonary embolism though mild diffuse groundglass opacification noted, this is probably what we are seeing on chest x-ray. We wanted to get updated chest CT to assess for interval change, oxygen saturation in the upper 90s. Reassuring hemoglobin TSH BNP. 3. Mixed hyperlipidemia  Excellent lipid profile normal LFTs recheck May 2022. 4. Routine adult health maintenance. Mild vitamin D deficiency. Vitamin D 14 goal 30-50. Not interested in bone density. Declines supplementation. Tdap due pt declines. Pneumovax 23 2013, Shingrix 2020,    Moderna Covid 2021 plus booster #1. Beyond age of Pap smear, mammogram.  TSH normal 2021.    5. Essential hypertension  Seemingly fair control. Good BMP. 6. Anemia, resolved  Hemoglobin 12.5 with normal iron studies, noting patient was heme positive, normal CBC MCV July 2020. Colonoscopy August 29, 2017 in the setting of a prior history of colon polyps and family history of colon cancer removed only a diminutive rectal polyp with follow-up colonoscopy tentatively August 2024.    7. History of adenomatous colon polyps. \"Colonoscopy December 2020 retrieved 8 polyps all adenomas, size unknown. Follow-up colonoscopy March 2021 showed no adenomas. Repeat colonoscopy due March 2024 if clinically appropriate. \"    Uncomfortable \"pulling\" sensation at anus, daily, stooling daily-qod, chuncky/loose relieved by stooling, nonbloody. Lactose intolerant. A few small old hemorrhoids on prio rectal exam, advised fiber supplementation previously. States not straining currently. Return in about 4 weeks (around 7/20/2022). It was a pleasure to visit with Ms. Tracey today. Answered all questions as best I could.   Dannie Mcpherson MD   Time 28 minutes

## 2022-07-20 ENCOUNTER — OFFICE VISIT (OUTPATIENT)
Dept: PRIMARY CARE CLINIC | Age: 77
End: 2022-07-20
Payer: MEDICARE

## 2022-07-20 VITALS
OXYGEN SATURATION: 96 % | WEIGHT: 210 LBS | HEART RATE: 65 BPM | TEMPERATURE: 98.2 F | BODY MASS INDEX: 34.95 KG/M2 | DIASTOLIC BLOOD PRESSURE: 51 MMHG | SYSTOLIC BLOOD PRESSURE: 113 MMHG

## 2022-07-20 DIAGNOSIS — E03.8 SUBCLINICAL HYPOTHYROIDISM: ICD-10-CM

## 2022-07-20 DIAGNOSIS — E55.9 VITAMIN D DEFICIENCY: ICD-10-CM

## 2022-07-20 DIAGNOSIS — E11.22 TYPE 2 DM WITH CKD STAGE 2 AND HYPERTENSION (HCC): Primary | ICD-10-CM

## 2022-07-20 DIAGNOSIS — I12.9 TYPE 2 DM WITH CKD STAGE 2 AND HYPERTENSION (HCC): Primary | ICD-10-CM

## 2022-07-20 DIAGNOSIS — E11.9 TYPE 2 DIABETES MELLITUS WITHOUT COMPLICATION, WITHOUT LONG-TERM CURRENT USE OF INSULIN (HCC): ICD-10-CM

## 2022-07-20 DIAGNOSIS — N18.2 TYPE 2 DM WITH CKD STAGE 2 AND HYPERTENSION (HCC): Primary | ICD-10-CM

## 2022-07-20 DIAGNOSIS — E78.2 MIXED HYPERLIPIDEMIA: ICD-10-CM

## 2022-07-20 DIAGNOSIS — I10 ESSENTIAL HYPERTENSION: ICD-10-CM

## 2022-07-20 DIAGNOSIS — Z00.00 ROUTINE ADULT HEALTH MAINTENANCE: ICD-10-CM

## 2022-07-20 PROCEDURE — 1123F ACP DISCUSS/DSCN MKR DOCD: CPT | Performed by: INTERNAL MEDICINE

## 2022-07-20 PROCEDURE — 99213 OFFICE O/P EST LOW 20 MIN: CPT | Performed by: INTERNAL MEDICINE

## 2022-07-20 PROCEDURE — 3044F HG A1C LEVEL LT 7.0%: CPT | Performed by: INTERNAL MEDICINE

## 2022-07-20 RX ORDER — GLIPIZIDE 5 MG/1
5 TABLET ORAL
Qty: 90 TABLET | Refills: 1 | Status: SHIPPED | OUTPATIENT
Start: 2022-07-20

## 2022-07-20 ASSESSMENT — PATIENT HEALTH QUESTIONNAIRE - PHQ9
SUM OF ALL RESPONSES TO PHQ QUESTIONS 1-9: 0
SUM OF ALL RESPONSES TO PHQ QUESTIONS 1-9: 0
SUM OF ALL RESPONSES TO PHQ9 QUESTIONS 1 & 2: 0
1. LITTLE INTEREST OR PLEASURE IN DOING THINGS: 0
2. FEELING DOWN, DEPRESSED OR HOPELESS: 0
SUM OF ALL RESPONSES TO PHQ QUESTIONS 1-9: 0
SUM OF ALL RESPONSES TO PHQ QUESTIONS 1-9: 0

## 2022-07-20 NOTE — PROGRESS NOTES
2022    Nica Tracey (:  1945) is a 68 y.o. female, here for evaluation of the following medical concerns:    Chief Complaint   Patient presents with    Follow-up    Diabetes       HPI  49-year-old -American female with history of type 2 diabetes, osteopenia, essential hypertension, fatty liver disease, who comes in for routine diabetes follow-up. When we saw her a month ago, we noted her intentional 25 pound weight loss, resulting in lower sugars and A1c dropping from 7 to 5.9% over a few months, at which time we start to stop her glipizide entirely. She comes in today to share the results of her blood sugars, however it appears that instead she continued to take 5 mg of glipizide immediate release in the morning, +500 mg twice daily metformin IR with mild dyspepsia. Blood pressure today does not look too bad, she does not check her blood pressure outpatient. Review of Systems   Constitutional: Negative for activity change, appetite change, fatigue and unexpected weight change. HENT: Negative for dental problem, sinus pain, sore throat and trouble swallowing. Eyes: Negative for pain and visual disturbance. Respiratory: Negative for apnea, cough, chest tightness, shortness of breath and wheezing. Cardiovascular: Negative for chest pain and palpitations. Gastrointestinal: Negative for abdominal pain, blood in stool, constipation, diarrhea, nausea, rectal pain and vomiting. Endocrine: Negative for cold intolerance, heat intolerance, polydipsia, polyphagia and polyuria. Genitourinary: Negative for difficulty urinating, dysuria, flank pain, frequency, hematuria, pelvic pain, urgency, vaginal bleeding and vaginal discharge. Musculoskeletal: Negative for arthralgias, back pain, gait problem, joint swelling, myalgias, neck pain and neck stiffness. Skin: Negative for color change and rash.    Neurological: Negative for dizziness, tremors, syncope, speech difficulty, weakness, light-headedness and headaches. Hematological: Negative for adenopathy. Does not bruise/bleed easily. Psychiatric/Behavioral: Negative for agitation, behavioral problems, decreased concentration, sleep disturbance and suicidal ideas. The patient is not nervous/anxious and is not hyperactive. Prior to Visit Medications    Medication Sig Taking?  Authorizing Provider   metFORMIN (GLUCOPHAGE) 500 MG tablet Take 1 tablet by mouth daily (with breakfast) Yes Ziyad Avina MD   glipiZIDE (GLUCOTROL) 5 MG tablet Take 1 tablet by mouth Daily with supper Yes Ziyad Avina MD   simvastatin (ZOCOR) 20 MG tablet TAKE ONE TABLET BY MOUTH DAILY Yes Ziyad Avina MD   [facility] ULTRA strip USE ONE STRIP TO TEST TWICE A DAY Yes Ziyad Avina MD   amLODIPine (NORVASC) 10 MG tablet TAKE ONE TABLET BY MOUTH DAILY Yes Ziyad Avina MD        Allergies   Allergen Reactions    Lisinopril Swelling    Aspirin Other (See Comments)     Ulcers    Lipitor      CPK       Past Medical History:   Diagnosis Date    Diverticulosis 2004    Duodenal ulcer 2005    Fatty liver     Gastric polyp 2005    Hyperlipidemia     Hyperplastic colon polyp 2004    NIDDM (non-insulin dependent diabetes mellitus)     Osteopenia Nov., 2010 - DEXA    Lumbar T score 0.2 and Hip T score 0.1    Osteopenia DEXA - Aug., 2013    Lumbar T score -0.1 and Hip T score -0/2    Other screening mammogram Nov. 1, 2010    Negative    Other screening mammogram March 14, 2012    Negative [facility] )    Other screening mammogram May 2, 2013    Benign    Other screening mammogram Nov. 2, 2015    Negative     Screening mammogram, encounter for *November 21, 2016    Negative    Screening mammogram, encounter for *February 21, 2018    Benign    Screening mammogram, encounter for *April 29, 2019    Benign    Villous adenoma 1999       Past Surgical History:   Procedure Laterality Date    COLONOSCOPY  Aug., 2007 ( 2012 )    Dr. Sindy Silva - small hyperplastic polyp COLONOSCOPY   (  )    Dr. Nikki Headley - diverticulosis, no polyps    COLONOSCOPY  *Aug.29, 2017 (  )    Dr. Nikki Headley - hyperplastic polyp    HYSTERECTOMY, TOTAL ABDOMINAL (CERVIX REMOVED)      SALPINGO-OOPHORECTOMY      unilateral    UPPER GASTROINTESTINAL ENDOSCOPY  Aug., 2005    duodenal ulcer, gastric polyp       Social History     Socioeconomic History    Marital status:      Spouse name: Timbo Wilson    Number of children: 3    Years of education: Not on file    Highest education level: Not on file   Occupational History    Occupation: [employer]   Tobacco Use    Smoking status: Former     Packs/day: 2.00     Years: 30.00     Pack years: 60.00     Types: Cigarettes     Quit date: 1986     Years since quittin.5    Smokeless tobacco: Never   Vaping Use    Vaping Use: Never used   Substance and Sexual Activity    Alcohol use: No     Alcohol/week: 0.0 standard drinks    Drug use: No    Sexual activity: Not on file   Other Topics Concern    Not on file   Social History Narrative    Living Will:  No.             Social Determinants of Health     Financial Resource Strain: Low Risk     Difficulty of Paying Living Expenses: Not hard at all   Food Insecurity: No Food Insecurity    Worried About Running Out of Food in the Last Year: Never true    Ran Out of Food in the Last Year: Never true   Transportation Needs: Not on file   Physical Activity: Not on file   Stress: Not on file   Social Connections: Not on file   Intimate Partner Violence: Not on file   Housing Stability: Not on file        Family History   Problem Relation Age of Onset    Cancer Father 59        , colon cancer. Cancer Mother 68        , nasopharyngeal cancer, IDDM, hypertension.     Breast Cancer Daughter        Vitals:    22 1319   BP: (!) 113/51   Pulse: 65   Temp: 98.2 °F (36.8 °C)   TempSrc: Temporal   SpO2: 96%   Weight: 210 lb (95.3 kg)     Estimated body mass index is 34.95 kg/m² as calculated from the following:    Height as of 3/28/22: 5' 5\" (1.651 m). Weight as of this encounter: 210 lb (95.3 kg). PHYSICAL EXAM  GENERAL:  Pleasant  female who looks her stated age, awake alert and oriented x3, no acute distress. PSYCH:  No psychomotor retardation or agitation. Good eye contact. Unrestricted affect range. Mood congruent with affect. Linear thought.     LABS  Lab Review   Office Visit on 06/22/2022   Component Date Value    Hemoglobin A1C 06/22/2022 5.9    Orders Only on 03/02/2022   Component Date Value    Pro-BNP 03/02/2022 32     D-Dimer, Quant 03/02/2022 <200     WBC 03/02/2022 6.4     RBC 03/02/2022 4.41     Hemoglobin 03/02/2022 13.1     Hematocrit 03/02/2022 39.0     MCV 03/02/2022 88.5     MCH 03/02/2022 29.7     MCHC 03/02/2022 33.6     RDW 03/02/2022 15.5 (A)    Platelets 22/39/4602 214     MPV 03/02/2022 9.6     Neutrophils % 03/02/2022 64.1     Lymphocytes % 03/02/2022 24.4     Monocytes % 03/02/2022 8.2     Eosinophils % 03/02/2022 2.3     Basophils % 03/02/2022 1.0     Neutrophils Absolute 03/02/2022 4.1     Lymphocytes Absolute 03/02/2022 1.6     Monocytes Absolute 03/02/2022 0.5     Eosinophils Absolute 03/02/2022 0.1     Basophils Absolute 03/02/2022 0.1     Sodium 03/02/2022 143     Potassium 03/02/2022 4.9     Chloride 03/02/2022 109     CO2 03/02/2022 21     Anion Gap 03/02/2022 13     Glucose 03/02/2022 140 (A)    BUN 03/02/2022 11     Creatinine 03/02/2022 0.8     GFR Non- 03/02/2022 >60     GFR  03/02/2022 >60     Calcium 03/02/2022 9.8     Hemoglobin A1C 03/02/2022 7.0     eAG 03/02/2022 154.2     Quantiferon TB Minus NIL 03/02/2022 Negative     Quantiferon TB1 Minus NIL 03/02/2022 0.00     Quantiferon TB2 Minus NIL 03/02/2022 0.00     QuantiFERON Mitogen 03/02/2022 >10.00     QuantiFERON Nil 03/02/2022 0.01    Orders Only on 12/13/2021   Component Date Value    Sodium 12/13/2021 140     Potassium 12/13/2021 4.5     Chloride 12/13/2021 103     CO2 12/13/2021 22     Anion Gap 12/13/2021 15     Glucose 12/13/2021 163 (A)    BUN 12/13/2021 10     Creatinine 12/13/2021 0.8     GFR Non- 12/13/2021 >60     GFR  12/13/2021 >60     Calcium 12/13/2021 9.8     ALT 12/13/2021 26     AST 12/13/2021 22     Cholesterol, Total 12/13/2021 159     Triglycerides 12/13/2021 102     HDL 12/13/2021 56     LDL Calculated 12/13/2021 83     VLDL Cholesterol Calcula* 12/13/2021 20     TSH 12/13/2021 6.73 (A)    Vit D, 25-Hydroxy 12/13/2021 46.0     Fructosamine 12/13/2021 273     Microalbumin, Random Uri* 12/13/2021 2.20 (A)    Creatinine, Ur 12/13/2021 113.5     Microalbumin Creatinine * 12/13/2021 19.4     T4 Free 12/13/2021 1.0    Office Visit on 09/13/2021   Component Date Value    Hemoglobin A1C 09/13/2021 7.0    Orders Only on 05/24/2021   Component Date Value    TSH 05/24/2021 3.39     Vit D, 25-Hydroxy 05/24/2021 13.7 (A)    Iron 05/24/2021 59     TIBC 05/24/2021 305     Iron Saturation 05/24/2021 19     ALT 05/24/2021 18     AST 05/24/2021 17     Cholesterol, Total 05/24/2021 143     Triglycerides 05/24/2021 101     HDL 05/24/2021 57     LDL Calculated 05/24/2021 66     VLDL Cholesterol Calcula* 05/24/2021 20     WBC 05/24/2021 8.9     RBC 05/24/2021 4.26     Hemoglobin 05/24/2021 12.5     Hematocrit 05/24/2021 37.1     MCV 05/24/2021 87.2     MCH 05/24/2021 29.3     MCHC 05/24/2021 33.5     RDW 05/24/2021 15.9 (A)    Platelets 45/60/8337 235     MPV 05/24/2021 10.0     Neutrophils % 05/24/2021 73.9     Lymphocytes % 05/24/2021 17.2     Monocytes % 05/24/2021 7.1     Eosinophils % 05/24/2021 1.2     Basophils % 05/24/2021 0.6     Neutrophils Absolute 05/24/2021 6.6     Lymphocytes Absolute 05/24/2021 1.5     Monocytes Absolute 05/24/2021 0.6     Eosinophils Absolute 05/24/2021 0.1     Basophils Absolute 05/24/2021 0.1     Pro-BNP 05/24/2021 21    Hospital Outpatient Visit on 05/21/2021   Component Date Value    Left Ventricular Ejectio* 05/21/2021 58     LVEF MODALITY 05/21/2021 ECHO          ASSESSMENT/PLAN  1. Type 2 diabetes mellitus with hyperglycemia, without long-term current use of insulin ([facility])  A1c is down to 5.9% last visit consistent with her 25 pound weight loss. Forgot to bring her blood sugars in but estimates that they are ranging from 90-1 20 in the morning. She does not have any evening sugars to share. Due to dyspepsia we will have the patient take only metformin in the morning because she notices the dyspepsia in the evening primarily, and have her take the glipizide immediate release before her evening meal.    Patient will share with me a weeks worth of sugars on her new regimen, 7 checks in the morning 3 checks at bedtime. She is something of a night owl, and snacks after dinner. She does not really eat breakfast or lunch per se. Eye exam due, last 2020! Nena Davis No microalbuminuria December 2021. Updated labs ordered. Patient commits to making eye exam appointment. 2. Peripheral edema. Chronic dyspea on exertion. DUFF improved substantially with improved conditioning, weight loss. Amlodipine, BMI 38 dropped to 35, and likely valvular venous insufficiency are contributory. Work up for pulmonary hypertension chronic DVT negative for DVT but showed elevated PASP 45 mm/ mm mild TR, systolic dysfunction and concentric LVH also mild, in this 60-pack-year smoker. She declined to pursue venous ultrasound, I suppose chronic DVT is possible. Currently not taking even baby aspirin to hemoglobin 12.5 in 2021. Wonder about SCOTTIE, pt declines eval.     Echo showed conc LVH and 2/4 DD in part due to LVH, but can't rule out ischemia. Unfortunately, angioedema with ACEi. Declines ad sestamibi, but will let me know if Duff worsens. PM amlodipine, compression hose, low dose lasix, good BP control.  Will embark on walking program, 6 pound weight gain since late last year without worsening dyspnea. CTA chest 2020 negative for pulmonary embolism though mild diffuse groundglass opacification noted, this is probably what we are seeing on chest x-ray. We wanted to get updated chest CT to assess for interval change, oxygen saturation in the upper 90s. Reassuring hemoglobin TSH BNP in the past.    3. Mixed hyperlipidemia  Excellent lipid profile normal LFTs recheck ordered, to be done in 3 months    4. Routine adult health maintenance. Mild vitamin D deficiency. Vitamin D 14 goal 30-50. Not interested in bone density. Declines supplementation. Tdap due pt declines. Pneumovax 23 2013, Shingrix 2020,  Moderna Covid 2021 plus booster #1. Beyond age of Pap smear, mammogram.  TSH normal 2021. Update vitamin D TSH. 5. Essential hypertension  Seemingly better controlled with weight loss, update BMP microalbumin. 6. Anemia, resolved  Hemoglobin 12.5 with normal iron studies, noting patient was heme positive, normal CBC MCV July 2020. Colonoscopy August 29, 2017 in the setting of a prior history of colon polyps and family history of colon cancer removed only a diminutive rectal polyp with follow-up colonoscopy tentatively August 2024.    7. History of adenomatous colon polyps. \"Colonoscopy December 2020 retrieved 8 polyps all adenomas, size unknown. Follow-up colonoscopy March 2021 showed no adenomas. Repeat colonoscopy due March 2024 if clinically appropriate. \"    Uncomfortable \"pulling\" sensation at anus, daily, stooling daily-qod, chuncky/loose relieved by stooling, nonbloody. Lactose intolerant. A few small old hemorrhoids on prio rectal exam, advised fiber supplementation previously. States not straining currently. Return in about 3 months (around 10/20/2022) for aftrer labs. It was a pleasure to visit with Ms. Tracey today. Answered all questions as best I could.   Helga Rae MD   Time 28 minutes

## 2022-07-20 NOTE — PATIENT INSTRUCTIONS
Metformin IR 500mg in AM  Move IR Glipizide 5mg to PM with your biggest meal of the day  Can you check blood sugar at bedtime 3x only, and share results with me, on this new regimen  Let me see a week's worth of AM sugars  Fasting labs pre ordered late October 2022

## 2022-09-26 ENCOUNTER — PATIENT MESSAGE (OUTPATIENT)
Dept: PRIMARY CARE CLINIC | Age: 77
End: 2022-09-26

## 2022-09-26 DIAGNOSIS — R30.0 DYSURIA: Primary | ICD-10-CM

## 2022-09-26 NOTE — TELEPHONE ENCOUNTER
From: Darius Acosta Showes  To:  Moira Renee  Sent: 9/26/2022 3:19 PM EDT  Subject: Non urgent medical     My mom Makenna Santos feel like she has a UTI is there anyway she can she can give a urine sample   Do she need a order from the doctor or can she just go to lab

## 2022-09-27 ENCOUNTER — HOSPITAL ENCOUNTER (OUTPATIENT)
Age: 77
Discharge: HOME | End: 2022-09-27
Payer: MEDICARE

## 2022-09-27 ENCOUNTER — PATIENT MESSAGE (OUTPATIENT)
Dept: PRIMARY CARE CLINIC | Age: 77
End: 2022-09-27

## 2022-09-27 DIAGNOSIS — E11.65 TYPE 2 DIABETES MELLITUS WITH HYPERGLYCEMIA, WITHOUT LONG-TERM CURRENT USE OF INSULIN (HCC): ICD-10-CM

## 2022-09-27 DIAGNOSIS — E55.9 VITAMIN D DEFICIENCY: ICD-10-CM

## 2022-09-27 DIAGNOSIS — Z00.00 ROUTINE ADULT HEALTH MAINTENANCE: ICD-10-CM

## 2022-09-27 DIAGNOSIS — N18.2 TYPE 2 DM WITH CKD STAGE 2 AND HYPERTENSION (HCC): ICD-10-CM

## 2022-09-27 DIAGNOSIS — R30.0 DYSURIA: ICD-10-CM

## 2022-09-27 DIAGNOSIS — E11.22 TYPE 2 DM WITH CKD STAGE 2 AND HYPERTENSION (HCC): ICD-10-CM

## 2022-09-27 DIAGNOSIS — I10 ESSENTIAL HYPERTENSION: ICD-10-CM

## 2022-09-27 DIAGNOSIS — E03.8 SUBCLINICAL HYPOTHYROIDISM: ICD-10-CM

## 2022-09-27 DIAGNOSIS — E11.9 TYPE 2 DIABETES MELLITUS WITHOUT COMPLICATION, WITHOUT LONG-TERM CURRENT USE OF INSULIN (HCC): ICD-10-CM

## 2022-09-27 DIAGNOSIS — I12.9 TYPE 2 DM WITH CKD STAGE 2 AND HYPERTENSION (HCC): ICD-10-CM

## 2022-09-27 DIAGNOSIS — E78.2 MIXED HYPERLIPIDEMIA: ICD-10-CM

## 2022-09-27 LAB
ALT SERPL-CCNC: 12 U/L (ref 10–40)
ANION GAP SERPL CALCULATED.3IONS-SCNC: 14 MMOL/L (ref 3–16)
AST SERPL-CCNC: 13 U/L (ref 15–37)
BACTERIA: ABNORMAL /HPF
BILIRUBIN URINE: NEGATIVE
BLOOD, URINE: NEGATIVE
BUN BLDV-MCNC: 9 MG/DL (ref 7–20)
CALCIUM SERPL-MCNC: 9.6 MG/DL (ref 8.3–10.6)
CHLORIDE BLD-SCNC: 105 MMOL/L (ref 99–110)
CHOLESTEROL, TOTAL: 195 MG/DL (ref 0–199)
CLARITY: CLEAR
CO2: 24 MMOL/L (ref 21–32)
COLOR: ABNORMAL
CREAT SERPL-MCNC: 0.7 MG/DL (ref 0.6–1.2)
CREATININE URINE: 130 MG/DL (ref 28–259)
EPITHELIAL CELLS, UA: 1 /HPF (ref 0–5)
GFR AFRICAN AMERICAN: >60
GFR NON-AFRICAN AMERICAN: >60
GLUCOSE BLD-MCNC: 141 MG/DL (ref 70–99)
GLUCOSE URINE: NEGATIVE MG/DL
HDLC SERPL-MCNC: 67 MG/DL (ref 40–60)
HEMOGLOBIN: 13.1 G/DL (ref 12–16)
HYALINE CASTS: 0 /LPF (ref 0–8)
KETONES, URINE: ABNORMAL MG/DL
LDL CHOLESTEROL CALCULATED: 104 MG/DL
LEUKOCYTE ESTERASE, URINE: ABNORMAL
MICROALBUMIN UR-MCNC: 3 MG/DL
MICROALBUMIN/CREAT UR-RTO: 23.1 MG/G (ref 0–30)
MICROSCOPIC EXAMINATION: YES
NITRITE, URINE: NEGATIVE
PH UA: 6 (ref 5–8)
POTASSIUM SERPL-SCNC: 4.3 MMOL/L (ref 3.5–5.1)
PROTEIN UA: ABNORMAL MG/DL
RBC UA: 1 /HPF (ref 0–4)
SODIUM BLD-SCNC: 143 MMOL/L (ref 136–145)
SPECIFIC GRAVITY UA: 1.02 (ref 1–1.03)
T4 FREE: 0.8 NG/DL (ref 0.9–1.8)
TRIGL SERPL-MCNC: 120 MG/DL (ref 0–150)
TSH REFLEX: 4.56 UIU/ML (ref 0.27–4.2)
URINE TYPE: ABNORMAL
UROBILINOGEN, URINE: 0.2 E.U./DL
VITAMIN D 25-HYDROXY: 24 NG/ML
VLDLC SERPL CALC-MCNC: 24 MG/DL
WBC UA: 139 /HPF (ref 0–5)

## 2022-09-27 PROCEDURE — 36415 COLL VENOUS BLD VENIPUNCTURE: CPT

## 2022-09-27 PROCEDURE — 82306 VITAMIN D 25 HYDROXY: CPT

## 2022-09-27 PROCEDURE — 80061 LIPID PANEL: CPT

## 2022-09-27 PROCEDURE — 84443 ASSAY THYROID STIM HORMONE: CPT

## 2022-09-27 PROCEDURE — 84439 ASSAY OF FREE THYROXINE: CPT

## 2022-09-27 PROCEDURE — 85018 HEMOGLOBIN: CPT

## 2022-09-27 PROCEDURE — 84450 TRANSFERASE (AST) (SGOT): CPT

## 2022-09-27 PROCEDURE — 82043 UR ALBUMIN QUANTITATIVE: CPT

## 2022-09-27 PROCEDURE — 82985 ASSAY OF GLYCATED PROTEIN: CPT

## 2022-09-27 PROCEDURE — 80048 BASIC METABOLIC PNL TOTAL CA: CPT

## 2022-09-27 PROCEDURE — 82570 ASSAY OF URINE CREATININE: CPT

## 2022-09-27 PROCEDURE — 84460 ALANINE AMINO (ALT) (SGPT): CPT

## 2022-09-27 PROCEDURE — 83036 HEMOGLOBIN GLYCOSYLATED A1C: CPT

## 2022-09-27 PROCEDURE — 87086 URINE CULTURE/COLONY COUNT: CPT

## 2022-09-27 PROCEDURE — 87186 SC STD MICRODIL/AGAR DIL: CPT

## 2022-09-27 PROCEDURE — 81001 URINALYSIS AUTO W/SCOPE: CPT

## 2022-09-27 PROCEDURE — 87077 CULTURE AEROBIC IDENTIFY: CPT

## 2022-09-28 DIAGNOSIS — E03.9 HYPOTHYROIDISM, UNSPECIFIED TYPE: Primary | ICD-10-CM

## 2022-09-28 LAB
ESTIMATED AVERAGE GLUCOSE: 148.5 MG/DL
HBA1C MFR BLD: 6.8 %

## 2022-09-28 RX ORDER — LEVOTHYROXINE SODIUM 0.03 MG/1
25 TABLET ORAL DAILY
Qty: 80 TABLET | Refills: 1 | Status: SHIPPED | OUTPATIENT
Start: 2022-09-28

## 2022-09-29 LAB
FRUCTOSAMINE: 256 UMOL/L (ref 205–285)
ORGANISM: ABNORMAL
URINE CULTURE, ROUTINE: ABNORMAL

## 2022-09-29 RX ORDER — SULFAMETHOXAZOLE AND TRIMETHOPRIM 800; 160 MG/1; MG/1
1 TABLET ORAL 2 TIMES DAILY
Qty: 10 TABLET | Refills: 0 | Status: SHIPPED | OUTPATIENT
Start: 2022-09-29 | End: 2022-10-04

## 2022-11-14 ENCOUNTER — TELEPHONE (OUTPATIENT)
Dept: PRIMARY CARE CLINIC | Age: 77
End: 2022-11-14

## 2022-11-17 ENCOUNTER — TELEPHONE (OUTPATIENT)
Dept: PRIMARY CARE CLINIC | Age: 77
End: 2022-11-17

## 2022-11-17 RX ORDER — AMLODIPINE BESYLATE 10 MG/1
TABLET ORAL
Qty: 90 TABLET | Refills: 3 | Status: CANCELLED | OUTPATIENT
Start: 2022-11-17

## 2022-11-17 NOTE — TELEPHONE ENCOUNTER
Former [facility] patient has NTP with Dr. Diana Olivarez on 12/20/22. Patient needs refill of amlodipine in the meantime. Please call and advise when complete.

## 2022-11-18 RX ORDER — AMLODIPINE BESYLATE 10 MG/1
TABLET ORAL
Qty: 90 TABLET | Refills: 3 | Status: SHIPPED | OUTPATIENT
Start: 2022-11-18

## 2022-12-20 ENCOUNTER — OFFICE VISIT (OUTPATIENT)
Dept: PRIMARY CARE CLINIC | Age: 77
End: 2022-12-20
Payer: MEDICARE

## 2022-12-20 VITALS
WEIGHT: 222 LBS | OXYGEN SATURATION: 96 % | TEMPERATURE: 97.9 F | HEART RATE: 73 BPM | DIASTOLIC BLOOD PRESSURE: 72 MMHG | SYSTOLIC BLOOD PRESSURE: 138 MMHG | BODY MASS INDEX: 36.94 KG/M2

## 2022-12-20 DIAGNOSIS — I12.9 TYPE 2 DM WITH CKD STAGE 2 AND HYPERTENSION (HCC): ICD-10-CM

## 2022-12-20 DIAGNOSIS — E78.00 PURE HYPERCHOLESTEROLEMIA: ICD-10-CM

## 2022-12-20 DIAGNOSIS — Z00.00 MEDICARE ANNUAL WELLNESS VISIT, SUBSEQUENT: Primary | ICD-10-CM

## 2022-12-20 DIAGNOSIS — E11.22 TYPE 2 DM WITH CKD STAGE 2 AND HYPERTENSION (HCC): ICD-10-CM

## 2022-12-20 DIAGNOSIS — Z86.010 HX OF ADENOMATOUS COLONIC POLYPS: ICD-10-CM

## 2022-12-20 DIAGNOSIS — R13.19 ESOPHAGEAL DYSPHAGIA: ICD-10-CM

## 2022-12-20 DIAGNOSIS — N18.2 TYPE 2 DM WITH CKD STAGE 2 AND HYPERTENSION (HCC): ICD-10-CM

## 2022-12-20 DIAGNOSIS — Z87.891 FORMER CIGARETTE SMOKER: ICD-10-CM

## 2022-12-20 DIAGNOSIS — R06.83 SNORING: ICD-10-CM

## 2022-12-20 DIAGNOSIS — E55.9 VITAMIN D DEFICIENCY: ICD-10-CM

## 2022-12-20 DIAGNOSIS — E11.65 TYPE 2 DIABETES MELLITUS WITH HYPERGLYCEMIA, WITHOUT LONG-TERM CURRENT USE OF INSULIN (HCC): ICD-10-CM

## 2022-12-20 PROBLEM — Z86.0101 HX OF ADENOMATOUS COLONIC POLYPS: Status: ACTIVE | Noted: 2022-12-20

## 2022-12-20 PROCEDURE — G0439 PPPS, SUBSEQ VISIT: HCPCS | Performed by: INTERNAL MEDICINE

## 2022-12-20 PROCEDURE — 3044F HG A1C LEVEL LT 7.0%: CPT | Performed by: INTERNAL MEDICINE

## 2022-12-20 PROCEDURE — 1123F ACP DISCUSS/DSCN MKR DOCD: CPT | Performed by: INTERNAL MEDICINE

## 2022-12-20 RX ORDER — SIMVASTATIN 40 MG
40 TABLET ORAL NIGHTLY
Qty: 90 TABLET | Refills: 1 | Status: SHIPPED | OUTPATIENT
Start: 2022-12-20

## 2022-12-20 ASSESSMENT — PATIENT HEALTH QUESTIONNAIRE - PHQ9
SUM OF ALL RESPONSES TO PHQ QUESTIONS 1-9: 0
SUM OF ALL RESPONSES TO PHQ9 QUESTIONS 1 & 2: 0
2. FEELING DOWN, DEPRESSED OR HOPELESS: 0
SUM OF ALL RESPONSES TO PHQ QUESTIONS 1-9: 0
SUM OF ALL RESPONSES TO PHQ QUESTIONS 1-9: 0
2. FEELING DOWN, DEPRESSED OR HOPELESS: 0
1. LITTLE INTEREST OR PLEASURE IN DOING THINGS: 0
SUM OF ALL RESPONSES TO PHQ9 QUESTIONS 1 & 2: 0
1. LITTLE INTEREST OR PLEASURE IN DOING THINGS: 0
SUM OF ALL RESPONSES TO PHQ QUESTIONS 1-9: 0
SUM OF ALL RESPONSES TO PHQ QUESTIONS 1-9: 0

## 2022-12-20 NOTE — PATIENT INSTRUCTIONS
Advance Directives: Care Instructions  Overview  An advance directive is a legal way to state your wishes at the end of your life. It tells your family and your doctor what to do if you can't say what you want. There are two main types of advance directives. You can change them any time your wishes change. Living will. This form tells your family and your doctor your wishes about life support and other treatment. The form is also called a declaration. Medical power of . This form lets you name a person to make treatment decisions for you when you can't speak for yourself. This person is called a health care agent (health care proxy, health care surrogate). The form is also called a durable power of  for health care. If you do not have an advance directive, decisions about your medical care may be made by a family member, or by a doctor or a  who doesn't know you. It may help to think of an advance directive as a gift to the people who care for you. If you have one, they won't have to make tough decisions by themselves. For more information, including forms for your state, see the [address] website (www.caringinfo.org/planning/advance-directives/). Follow-up care is a key part of your treatment and safety. Be sure to make and go to all appointments, and call your doctor if you are having problems. It's also a good idea to know your test results and keep a list of the medicines you take. What should you include in an advance directive? Many states have a unique advance directive form. (It may ask you to address specific issues.) Or you might use a universal form that's approved by many states. If your form doesn't tell you what to address, it may be hard to know what to include in your advance directive. Use the questions below to help you get started. Who do you want to make decisions about your medical care if you are not able to?   What life-support measures do you want if you have a serious illness that gets worse over time or can't be cured? What are you most afraid of that might happen? (Maybe you're afraid of having pain, losing your independence, or being kept alive by machines.)  Where would you prefer to die? (Your home? A hospital? A nursing home?)  Do you want to donate your organs when you die? Do you want certain Faith practices performed before you die? When should you call for help? Be sure to contact your doctor if you have any questions. Where can you learn more? Go to http://www.barnes.com/ and enter R264 to learn more about \"Advance Directives: Care Instructions. \"  Current as of: June 16, 2022               Content Version: 13.5  © 2006-2022 Healthwise, Incorporated. Care instructions adapted under license by [facility] ([facility]). If you have questions about a medical condition or this instruction, always ask your healthcare professional. [address] any warranty or liability for your use of this information. Learning About Healthy Weight  What is a healthy weight? A healthy weight is the weight at which you feel good about yourself and have energy for work and play. It's also one that lowers your risk for health problems. What can you do to stay at a healthy weight? It can be hard to stay at a healthy weight, especially when fast food, vending-machine snacks, and processed foods are so easy to find. And with your busy lifestyle, activity may be low on your list of things to do. But staying at a healthy weight may be easier than you think. Here are some dos and don'ts for staying at a healthy weight. Do eat healthy foods  The kinds of foods you eat have a big impact on both your weight and your health. Reaching and staying at a healthy weight is not about going on a diet. It's about making healthier food choices every day and changing your diet for good.   Healthy eating means eating a variety of foods so that you get all the nutrients you need. Your body needs protein, carbohydrate, and fats for energy. They keep your heart beating, your brain active, and your muscles working. On most days, try to eat from each food group. This means eating a variety of: Whole grains, such as whole wheat breads and pastas. Fruits and vegetables. Dairy products, such as low-fat milk, yogurt, and cheese. Lean proteins, such as all types of fish, chicken without the skin, and beans. Don't have too much or too little of one thing. All foods, if eaten in moderation, can be part of healthy eating. Even sweets can be okay. If your favorite foods are high in fat, salt, sugar, or calories, limit how often you eat them. Eat smaller servings, or look for healthy substitutes. Do watch what you eat  Many people eat more than their bodies need. Part of staying at a healthy weight means learning how much food you really need from day to day and not eating more than that. Even with healthy foods, eating too much can make you gain weight. Having a well-balanced diet means that you eat enough, but not too much, and that your food gives you the nutrients you need to stay healthy. So listen to your body. Eat when you're hungry. Stop when you feel satisfied. It's a good idea to have healthy snacks ready for when you get hungry. Keep healthy snacks with you at work, in your car, and at home. If you have a healthy snack easily available, you'll be less likely to pick a candy bar or bag of chips from a vending machine instead. Some healthy snacks you might want to keep on hand are fruit, low-fat yogurt, string cheese, low-fat microwave popcorn, raisins and other dried fruit, nuts, whole wheat crackers, pretzels, carrots, celery sticks, and broccoli. Do some physical activity  A big part of reaching and staying at a healthy weight is being active. When you're active, you burn calories. This makes it easier to reach and stay at a healthy weight.  When you're active on a regular basis, your body burns more calories, even when you're at rest. Being active helps you lose fat and build lean muscle. Try to be active for at least 1 hour every day. This may sound like a lot, but it's okay to be active in smaller blocks of time that add up to 1 hour a day. Any activity that makes your heart beat faster and keeps it there for a while counts. A brisk walk, run, or swim will get your heart beating faster. So will climbing stairs, shooting baskets, or cycling. Even some household chores like vacuuming and mowing the lawn will get your heart rate up. Pick activities that you enjoy--ones that make your heart beat faster, your muscles stronger, and your muscles and joints more flexible. If you find more than one thing you like doing, do them all. You don't have to do the same thing every day. Don't diet  Diets don't work. Diets are temporary. Because you give up so much when you diet, you may be hungry and think about food all the time. And after you stop dieting, you also may overeat to make up for what you missed. Most people who diet end up gaining back the pounds they lost--and more. Remember that healthy bodies come in lots of shapes and sizes. Everyone can get healthier by eating better and being more active. Where can you learn more? Go to http://www.woods.com/ and enter B909 to learn more about \"Learning About Healthy Weight. \"  Current as of: August 25, 2022               Content Version: 13.5  © 2006-2022 Healthwise, Incorporated. Care instructions adapted under license by [facility] ([facility]). If you have questions about a medical condition or this instruction, always ask your healthcare professional. [place] [address] any warranty or liability for your use of this information. Learning About Low-Carbohydrate Diets  What is a low-carbohydrate diet?      A low-carbohydrate (or \"low-carb\") diet limits foods and drinks that have carbohydrates. This includes grains, fruits, milk and yogurt, and starchy vegetables like potatoes, beans, and corn. It also avoids foods and drinks that have added sugar. Instead, low-carb diets include foods that are high in protein and fat. Why might you follow a low-carb diet? Low-carb diets may be used for a variety of reasons, such as for weight loss. People who have diabetes may use a low-carb diet to help manage their blood sugar levels. What should you do before you start the diet? Talk to your doctor before you try any diet. This is even more important if you have health problems like kidney disease, heart disease, or diabetes. Your doctor may suggest that you meet with a registered dietitian. A dietitian can help you make an eating plan that works for you. What foods do you eat on a low-carb diet? On a low-carb diet, you choose foods that are high in protein and fat. Examples of these are:  Meat, poultry, and fish. Eggs. Nuts, such as walnuts, pecans, almonds, and peanuts. Peanut butter and other nut butters. Tofu. Avocado. Mary Grace Em. Non-starchy vegetables like broccoli, cauliflower, green beans, mushrooms, peppers, lettuce, and spinach. Unsweetened non-dairy milks like almond milk and coconut milk. Cheese, cottage cheese, and cream cheese. Where can you learn more? Go to http://www.woods.com/ and enter C335 to learn more about \"Learning About Low-Carbohydrate Diets. \"  Current as of: May 9, 2022               Content Version: 13.5  © 2612-4832 Healthwise, Incorporated. Care instructions adapted under license by [facility] ([facility]). If you have questions about a medical condition or this instruction, always ask your healthcare professional. [place] [address] any warranty or liability for your use of this information. Personalized Preventive Plan for [facility] - 12/20/2022  Medicare offers a range of preventive health benefits.  Some of the tests and screenings are paid in full while other may be subject to a deductible, co-insurance, and/or copay. Some of these benefits include a comprehensive review of your medical history including lifestyle, illnesses that may run in your family, and various assessments and screenings as appropriate. After reviewing your medical record and screening and assessments performed today your provider may have ordered immunizations, labs, imaging, and/or referrals for you. A list of these orders (if applicable) as well as your Preventive Care list are included within your After Visit Summary for your review. Other Preventive Recommendations:    A preventive eye exam performed by an eye specialist is recommended every 1-2 years to screen for glaucoma; cataracts, macular degeneration, and other eye disorders. A preventive dental visit is recommended every 6 months. Try to get at least 150 minutes of exercise per week or 10,000 steps per day on a pedometer . Order or download the FREE \"Exercise & Physical Activity: Your Everyday Guide\" from [employer]. Call 3-407.674.1708 or search [employer] online. You need 1783-0117 mg of calcium and 3752-6577 IU of vitamin D per day. It is possible to meet your calcium requirement with diet alone, but a vitamin D supplement is usually necessary to meet this goal.  When exposed to the sun, use a sunscreen that protects against both UVA and UVB radiation with an SPF of 30 or greater. Reapply every 2 to 3 hours or after sweating, drying off with a towel, or swimming. Always wear a seat belt when traveling in a car. Always wear a helmet when riding a bicycle or motorcycle.

## 2022-12-20 NOTE — PROGRESS NOTES
co    An electronic signature was used to authenticate this note. 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  Recommended screening schedule for the next 5-10 years is provided to the patient in written form: see Patient Instructions/AVS.     Return for Medicare Annual Wellness Visit in 1 year. Subjective   The following acute and/or chronic problems were also addressed today:     Diagnosis Orders   1. Medicare annual wellness visit, subsequent  Ambulatory Referral to [facility] Clinical Specialist      2. Type 2 diabetes mellitus with hyperglycemia, without long-term current use of insulin (HCC)   Lab Results   Component Value Date    LABA1C 6.8 09/27/2022    LABA1C 5.9 06/22/2022    LABA1C 7.0 03/02/2022     Lab Results   Component Value Date    LABMICR 3.00 (H) 09/27/2022    LABMICR YES 09/27/2022    LDLCALC 104 (H) 09/27/2022    CREATININE 0.7 09/27/2022     Has been controlled, will monitor labs. Encourage continuation of diet and exercise. Microalbumin / Creatinine Urine Ratio    Hemoglobin A1C    Fructosamine    Urinalysis with Microscopic      3.  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 Hx of adenomatous colonic polyps had 9 polyps removed in 2020 and endoscopy 3 months later that was negative so is due for repeat colonoscopy and father had colon cancer, will also get EGD with complaint of dysphagia MARIN Bennett MD, Gastroenterology, [facility]      5. Snoring with Mallampati 4 on exam refer for sleep study CBC with Auto Differential    Marsha - Ranjit Escamilla MD, Sleep Medicine, [facility]      6. Former cigarette smoker quit smoking after many years will get CT early detection scan. CT Lung Screen (Initial or Annual)      7. Pure hypercholesterolemia, tolerating simvastatin well but not at LDL goal for diabetes so we will increase simvastatin from 20-40 mg nightly. Lab Results   Component Value Date    CHOL 195 09/27/2022    CHOL 159 12/13/2021    CHOL 143 05/24/2021     Lab Results   Component Value Date    TRIG 120 09/27/2022    TRIG 102 12/13/2021    TRIG 101 05/24/2021     Lab Results   Component Value Date    HDL 67 (H) 09/27/2022    HDL 56 12/13/2021    HDL 57 05/24/2021     Lab Results   Component Value Date    LDLCALC 104 (H) 09/27/2022    LDLCALC 83 12/13/2021    LDLCALC 66 05/24/2021     Lab Results   Component Value Date    LABVLDL 24 09/27/2022    LABVLDL 20 12/13/2021    LABVLDL 20 05/24/2021     Lab Results   Component Value Date    CHOLHDLRATIO 2.7 05/02/2017    CHOLHDLRATIO 2.6 11/21/2016    CHOLHDLRATIO 3.0 04/05/2016      simvastatin (ZOCOR) 40 MG tablet    T4    Vitamin B12    Comprehensive Metabolic Panel      8. Vitamin D deficiency, high risk for deficiency living in the [place] will check vitamin D level which will help her tolerate her statin Vitamin D 25 Hydroxy      9. Esophageal dysphagia off-and-on chronically and with past smoking history we will get EGD MARIN Bennett MD, Gastroenterology, [facility]            Patient's complete Health Risk Assessment and screening values have been reviewed and are found in Flowsheets.  The following problems were reviewed today and where indicated follow up appointments were made and/or referrals ordered. Positive Risk Factor Screenings with Interventions:                 Weight and Activity:  Physical Activity: Not on file                Obesity Interventions:  Patient comments: Patient is working on intermittent fasting she was told she can continue this but do this with a low-carb diet. See AVS for additional education material  See A/P for plan and any pertinent orders                               Objective   Vitals:    12/20/22 1130 12/20/22 1133 12/20/22 1138   BP: (!) 144/70 (!) 149/73 138/72   Site: Right Upper Arm Right Upper Arm Right Upper Arm   Position: Sitting Sitting Sitting   Cuff Size: Large Adult Large Adult Medium Adult   Pulse: 65 73    Temp: 97.9 °F (36.6 °C)     TempSrc: Temporal     SpO2: 96%     Weight: 222 lb (100.7 kg)        Body mass index is 36.94 kg/m². Physical Exam  Constitutional:       General: She is not in acute distress. Appearance: Normal appearance. She is not ill-appearing, toxic-appearing or diaphoretic. HENT:      Head: Normocephalic and atraumatic. Nose: Nose normal.      Mouth/Throat:      Mouth: Mucous membranes are dry. Comments: Mallampati 4  Eyes:      Extraocular Movements: Extraocular movements intact. Pupils: Pupils are equal, round, and reactive to light. Neck:      Vascular: No carotid bruit. Cardiovascular:      Pulses: Normal pulses. Heart sounds: Normal heart sounds. Pulmonary:      Effort: Pulmonary effort is normal.      Breath sounds: Normal breath sounds. Abdominal:      General: Abdomen is flat. Bowel sounds are normal. There is no distension. Palpations: Abdomen is soft. There is no mass. Musculoskeletal:         General: Normal range of motion. Cervical back: Normal range of motion and neck supple. No rigidity or tenderness. Lymphadenopathy:      Cervical: No cervical adenopathy. Skin:     General: Skin is warm and dry.       Coloration: Skin is not jaundiced or pale. Neurological:      Mental Status: She is alert. Psychiatric:         Mood and Affect: Mood normal.         Behavior: Behavior normal.         Thought Content: Thought content normal.         Judgment: Judgment normal.            Allergies   Allergen Reactions    Lisinopril Swelling    Aspirin Other (See Comments)     Ulcers    Lipitor      CPK     Prior to Visit Medications    Medication Sig Taking?  Authorizing Provider   amLODIPine (NORVASC) 10 MG tablet TAKE ONE TABLET BY MOUTH DAILY Yes Joan Daniels MD   metFORMIN (GLUCOPHAGE) 500 MG tablet Take 1 tablet by mouth daily (with breakfast) Yes Joan Daniels MD   levothyroxine (SYNTHROID) 25 MCG tablet Take 1 tablet by mouth daily Yes Luigi Tam MD   glipiZIDE (GLUCOTROL) 5 MG tablet Take 1 tablet by mouth Daily with supper Yes Luigi Tam MD   simvastatin (ZOCOR) 20 MG tablet TAKE ONE TABLET BY MOUTH DAILY Yes Luigi Tam MD   [facility] ULTRA strip USE ONE STRIP TO TEST TWICE A DAY Yes Luigi Tam MD   Smoked for 30 years quit 3/18/1984 smoked 2 ppd    CareTeam (Including outside providers/suppliers regularly involved in providing care):   Patient Care Team:  Joan Daniels MD as PCP - General (Internal Medicine)  Joan Daniels MD as PCP - [facility] Empaneled Provider     Reviewed and updated this visit:  Tobacco  Allergies  Meds  Problems  Med Hx  Surg Hx  Soc Hx  Fam Hx

## 2022-12-21 ENCOUNTER — CLINICAL DOCUMENTATION (OUTPATIENT)
Dept: SPIRITUAL SERVICES | Age: 77
End: 2022-12-21

## 2022-12-21 NOTE — ACP (ADVANCE CARE PLANNING)
Advance Care Planning   Ambulatory ACP Specialist Patient Outreach    Date:  12/21/2022  ACP Specialist:  Pratima Armenta    Outreach call to patient in follow-up to ACP Specialist referral from: Sanjuana Camarena MD    [x] PCP  [] Provider   [] Ambulatory Care Management [] Other for Reason:    [x] Advance Directive Assistance  [] Code Status Discussion  [] Complete Portable DNR Order  [] Discuss Goals of Care  [] Complete POST/MOST  [] Early ACP Decision-Making  [] Other    Date Referral Received: 12/20/22    Today's Outreach:  [x] First   [] Second  [] Third                               First outreach made by [x]  phone  [] email []   [employer]     Intervention:  [] Spoke with Patient  [x] Left VM requesting return call      Outcome: Left detailed VM for Patient to return call. Next Step:   [] ACP scheduled conversation  [x] Outreach again in one week               [] Email / Mail ACP Info Sheets  [] Email / Mail Advance Directive            [] Close Referral. Routing closure to referring provider/staff and to ACP Specialist . [] Closure Letter mailed to Patient with Invitation to Contact ACP Specialist if/when ready.     Thank you for this referral.

## 2022-12-27 ENCOUNTER — HOSPITAL ENCOUNTER (OUTPATIENT)
Age: 77
Discharge: HOME | End: 2022-12-27
Payer: MEDICARE

## 2022-12-27 DIAGNOSIS — E78.00 PURE HYPERCHOLESTEROLEMIA: ICD-10-CM

## 2022-12-27 DIAGNOSIS — R06.83 SNORING: ICD-10-CM

## 2022-12-27 DIAGNOSIS — E55.9 VITAMIN D DEFICIENCY: ICD-10-CM

## 2022-12-27 DIAGNOSIS — E11.65 TYPE 2 DIABETES MELLITUS WITH HYPERGLYCEMIA, WITHOUT LONG-TERM CURRENT USE OF INSULIN (HCC): ICD-10-CM

## 2022-12-27 LAB
A/G RATIO: 1.4 (ref 1.1–2.2)
ALBUMIN SERPL-MCNC: 4.2 G/DL (ref 3.4–5)
ALP BLD-CCNC: 100 U/L (ref 40–129)
ALT SERPL-CCNC: 12 U/L (ref 10–40)
ANION GAP SERPL CALCULATED.3IONS-SCNC: 19 MMOL/L (ref 3–16)
AST SERPL-CCNC: 16 U/L (ref 15–37)
BACTERIA: ABNORMAL /HPF
BASOPHILS ABSOLUTE: 0.1 K/UL (ref 0–0.2)
BASOPHILS RELATIVE PERCENT: 1.3 %
BILIRUB SERPL-MCNC: 0.4 MG/DL (ref 0–1)
BILIRUBIN URINE: NEGATIVE
BLOOD, URINE: NEGATIVE
BUN BLDV-MCNC: 12 MG/DL (ref 7–20)
CALCIUM SERPL-MCNC: 10 MG/DL (ref 8.3–10.6)
CHLORIDE BLD-SCNC: 104 MMOL/L (ref 99–110)
CLARITY: CLEAR
CO2: 18 MMOL/L (ref 21–32)
COLOR: YELLOW
CREAT SERPL-MCNC: 0.8 MG/DL (ref 0.6–1.2)
CREATININE URINE: 114.4 MG/DL (ref 28–259)
EOSINOPHILS ABSOLUTE: 0.1 K/UL (ref 0–0.6)
EOSINOPHILS RELATIVE PERCENT: 1.1 %
EPITHELIAL CELLS, UA: 1 /HPF (ref 0–5)
GFR SERPL CREATININE-BSD FRML MDRD: >60 ML/MIN/{1.73_M2}
GLUCOSE BLD-MCNC: 99 MG/DL (ref 70–99)
GLUCOSE URINE: NEGATIVE MG/DL
HCT VFR BLD CALC: 40.3 % (ref 36–48)
HEMOGLOBIN: 12.9 G/DL (ref 12–16)
HYALINE CASTS: 0 /LPF (ref 0–8)
KETONES, URINE: NEGATIVE MG/DL
LEUKOCYTE ESTERASE, URINE: NEGATIVE
LYMPHOCYTES ABSOLUTE: 1.6 K/UL (ref 1–5.1)
LYMPHOCYTES RELATIVE PERCENT: 19.5 %
MCH RBC QN AUTO: 29 PG (ref 26–34)
MCHC RBC AUTO-ENTMCNC: 32.1 G/DL (ref 31–36)
MCV RBC AUTO: 90.4 FL (ref 80–100)
MICROALBUMIN UR-MCNC: 3.2 MG/DL
MICROALBUMIN/CREAT UR-RTO: 28 MG/G (ref 0–30)
MICROSCOPIC EXAMINATION: YES
MONOCYTES ABSOLUTE: 0.6 K/UL (ref 0–1.3)
MONOCYTES RELATIVE PERCENT: 7.2 %
NEUTROPHILS ABSOLUTE: 5.7 K/UL (ref 1.7–7.7)
NEUTROPHILS RELATIVE PERCENT: 70.9 %
NITRITE, URINE: NEGATIVE
PDW BLD-RTO: 15.2 % (ref 12.4–15.4)
PH UA: 6 (ref 5–8)
PLATELET # BLD: 246 K/UL (ref 135–450)
PMV BLD AUTO: 9.3 FL (ref 5–10.5)
POTASSIUM SERPL-SCNC: 4.2 MMOL/L (ref 3.5–5.1)
PROTEIN UA: ABNORMAL MG/DL
RBC # BLD: 4.46 M/UL (ref 4–5.2)
RBC UA: 1 /HPF (ref 0–4)
SODIUM BLD-SCNC: 141 MMOL/L (ref 136–145)
SPECIFIC GRAVITY UA: 1.01 (ref 1–1.03)
T4 TOTAL: 7.7 UG/DL (ref 4.5–10.9)
TOTAL PROTEIN: 7.3 G/DL (ref 6.4–8.2)
URINE TYPE: ABNORMAL
UROBILINOGEN, URINE: 0.2 E.U./DL
VITAMIN B-12: 296 PG/ML (ref 211–911)
VITAMIN D 25-HYDROXY: 23.7 NG/ML
WBC # BLD: 8 K/UL (ref 4–11)
WBC UA: 1 /HPF (ref 0–5)

## 2022-12-27 PROCEDURE — 82306 VITAMIN D 25 HYDROXY: CPT

## 2022-12-27 PROCEDURE — 84436 ASSAY OF TOTAL THYROXINE: CPT

## 2022-12-27 PROCEDURE — 83036 HEMOGLOBIN GLYCOSYLATED A1C: CPT

## 2022-12-27 PROCEDURE — 82043 UR ALBUMIN QUANTITATIVE: CPT

## 2022-12-27 PROCEDURE — 36415 COLL VENOUS BLD VENIPUNCTURE: CPT

## 2022-12-27 PROCEDURE — 82570 ASSAY OF URINE CREATININE: CPT

## 2022-12-27 PROCEDURE — 81001 URINALYSIS AUTO W/SCOPE: CPT

## 2022-12-27 PROCEDURE — 82607 VITAMIN B-12: CPT

## 2022-12-27 PROCEDURE — 85025 COMPLETE CBC W/AUTO DIFF WBC: CPT

## 2022-12-27 PROCEDURE — 80053 COMPREHEN METABOLIC PANEL: CPT

## 2022-12-28 DIAGNOSIS — E55.9 VITAMIN D DEFICIENCY: Primary | ICD-10-CM

## 2022-12-28 PROBLEM — R80.9 MICROALBUMINURIA DUE TO TYPE 2 DIABETES MELLITUS (HCC): Status: ACTIVE | Noted: 2022-12-28

## 2022-12-28 PROBLEM — E11.29 MICROALBUMINURIA DUE TO TYPE 2 DIABETES MELLITUS (HCC): Status: ACTIVE | Noted: 2022-12-28

## 2022-12-28 LAB
ESTIMATED AVERAGE GLUCOSE: 157.1 MG/DL
HBA1C MFR BLD: 7.1 %

## 2022-12-28 RX ORDER — MELATONIN
1000 DAILY
Qty: 30 TABLET | Refills: 5 | Status: SHIPPED | OUTPATIENT
Start: 2022-12-28

## 2022-12-28 NOTE — RESULT ENCOUNTER NOTE
Normal thyroid blood test.  Normal kidney function test.  Mild metabolic acidosis, with mild anion gap. Normal kidney function test.    Your vitamin D level is low. Normally the vitamin D level should be above 30. A level of 50 is ideal.  Vitamin D is important for healthy bones and a healthy immune system. Low vitamin D levels making you more prone to getting viral infections. Also a chronically low vitamin D level is a risk factor for breast and colon cancer. Vitamin D deficiency. Supplement sent to pharmacy. Low normal vitamin B12 level. Take a multivitamin daily that will contain B12.

## 2022-12-29 RX ORDER — PIOGLITAZONEHYDROCHLORIDE 15 MG/1
15 TABLET ORAL DAILY
Qty: 30 TABLET | Refills: 5 | Status: SHIPPED | OUTPATIENT
Start: 2022-12-29

## 2023-01-03 ENCOUNTER — CLINICAL DOCUMENTATION (OUTPATIENT)
Dept: SPIRITUAL SERVICES | Age: 78
End: 2023-01-03

## 2023-01-03 NOTE — ACP (ADVANCE CARE PLANNING)
Advance Care Planning   Ambulatory ACP Specialist Patient Outreach    Date:  1/3/2023  ACP Specialist:  Brandon Melchor    Outreach call to patient in follow-up to ACP Specialist referral from: Ainsley Leal MD    [x] PCP  [] Provider   [] Ambulatory Care Management [] Other for Reason:    [x] Advance Directive Assistance  [] Code Status Discussion  [] Complete Portable DNR Order  [] Discuss Goals of Care  [] Complete POST/MOST  [] Early ACP Decision-Making  [] Other    Date Referral Received:12/20/22    Today's Outreach:  [] First   [x] Second  [] Third                               Second outreach made by [x]  phone  [x] email []   [employer]     Intervention:  [] Spoke with Patient  [x] Left VM requesting return call      Outcome: Left detailed VM for Patient to return call. Sent Email w/ACP Packet included. Next Step:   [] ACP scheduled conversation  [x] Outreach again in one week               [x] Email / Mail ACP Info Sheets  [x] Email / Mail Advance Directive            [] Close Referral. Routing closure to referring provider/staff and to ACP Specialist . [] Closure Letter mailed to Patient with Invitation to Contact ACP Specialist if/when ready.     Thank you for this referral.

## 2023-01-09 ENCOUNTER — TELEPHONE (OUTPATIENT)
Dept: CASE MANAGEMENT | Age: 78
End: 2023-01-09

## 2023-01-09 NOTE — TELEPHONE ENCOUNTER
Dr Sriram Weber-  Patient is scheduled for a lung screen on 1/16/23. She does not meet the criteria for a lung screen as she quit smoking more than 15 years ago (1986). If you would still like the patient to receive a CT scan- please place a new order for an LDCT ([facility] [id number]) and I will get it changed in the system.   Thank you,  Ava Estrada, RN  Lung Navigator  [address], [address] [place] [address]  [phone]

## 2023-01-10 ENCOUNTER — TELEPHONE (OUTPATIENT)
Dept: CASE MANAGEMENT | Age: 78
End: 2023-01-10

## 2023-01-10 ENCOUNTER — CLINICAL DOCUMENTATION (OUTPATIENT)
Dept: SPIRITUAL SERVICES | Age: 78
End: 2023-01-10

## 2023-01-10 DIAGNOSIS — R13.10 DYSPHAGIA, UNSPECIFIED TYPE: Primary | ICD-10-CM

## 2023-01-10 NOTE — ACP (ADVANCE CARE PLANNING)
Advance Care Planning   Ambulatory ACP Specialist Patient Outreach    Date:  1/10/2023  ACP Specialist:  Heladio Gonzalez    Outreach call to patient in follow-up to ACP Specialist referral from: Jenny Bailey MD    [x] PCP  [] Provider   [] Ambulatory Care Management [] Other for Reason:    [x] Advance Directive Assistance  [] Code Status Discussion  [] Complete Portable DNR Order  [] Discuss Goals of Care  [] Complete POST/MOST  [] Early ACP Decision-Making  [] Other    Date Referral Received:12/20/22    Today's Outreach:  [] First   [] Second  [x] Third                               Third outreach made by []  phone  [x] email [x]   [employer]     Intervention:  [] Spoke with Patient  [] Left VM requesting return call      Outcome: Third Outreach. Sent Closure Letter via Email w/ACP Packet included. Submittable. Next Step:   [] ACP scheduled conversation  [] Outreach again in one week               [x] Email / Mail ACP Info Sheets  [x] Email / Mail Advance Directive            [x] Close Referral. Routing closure to referring provider/staff and to ACP Specialist . [x] Closure Letter mailed to Patient with Invitation to Contact ACP Specialist if/when ready.     Thank you for this referral.

## 2023-01-10 NOTE — TELEPHONE ENCOUNTER
Call to [facility] with Lemuel Mckeon. She cancelled the lung screen and put the order in for a CT Chest as pt did not meet criteria for a lung screen.

## 2023-01-10 NOTE — TELEPHONE ENCOUNTER
Alcon Edwards called In stating she still doesn't see the order put in for CT chest. Please place order as soon as possible please.

## 2023-01-16 ENCOUNTER — HOSPITAL ENCOUNTER (OUTPATIENT)
Dept: CT IMAGING | Age: 78
Discharge: HOME | End: 2023-01-16
Payer: MEDICARE

## 2023-01-16 DIAGNOSIS — Z87.891 FORMER CIGARETTE SMOKER: ICD-10-CM

## 2023-01-16 DIAGNOSIS — R13.10 DYSPHAGIA, UNSPECIFIED TYPE: ICD-10-CM

## 2023-01-16 PROCEDURE — 71250 CT THORAX DX C-: CPT

## 2023-01-19 DIAGNOSIS — R59.0 ABDOMINAL LYMPHADENOPATHY: Primary | ICD-10-CM

## 2023-01-19 PROBLEM — J43.8 OTHER EMPHYSEMA (HCC): Status: ACTIVE | Noted: 2023-01-19

## 2023-01-19 NOTE — RESULT ENCOUNTER NOTE
The CT scan showed an area that is either an enlarged lymph node or a blood vessel. In order to make sure it is not an enlarged lymph node, it is recommended that we repeat the CT scan with IV contrast.  I will place the order so you can schedule. Call [phone] to arrange. Drink plenty of water for 2 days before and 2 days after the test.  This is to clear the intravenous dye out of your system.

## 2023-01-19 NOTE — RESULT ENCOUNTER NOTE
The CT scan showed an area that is either an enlarged lymph node or a blood vessel. In order to make sure it is not an enlarged lymph node, it is recommended that we repeat the CT scan with IV contrast.  I will place the order so you can schedule. Call [phone] to arrange. Drink plenty of water for 2 days before and 2 days after the test.  This is to clear the intravenous dye out of your system.

## 2023-02-06 ENCOUNTER — HOSPITAL ENCOUNTER (OUTPATIENT)
Dept: CT IMAGING | Age: 78
Discharge: HOME | End: 2023-02-06
Payer: MEDICARE

## 2023-02-06 DIAGNOSIS — E78.2 MIXED HYPERLIPIDEMIA: ICD-10-CM

## 2023-02-06 DIAGNOSIS — I70.8 ATHEROSCLEROSIS OF BOTH ILIAC ARTERIES: ICD-10-CM

## 2023-02-06 DIAGNOSIS — I70.0 AORTIC ATHEROSCLEROSIS (HCC): ICD-10-CM

## 2023-02-06 DIAGNOSIS — R59.0 ABDOMINAL LYMPHADENOPATHY: ICD-10-CM

## 2023-02-06 DIAGNOSIS — E55.9 VITAMIN D DEFICIENCY: Primary | ICD-10-CM

## 2023-02-06 PROCEDURE — 74177 CT ABD & PELVIS W/CONTRAST: CPT

## 2023-02-06 PROCEDURE — 6360000004 HC RX CONTRAST MEDICATION: Performed by: INTERNAL MEDICINE

## 2023-02-06 RX ADMIN — IOPAMIDOL 75 ML: 755 INJECTION, SOLUTION INTRAVENOUS at 08:15

## 2023-02-06 RX ADMIN — DIATRIZOATE MEGLUMINE AND DIATRIZOATE SODIUM 20 ML: 660; 100 LIQUID ORAL; RECTAL at 08:15

## 2023-02-06 NOTE — RESULT ENCOUNTER NOTE
Patient called and given results of CT scan. Pulmonary nodules stable no further follow-up. No significant lymph adenopathy in the retroperitoneum. This had resolved on the current scan. Also pointed out aortic atherosclerosis. Important keep LDL less than 70. Recently simvastatin increased to 40 mg daily the patient will get fasting lipid and is taking vitamin D supplements and will get follow-up vitamin D level orders placed.

## 2023-02-13 ENCOUNTER — HOSPITAL ENCOUNTER (OUTPATIENT)
Age: 78
Discharge: HOME | End: 2023-02-13
Payer: MEDICARE

## 2023-02-13 DIAGNOSIS — E03.9 HYPOTHYROIDISM, UNSPECIFIED TYPE: ICD-10-CM

## 2023-02-13 DIAGNOSIS — E55.9 VITAMIN D DEFICIENCY: ICD-10-CM

## 2023-02-13 DIAGNOSIS — E78.2 MIXED HYPERLIPIDEMIA: ICD-10-CM

## 2023-02-13 LAB
CHOLESTEROL, TOTAL: 184 MG/DL (ref 0–199)
CREATININE URINE: 132.2 MG/DL (ref 28–259)
HDLC SERPL-MCNC: 80 MG/DL (ref 40–60)
LDL CHOLESTEROL CALCULATED: 91 MG/DL
MICROALBUMIN UR-MCNC: <1.2 MG/DL
MICROALBUMIN/CREAT UR-RTO: NORMAL MG/G (ref 0–30)
TRIGL SERPL-MCNC: 64 MG/DL (ref 0–150)
VITAMIN D 25-HYDROXY: 30.9 NG/ML
VLDLC SERPL CALC-MCNC: 13 MG/DL

## 2023-02-13 PROCEDURE — 82306 VITAMIN D 25 HYDROXY: CPT

## 2023-02-13 PROCEDURE — 80061 LIPID PANEL: CPT

## 2023-02-13 PROCEDURE — 36415 COLL VENOUS BLD VENIPUNCTURE: CPT

## 2023-02-13 PROCEDURE — 82570 ASSAY OF URINE CREATININE: CPT

## 2023-02-13 PROCEDURE — 82043 UR ALBUMIN QUANTITATIVE: CPT

## 2023-02-22 RX ORDER — BLOOD SUGAR DIAGNOSTIC
STRIP MISCELLANEOUS
Qty: 100 STRIP | Refills: 12 | Status: SHIPPED | OUTPATIENT
Start: 2023-02-22

## 2023-03-08 PROBLEM — I10 HYPERTENSION, ESSENTIAL: Status: ACTIVE | Noted: 2023-03-08

## 2023-03-08 PROBLEM — I10 HYPERTENSION, ESSENTIAL: Chronic | Status: ACTIVE | Noted: 2023-03-08

## 2023-03-08 PROBLEM — E66.01 SEVERE OBESITY (BMI 35.0-39.9) WITH COMORBIDITY (HCC): Chronic | Status: ACTIVE | Noted: 2018-09-20

## 2023-03-08 RX ORDER — LEVOTHYROXINE SODIUM 0.03 MG/1
25 TABLET ORAL DAILY
Qty: 80 TABLET | Refills: 1 | Status: SHIPPED | OUTPATIENT
Start: 2023-03-08

## 2023-04-18 ENCOUNTER — TELEPHONE (OUTPATIENT)
Dept: PULMONOLOGY | Age: 78
End: 2023-04-18

## 2023-04-18 NOTE — TELEPHONE ENCOUNTER
Spoke with pt to review HST results. Order to be sent to [facility] due to location. Pt to schedule f/u.

## 2023-04-18 NOTE — PROGRESS NOTES
Scott Tracey         : 1945  [facility]    Diagnosis: [x] SCOTTIE (G47.33) [] CSA (G47.31) [] Apnea (G47.30)   Length of Need: [x] 18 Months [] 99 Months [] Other:    Machine (ALFRED!): [] Respironics Dream Station   2   Auto [x] ResMed AirSense     Auto S11 [] Other:     [x]  CPAP () [] Bilevel ()   Mode: [x] Auto [] Spontaneous    Mode: [] Auto [] Spontaneous      P min 6 cmH2O  P max 16 cmH2O      Comfort Settings:   - Ramp Pressure: 4 cmH2O                                        - Ramp time: 15 min                                     -  Flex/EPR - 3 full time                                    - For ResMed Bilevel (TiMax-4 sec   TiMin- 0.2 sec)     Humidifier: [x] Heated ()        [x] Water chamber replacement ()/ 1 per 6 months        Mask:   [] Nasal () /1 per 3 months [x] Full Face () /1 per 3 months   [] Patient choice -Size and fit mask [x] Patient Choice - Size and fit mask   [] Dispense:  [] Dispense:    [] Headgear () / 1 per 3 months [x] Headgear () / 1 per 3 months   [] Replacement Nasal Cushion ()/2 per month [x] Interface Replacement ()/1 per month   [] Replacement Nasal Pillows ()/2 per month         Tubing: [x] Heated ()/1 per 3 months    [] Standard ()/1 per 3 months [] Other:           Filters: [x] Non-disposable ()/1 per 6 months     [x] Ultra-Fine, Disposable ()/2 per month        Miscellaneous: [] Chin Strap ()/ 1 per 6 months [] O2 bleed-in:       LPM   [] Oximetry on CPAP/Bilevel []  Other:    [x] Modem: ()         Start Order Date: 23    MEDICAL JUSTIFICATION:  I, the undersigned, certify that the above prescribed supplies are medically necessary for this patients wellbeing. In my opinion, the supplies are both reasonable and necessary in reference to accepted standards of medicalpractice in treatment of this patients condition.     Raymundo Schrader MD      NPI: [id number]       Order Signed Date:

## 2023-04-25 ENCOUNTER — TELEPHONE (OUTPATIENT)
Dept: PRIMARY CARE CLINIC | Age: 78
End: 2023-04-25

## 2023-04-25 DIAGNOSIS — U07.1 COVID-19 VIRUS INFECTION: Primary | ICD-10-CM

## 2023-04-25 NOTE — PROGRESS NOTES
Tested positive for covid today with 2 days of cough, myalgias and chills. Pulse ox 98 and pulse 79 temp 100.4.   eating and drinking. Normal GFR. Immunization History   Administered Date(s) Administered    COVID-19, MODERNA BLUE border, Primary or Immunocompromised, (age 12y+), IM, 100 mcg/0.5mL 01/30/2021, 02/27/2021, 11/22/2021    Influenza, FLUZONE (age 72 y+), High Dose, 0.7mL 09/29/2020, 10/20/2021    Influenza, High Dose (Fluzone 65 yrs and older) 11/07/2016, 10/27/2017, 10/16/2018, 09/25/2019, 10/02/2020    Pneumococcal, PCV-13, PREVNAR 15, (age 6w+), IM, 0.5mL 04/20/2016    Pneumococcal, PPSV23, PNEUMOVAX 23, (age 2y+), SC/IM, 0.5mL 02/11/2008, 08/20/2013    Td, unspecified formulation 11/01/2010    Zoster Live (Zostavax) 09/10/2013    Zoster Recombinant (Shingrix) 09/25/2019, 02/10/2020     [facility] pharmacy does have Paxlovid in stock and should be ready an hour and a half. Patient notified. She will update us on status. She will go to ER if not able to eat or drink or shortness of breath or pulse ox drops to 90.

## 2023-05-08 DIAGNOSIS — R30.0 DYSURIA: Primary | ICD-10-CM

## 2023-05-10 DIAGNOSIS — E55.9 VITAMIN D DEFICIENCY: ICD-10-CM

## 2023-05-10 RX ORDER — PIOGLITAZONEHYDROCHLORIDE 15 MG/1
TABLET ORAL
Qty: 30 TABLET | Refills: 5 | Status: SHIPPED | OUTPATIENT
Start: 2023-05-10

## 2023-05-10 RX ORDER — LEVOTHYROXINE SODIUM 0.03 MG/1
TABLET ORAL
Qty: 80 TABLET | Refills: 1 | Status: SHIPPED | OUTPATIENT
Start: 2023-05-10

## 2023-05-10 RX ORDER — MULTIVIT-MIN/IRON/FOLIC ACID/K 18-600-40
CAPSULE ORAL
Qty: 30 TABLET | Refills: 5 | Status: SHIPPED | OUTPATIENT
Start: 2023-05-10

## 2023-05-10 NOTE — TELEPHONE ENCOUNTER
Future Appointments    Encounter Information    Provider Department Appt Notes   5/17/2023 [facility] [address] DAUGHTER/SCREEN/SACHIN   7/24/2023 Nancy Mike MD [facility] Sleep Medicine IO [facility]     Past Visits    Date Provider Specialty Visit Type Primary Dx   03/08/2023 Nancy Mike MD Sleep Medicine Telemedicine Hypersomnia   12/20/2022 Linda Garcia MD Primary Care Office Visit Medicare annual wellness visit, subsequent

## 2023-05-17 ENCOUNTER — HOSPITAL ENCOUNTER (OUTPATIENT)
Dept: WOMENS IMAGING | Age: 78
Discharge: HOME | End: 2023-05-17
Payer: MEDICARE

## 2023-05-17 VITALS — HEIGHT: 65 IN | BODY MASS INDEX: 36.94 KG/M2

## 2023-05-17 DIAGNOSIS — Z12.31 VISIT FOR SCREENING MAMMOGRAM: ICD-10-CM

## 2023-05-17 PROCEDURE — 77063 BREAST TOMOSYNTHESIS BI: CPT

## 2023-05-30 ENCOUNTER — TELEPHONE (OUTPATIENT)
Dept: PRIMARY CARE CLINIC | Age: 78
End: 2023-05-30

## 2023-05-30 DIAGNOSIS — R30.0 DYSURIA: ICD-10-CM

## 2023-05-30 DIAGNOSIS — N30.90 CYSTITIS: Primary | ICD-10-CM

## 2023-05-30 LAB
BACTERIA URNS QL MICRO: ABNORMAL /HPF
BILIRUB UR QL STRIP.AUTO: NEGATIVE
CLARITY UR: CLEAR
COLOR UR: YELLOW
EPI CELLS #/AREA URNS AUTO: 5 /HPF (ref 0–5)
GLUCOSE UR STRIP.AUTO-MCNC: NEGATIVE MG/DL
HGB UR QL STRIP.AUTO: NEGATIVE
HYALINE CASTS #/AREA URNS AUTO: 0 /LPF (ref 0–8)
KETONES UR STRIP.AUTO-MCNC: NEGATIVE MG/DL
LEUKOCYTE ESTERASE UR QL STRIP.AUTO: ABNORMAL
NITRITE UR QL STRIP.AUTO: POSITIVE
PH UR STRIP.AUTO: 6 [PH] (ref 5–8)
PROT UR STRIP.AUTO-MCNC: ABNORMAL MG/DL
RBC CLUMPS #/AREA URNS AUTO: 1 /HPF (ref 0–4)
SP GR UR STRIP.AUTO: 1.02 (ref 1–1.03)
UA DIPSTICK W REFLEX MICRO PNL UR: YES
URN SPEC COLLECT METH UR: ABNORMAL
UROBILINOGEN UR STRIP-ACNC: 0.2 E.U./DL
WBC #/AREA URNS AUTO: 22 /HPF (ref 0–5)

## 2023-05-30 RX ORDER — SULFAMETHOXAZOLE AND TRIMETHOPRIM 400; 80 MG/1; MG/1
1 TABLET ORAL 2 TIMES DAILY
Qty: 20 TABLET | Refills: 0 | Status: SHIPPED | OUTPATIENT
Start: 2023-05-30 | End: 2023-06-09

## 2023-06-01 LAB
BACTERIA UR CULT: ABNORMAL
ORGANISM: ABNORMAL

## 2023-06-19 ENCOUNTER — TELEPHONE (OUTPATIENT)
Dept: PRIMARY CARE CLINIC | Age: 78
End: 2023-06-19

## 2023-06-19 DIAGNOSIS — R30.0 DYSURIA: Primary | ICD-10-CM

## 2023-06-19 DIAGNOSIS — R35.0 URINARY FREQUENCY: ICD-10-CM

## 2023-06-19 NOTE — TELEPHONE ENCOUNTER
Pt called in stating that she was recently prescribed an antibiotic for a UTI. PT says that she still has the urgency to go even after finishing the course of antibiotics so PT would like to know if she can come in and give another sample to see if she still has a UTI.      Please call back to adv: [phone]

## 2023-06-20 DIAGNOSIS — R35.0 URINARY FREQUENCY: ICD-10-CM

## 2023-06-20 DIAGNOSIS — R30.0 DYSURIA: ICD-10-CM

## 2023-06-20 LAB
BACTERIA URNS QL MICRO: ABNORMAL /HPF
BILIRUB UR QL STRIP.AUTO: NEGATIVE
CLARITY UR: CLEAR
COLOR UR: YELLOW
EPI CELLS #/AREA URNS AUTO: 1 /HPF (ref 0–5)
GLUCOSE UR STRIP.AUTO-MCNC: NEGATIVE MG/DL
HGB UR QL STRIP.AUTO: NEGATIVE
HYALINE CASTS #/AREA URNS AUTO: 0 /LPF (ref 0–8)
KETONES UR STRIP.AUTO-MCNC: NEGATIVE MG/DL
LEUKOCYTE ESTERASE UR QL STRIP.AUTO: ABNORMAL
NITRITE UR QL STRIP.AUTO: NEGATIVE
PH UR STRIP.AUTO: 6.5 [PH] (ref 5–8)
PROT UR STRIP.AUTO-MCNC: NEGATIVE MG/DL
RBC CLUMPS #/AREA URNS AUTO: 0 /HPF (ref 0–4)
SP GR UR STRIP.AUTO: 1.01 (ref 1–1.03)
UA DIPSTICK W REFLEX MICRO PNL UR: YES
URN SPEC COLLECT METH UR: ABNORMAL
UROBILINOGEN UR STRIP-ACNC: 0.2 E.U./DL
WBC #/AREA URNS AUTO: 2 /HPF (ref 0–5)

## 2023-06-21 LAB
ALBUMIN SERPL-MCNC: 5 G/DL (ref 3.4–5)
ANION GAP SERPL CALCULATED.3IONS-SCNC: 20 MMOL/L (ref 3–16)
BUN SERPL-MCNC: 8 MG/DL (ref 7–20)
CALCIUM SERPL-MCNC: 10.1 MG/DL (ref 8.3–10.6)
CHLORIDE SERPL-SCNC: 102 MMOL/L (ref 99–110)
CO2 SERPL-SCNC: 19 MMOL/L (ref 21–32)
CREAT SERPL-MCNC: 0.8 MG/DL (ref 0.6–1.2)
GFR SERPLBLD CREATININE-BSD FMLA CKD-EPI: >60 ML/MIN/{1.73_M2}
GLUCOSE SERPL-MCNC: 115 MG/DL (ref 70–99)
PHOSPHATE SERPL-MCNC: 2.6 MG/DL (ref 2.5–4.9)
POTASSIUM SERPL-SCNC: 4 MMOL/L (ref 3.5–5.1)
SODIUM SERPL-SCNC: 141 MMOL/L (ref 136–145)

## 2023-06-22 LAB
BACTERIA UR CULT: ABNORMAL
ORGANISM: ABNORMAL

## 2023-06-23 DIAGNOSIS — E78.00 PURE HYPERCHOLESTEROLEMIA: ICD-10-CM

## 2023-06-23 RX ORDER — SIMVASTATIN 40 MG
TABLET ORAL
Qty: 90 TABLET | Refills: 1 | Status: SHIPPED | OUTPATIENT
Start: 2023-06-23

## 2023-06-23 NOTE — TELEPHONE ENCOUNTER
Future Appointments    Encounter Information    Provider Department Appt Notes   7/24/2023 Oralia Redding MD [facility] Sleep Medicine IO [facility]     Past Visits    Date Provider Specialty Visit Type Primary Dx   03/08/2023 Oralia Redding MD Sleep Medicine Telemedicine Hypersomnia   12/20/2022 Selinda Landau, MD Primary Care Office Visit Medicare annual wellness visit, subsequent

## 2023-06-24 DIAGNOSIS — E87.20 METABOLIC ACIDOSIS: Primary | ICD-10-CM

## 2023-06-24 NOTE — RESULT ENCOUNTER NOTE
I sent the you to the nephrologist to evaluate the mild metabolic acidosis. Call the number below for an appointment. The kidney function test are normal.  The blood sugar was 115 so come in for A1c to rule out prediabetes.     [facility], [facility], [address] [place] [address]., [address] [place] [address], [address]  Phone: [phone]

## 2023-06-24 NOTE — RESULT ENCOUNTER NOTE
Normal kidney blood test.  There is a metabolic acidosis. Comes to the lab to get a urinalysis for urine pH. None of your medications will cause a metabolic acidosis. I am sending you to the nephrologist to further evaluate this.     [facility], [facility], [address] [place] [address]., [address] [place] [address], [address]  Phone: [phone]

## 2023-06-27 ENCOUNTER — TELEPHONE (OUTPATIENT)
Dept: PULMONOLOGY | Age: 78
End: 2023-06-27

## 2023-07-03 ENCOUNTER — APPOINTMENT (OUTPATIENT)
Dept: CT IMAGING | Age: 78
End: 2023-07-03
Payer: MEDICARE

## 2023-07-03 ENCOUNTER — TELEPHONE (OUTPATIENT)
Dept: PRIMARY CARE CLINIC | Age: 78
End: 2023-07-03

## 2023-07-03 ENCOUNTER — APPOINTMENT (OUTPATIENT)
Dept: GENERAL RADIOLOGY | Age: 78
End: 2023-07-03
Payer: MEDICARE

## 2023-07-03 ENCOUNTER — HOSPITAL ENCOUNTER (EMERGENCY)
Age: 78
Discharge: HOME | End: 2023-07-03
Attending: EMERGENCY MEDICINE
Payer: MEDICARE

## 2023-07-03 VITALS
HEART RATE: 67 BPM | DIASTOLIC BLOOD PRESSURE: 64 MMHG | BODY MASS INDEX: 36.94 KG/M2 | OXYGEN SATURATION: 95 % | TEMPERATURE: 98.4 F | HEIGHT: 65 IN | RESPIRATION RATE: 14 BRPM | SYSTOLIC BLOOD PRESSURE: 140 MMHG

## 2023-07-03 DIAGNOSIS — R06.02 SHORTNESS OF BREATH: Primary | ICD-10-CM

## 2023-07-03 LAB
ALBUMIN SERPL-MCNC: 5.1 G/DL (ref 3.4–5)
ALBUMIN/GLOB SERPL: 1.4 {RATIO} (ref 1.1–2.2)
ALP SERPL-CCNC: 105 U/L (ref 40–129)
ALT SERPL-CCNC: 11 U/L (ref 10–40)
ANION GAP SERPL CALCULATED.3IONS-SCNC: 17 MMOL/L (ref 3–16)
AST SERPL-CCNC: 18 U/L (ref 15–37)
BASOPHILS # BLD: 0.1 K/UL (ref 0–0.2)
BASOPHILS NFR BLD: 1 %
BILIRUB SERPL-MCNC: 0.4 MG/DL (ref 0–1)
BUN SERPL-MCNC: 14 MG/DL (ref 7–20)
CALCIUM SERPL-MCNC: 10.4 MG/DL (ref 8.3–10.6)
CHLORIDE SERPL-SCNC: 103 MMOL/L (ref 99–110)
CO2 SERPL-SCNC: 20 MMOL/L (ref 21–32)
CREAT SERPL-MCNC: 0.8 MG/DL (ref 0.6–1.2)
D DIMER: 0.78 UG/ML FEU (ref 0–0.6)
DEPRECATED RDW RBC AUTO: 15.4 % (ref 12.4–15.4)
EOSINOPHIL # BLD: 0.1 K/UL (ref 0–0.6)
EOSINOPHIL NFR BLD: 1 %
GFR SERPLBLD CREATININE-BSD FMLA CKD-EPI: >60 ML/MIN/{1.73_M2}
GLUCOSE SERPL-MCNC: 91 MG/DL (ref 70–99)
HCT VFR BLD AUTO: 41.6 % (ref 36–48)
HGB BLD-MCNC: 13.9 G/DL (ref 12–16)
LYMPHOCYTES # BLD: 1.3 K/UL (ref 1–5.1)
LYMPHOCYTES NFR BLD: 18 %
MCH RBC QN AUTO: 30.5 PG (ref 26–34)
MCHC RBC AUTO-ENTMCNC: 33.3 G/DL (ref 31–36)
MCV RBC AUTO: 91.7 FL (ref 80–100)
METAMYELOCYTES NFR BLD MANUAL: 1 %
MONOCYTES # BLD: 0.1 K/UL (ref 0–1.3)
MONOCYTES NFR BLD: 1 %
NEUTROPHILS # BLD: 5.8 K/UL (ref 1.7–7.7)
NEUTROPHILS NFR BLD: 78 %
NT-PROBNP SERPL-MCNC: <36 PG/ML (ref 0–449)
PLATELET # BLD AUTO: 246 K/UL (ref 135–450)
PLATELET BLD QL SMEAR: ADEQUATE
PMV BLD AUTO: 8.7 FL (ref 5–10.5)
POTASSIUM SERPL-SCNC: 4.3 MMOL/L (ref 3.5–5.1)
PROT SERPL-MCNC: 8.8 G/DL (ref 6.4–8.2)
RBC # BLD AUTO: 4.54 M/UL (ref 4–5.2)
RBC MORPH BLD: NORMAL
SLIDE REVIEW: ABNORMAL
SODIUM SERPL-SCNC: 140 MMOL/L (ref 136–145)
TROPONIN, HIGH SENSITIVITY: 12 NG/L (ref 0–14)
TROPONIN, HIGH SENSITIVITY: 14 NG/L (ref 0–14)
WBC # BLD AUTO: 7.3 K/UL (ref 4–11)

## 2023-07-03 PROCEDURE — 99285 EMERGENCY DEPT VISIT HI MDM: CPT

## 2023-07-03 PROCEDURE — 83880 ASSAY OF NATRIURETIC PEPTIDE: CPT

## 2023-07-03 PROCEDURE — 80053 COMPREHEN METABOLIC PANEL: CPT

## 2023-07-03 PROCEDURE — 85379 FIBRIN DEGRADATION QUANT: CPT

## 2023-07-03 PROCEDURE — 6360000004 HC RX CONTRAST MEDICATION: Performed by: PHYSICIAN ASSISTANT

## 2023-07-03 PROCEDURE — 71260 CT THORAX DX C+: CPT

## 2023-07-03 PROCEDURE — 85025 COMPLETE CBC W/AUTO DIFF WBC: CPT

## 2023-07-03 PROCEDURE — 36415 COLL VENOUS BLD VENIPUNCTURE: CPT

## 2023-07-03 PROCEDURE — 84484 ASSAY OF TROPONIN QUANT: CPT

## 2023-07-03 PROCEDURE — 93005 ELECTROCARDIOGRAM TRACING: CPT | Performed by: EMERGENCY MEDICINE

## 2023-07-03 PROCEDURE — 71045 X-RAY EXAM CHEST 1 VIEW: CPT

## 2023-07-03 RX ORDER — ALBUTEROL SULFATE 90 UG/1
AEROSOL, METERED RESPIRATORY (INHALATION)
Qty: 18 G | Refills: 0 | Status: SHIPPED | OUTPATIENT
Start: 2023-07-03

## 2023-07-03 RX ORDER — SENNOSIDES 8.6 MG
650 CAPSULE ORAL EVERY 8 HOURS PRN
COMMUNITY

## 2023-07-03 RX ORDER — GUAIFENESIN 600 MG/1
1200 TABLET, EXTENDED RELEASE ORAL 2 TIMES DAILY
COMMUNITY

## 2023-07-03 RX ADMIN — IOHEXOL 75 ML: 350 INJECTION, SOLUTION INTRAVENOUS at 16:27

## 2023-07-03 ASSESSMENT — ENCOUNTER SYMPTOMS
STRIDOR: 0
APNEA: 0
SHORTNESS OF BREATH: 1
COUGH: 1
DIARRHEA: 0
CHEST TIGHTNESS: 0
CONSTIPATION: 0
VOMITING: 0
NAUSEA: 0
CHOKING: 0
ABDOMINAL PAIN: 0
WHEEZING: 0
BACK PAIN: 0
COLOR CHANGE: 0

## 2023-07-03 ASSESSMENT — HEART SCORE: ECG: 0

## 2023-07-03 ASSESSMENT — PAIN - FUNCTIONAL ASSESSMENT: PAIN_FUNCTIONAL_ASSESSMENT: NONE - DENIES PAIN

## 2023-07-03 NOTE — TELEPHONE ENCOUNTER
Hx of pneumonia she feels the same way with fatigue weak SOB She would like to get a chest xray done.  Please advise call back [phone]

## 2023-07-03 NOTE — PROGRESS NOTES
Pharmacy Home Medication Reconciliation Note    A medication reconciliation has been completed for Kenny Gaitan 1945    Pharmacy: [address] [place] [address]  Information provided by: patient    The patient's home medication list is as follows: No current facility-administered medications on file prior to encounter. Current Outpatient Medications on File Prior to Encounter   Medication Sig Dispense Refill    acetaminophen (TYLENOL) 650 MG extended release tablet Take 1 tablet by mouth every 8 hours as needed for Pain      guaiFENesin (MUCINEX) 600 MG extended release tablet Take 2 tablets by mouth 2 times daily      simvastatin (ZOCOR) 40 MG tablet TAKE ONE TABLET BY MOUTH ONCE NIGHTLY 90 tablet 1    D3-1000 25 MCG (1000 UT) TABS tablet TAKE ONE TABLET BY MOUTH DAILY 30 tablet 5    levothyroxine (SYNTHROID) 25 MCG tablet TAKE ONE TABLET BY MOUTH DAILY 80 tablet 1    pioglitazone (ACTOS) 15 MG tablet TAKE ONE TABLET BY MOUTH DAILY STOP METFORMIN AND GLIPIZIDE 30 tablet 5    blood glucose test strips (ONETOUCH ULTRA) strip USE ONE STRIP TO TEST TWICE A  strip 12    amLODIPine (NORVASC) 10 MG tablet TAKE ONE TABLET BY MOUTH DAILY (Patient taking differently: Take 1 tablet by mouth every evening TAKE ONE TABLET BY MOUTH DAILY) 90 tablet 3       Of note, patient has only taken levothyroxine today; nothing else. Patient Marthamayi Pretty have NSAIDs due to risk of bleeding ulcers: added to allergy list.    Timing of last doses updated. Thank you,  Chele Chung

## 2023-07-03 NOTE — ED PROVIDER NOTES
The Ekg interpreted by me in the absence of a cardiologist shows. normal sinus rhythm with a rate of 61  Axis is   Normal  QTc is  normal  Intervals and Durations are unremarkable. No specific ST-T wave changes appreciated. No evidence of acute ischemia. No significant change from prior EKG dated 5/6/19    I only performed EKG interpretation and was not otherwise involved in the care of this patient.           Yony Bradley MD  07/03/23 1913
she is not aware of. She will be started on albuterol. States she would prefer to follow-up with her primary doctor so she will be discharged follow-up as opposed to hospitalized at this time. Medications given:  Medications   iohexol (OMNIPAQUE 350) solution 75 mL (75 mLs IntraVENous Given 7/3/23 8786)        Discharge Medications:  New Prescriptions    ALBUTEROL SULFATE HFA (PROVENTIL;VENTOLIN;PROAIR) 108 (90 BASE) MCG/ACT INHALER    Use 2 puffs 4 times daily for 7 days then as needed for wheezing. Dispense with Spacer and instruct in use. At patient's preference may use 60 dose MDI. May Sub Pro-Air or Proventil as needed per insurance. PROCEDURES  None    CLINICAL IMPRESSION  1. Shortness of breath        DISPOSITION  Felicia Lawrence was discharged in stable condition. CRITICAL CARE TIME  None        Electronically signed by: Janeth Og.  Ari Martin., GARCIA BULLARD Zetta Olmsted, 7/3/2023  6:35 PM        Katiuska Roy,   07/03/23 8258
(*November 21, 2016), Screening mammogram, encounter for (*February 21, 2018), Screening mammogram, encounter for (*April 29, 2019), and Villous adenoma (1999). CONSULTS: (Who and What was discussed)  None      Social Determinants Significantly Affecting Health : None    Records Reviewed (External and Source) None    CC/HPI Summary, DDx, ED Course, and Reassessment: Patient is a 70-year-old female who presents ED with complaint of shortness of breath. She complains of shortness of breath which she states is occurred with exertion over the past 2 weeks. She denies any chest pain or chest tightness. Became concerned and came to the ED for further evaluation treatment. She has longstanding history of smoking. Denies history of COPD. IV established and blood work obtained. Troponin was 14. Delta troponin 12. No significant delta change. CBC showed normal white count, hemoglobin and platelets. CMP showed anion gap of 17 with CO2 of 20. Otherwise relatively unremarkable. BNP less than 36. D-dimer 0.78. D-dimer just at the age-adjusted cut off. Given this CT of the chest obtained. CT showed no evidence of pulmonary embolism. Moderate pulmonary emphysema without evidence of pneumonia. There is subcentimeter mediastinal and hilar lymph nodes noted. Stable 4 mm right lobe pulmonary nodule. Stable 1.4 cm left periaortic and retroperitoneal lymph node noted at the level of the mid kidney. Patient informed of findings here in the ED. Instructed may need repeat CT of the chest in 1 year. These are stable from previous CT on January 16, 2023. EKG inter by attending. Upon repeat evaluation patient is feeling \"fine\". She denies any chest tightness or chest pain. She has heart score of 4. Patient has ongoing shortness of breath especially with exertion over the past 2 weeks. Could be due to underlying lung disease and undiagnosed emphysema given her longstanding smoking history.   We discussed ACS and

## 2023-07-03 NOTE — TELEPHONE ENCOUNTER
Patient CAD and has had pneumonia before complaining of  States shortness of breath. Had a recent episode where going outside when she came and felt like she was going to pass out and could not catch her breath for at least 5 minutes. Reports some leg swelling. Sleeping with CPAP with history of sleep apnea. Patient instructed to go to the emergency room. States she will go and have daughter take her.

## 2023-07-04 LAB
EKG ATRIAL RATE: 61 BPM
EKG DIAGNOSIS: NORMAL
EKG P AXIS: 77 DEGREES
EKG P-R INTERVAL: 160 MS
EKG Q-T INTERVAL: 434 MS
EKG QRS DURATION: 84 MS
EKG QTC CALCULATION (BAZETT): 436 MS
EKG R AXIS: -3 DEGREES
EKG T AXIS: 44 DEGREES
EKG VENTRICULAR RATE: 61 BPM

## 2023-07-04 PROCEDURE — 93010 ELECTROCARDIOGRAM REPORT: CPT | Performed by: INTERNAL MEDICINE

## 2023-07-24 ENCOUNTER — OFFICE VISIT (OUTPATIENT)
Dept: PULMONOLOGY | Age: 78
End: 2023-07-24
Payer: MEDICARE

## 2023-07-24 VITALS
HEIGHT: 65 IN | DIASTOLIC BLOOD PRESSURE: 67 MMHG | SYSTOLIC BLOOD PRESSURE: 137 MMHG | OXYGEN SATURATION: 98 % | BODY MASS INDEX: 37.15 KG/M2 | HEART RATE: 58 BPM | WEIGHT: 223 LBS

## 2023-07-24 DIAGNOSIS — G47.33 OBSTRUCTIVE SLEEP APNEA SYNDROME: Primary | ICD-10-CM

## 2023-07-24 DIAGNOSIS — E11.65 TYPE 2 DIABETES MELLITUS WITH HYPERGLYCEMIA, WITHOUT LONG-TERM CURRENT USE OF INSULIN (HCC): Chronic | ICD-10-CM

## 2023-07-24 DIAGNOSIS — I10 HYPERTENSION, ESSENTIAL: Chronic | ICD-10-CM

## 2023-07-24 DIAGNOSIS — E66.01 SEVERE OBESITY (BMI 35.0-39.9) WITH COMORBIDITY (HCC): Chronic | ICD-10-CM

## 2023-07-24 PROCEDURE — 99214 OFFICE O/P EST MOD 30 MIN: CPT | Performed by: INTERNAL MEDICINE

## 2023-07-24 PROCEDURE — 3078F DIAST BP <80 MM HG: CPT | Performed by: INTERNAL MEDICINE

## 2023-07-24 PROCEDURE — 1123F ACP DISCUSS/DSCN MKR DOCD: CPT | Performed by: INTERNAL MEDICINE

## 2023-07-24 PROCEDURE — 3075F SYST BP GE 130 - 139MM HG: CPT | Performed by: INTERNAL MEDICINE

## 2023-07-24 ASSESSMENT — SLEEP AND FATIGUE QUESTIONNAIRES
HOW LIKELY ARE YOU TO NOD OFF OR FALL ASLEEP WHILE SITTING QUIETLY AFTER LUNCH WITHOUT ALCOHOL: 1
ESS TOTAL SCORE: 6
HOW LIKELY ARE YOU TO NOD OFF OR FALL ASLEEP WHILE SITTING AND TALKING TO SOMEONE: 1
HOW LIKELY ARE YOU TO NOD OFF OR FALL ASLEEP WHILE SITTING INACTIVE IN A PUBLIC PLACE: 0
HOW LIKELY ARE YOU TO NOD OFF OR FALL ASLEEP WHILE WATCHING TV: 2
HOW LIKELY ARE YOU TO NOD OFF OR FALL ASLEEP IN A CAR, WHILE STOPPED FOR A FEW MINUTES IN TRAFFIC: 0
HOW LIKELY ARE YOU TO NOD OFF OR FALL ASLEEP WHILE SITTING AND READING: 1
HOW LIKELY ARE YOU TO NOD OFF OR FALL ASLEEP WHEN YOU ARE A PASSENGER IN A CAR FOR AN HOUR WITHOUT A BREAK: 1
HOW LIKELY ARE YOU TO NOD OFF OR FALL ASLEEP WHILE LYING DOWN TO REST IN THE AFTERNOON WHEN CIRCUMSTANCES PERMIT: 0

## 2023-07-24 NOTE — ASSESSMENT & PLAN NOTE
New Problem - On Tx. Reviewed sleep study and download compliance data with patient. Supplies and parts as needed for her machine. These are medically necessary. Limit caffeine use after 3pm.    Her mask adjusted today to help with out the cause for mask leak. We will change her pressure range to 12-20. The patient may need change to bilevel and/or an in lab titration based on oximetry results and response to pressure changes. Needs overnight oximetry on APAP (insurance mandated) even though standard of care is to do in-lab titration.

## 2023-07-24 NOTE — PROGRESS NOTES
Sherrill Croft MD Lauran Cushing CNP  Andrajosué Owusu CNP [facility] [address] [facility], [address] [place] [address] [phone]   [address]  [place],  [place] [address] [place] [address] Road [phone] MEDICINE  [facility] [address]  Dept: [phone]  Dept Fax: [phone]  Loc: [phone]      Assessment/Plan:      1. Obstructive sleep apnea syndrome  Assessment & Plan:  New Problem - On Tx. Reviewed sleep study and download compliance data with patient. Supplies and parts as needed for her machine. These are medically necessary. Limit caffeine use after 3pm.    Her mask adjusted today to help with out the cause for mask leak. We will change her pressure range to 12-20. The patient may need change to bilevel and/or an in lab titration based on oximetry results and response to pressure changes. Needs overnight oximetry on APAP (insurance mandated) even though standard of care is to do in-lab titration. 2. Hypertension, essential  Assessment & Plan:  Chronic- Stable. Discussed the importance of treating sleep apnea as part of the management of this disorder. Cont any meds per PCP and other physicians. 3. Type 2 diabetes mellitus with hyperglycemia, without long-term current use of insulin (HCC)  Assessment & Plan:  Chronic- Stable. Discussed the importance of treating sleep apnea as part of the management of this disorder. Cont any meds per PCP and other physicians. 4. Severe obesity (BMI 35.0-39. 9) with comorbidity ([address])  Assessment & Plan:  Chronic-not stable:  Discussed importance of treating obstructive sleep apnea and getting sufficient sleep to assist with weight control. Encouraged her to work on weight loss through diet and exercise. Recommended DASH or Mediterranean diets.        Reviewed, analyzed, and documented physiologic data from patient's PAP

## 2023-07-25 ENCOUNTER — TELEPHONE (OUTPATIENT)
Dept: PULMONOLOGY | Age: 78
End: 2023-07-25

## 2023-07-25 NOTE — PROGRESS NOTES
23    Electronically signed by Clarissa Morgan MD on 2023 at 11:05 AM    Summer Aviles  1945  [place] [facility] [id number] [phone] (home)   [phone] (mobile)      Insurance Info (confirm with patient if correct):  Payer/Plan Subscr  Sex Relation Sub. Ins. ID Effective Group Num   1.  [address] Z[address] 1945 Female Self [id number] [id number]                                    [id number]

## 2023-07-25 NOTE — TELEPHONE ENCOUNTER
Pt called in stating that she brought her machine in for appt recently due to a leak. Pt stated that last night the machine was leaking so bad that she had to shut it off. What should pt do about the leak? Please call pt.     [phone]

## 2023-08-23 ENCOUNTER — TELEPHONE (OUTPATIENT)
Dept: PULMONOLOGY | Age: 78
End: 2023-08-23

## 2023-08-23 NOTE — TELEPHONE ENCOUNTER
Message left for Jarrett Freedman at [facility] to see if pt was called for a mask refit.

## 2023-08-24 ENCOUNTER — TELEPHONE (OUTPATIENT)
Dept: PULMONOLOGY | Age: 78
End: 2023-08-24

## 2023-08-24 NOTE — TELEPHONE ENCOUNTER
Per Jarrett Freedman pt was in and received a new water chamber 08/01/23 and is not having issues with leak.

## 2023-08-25 NOTE — TELEPHONE ENCOUNTER
Pt called and lm stating that her machine is still leaking and would like a call back.      [phone]

## 2023-09-29 SDOH — ECONOMIC STABILITY: HOUSING INSECURITY
IN THE LAST 12 MONTHS, WAS THERE A TIME WHEN YOU DID NOT HAVE A STEADY PLACE TO SLEEP OR SLEPT IN A SHELTER (INCLUDING NOW)?: NO

## 2023-09-29 SDOH — ECONOMIC STABILITY: FOOD INSECURITY: WITHIN THE PAST 12 MONTHS, THE FOOD YOU BOUGHT JUST DIDN'T LAST AND YOU DIDN'T HAVE MONEY TO GET MORE.: NEVER TRUE

## 2023-09-29 SDOH — ECONOMIC STABILITY: TRANSPORTATION INSECURITY
IN THE PAST 12 MONTHS, HAS LACK OF TRANSPORTATION KEPT YOU FROM MEETINGS, WORK, OR FROM GETTING THINGS NEEDED FOR DAILY LIVING?: PATIENT DECLINED

## 2023-09-29 SDOH — ECONOMIC STABILITY: INCOME INSECURITY: HOW HARD IS IT FOR YOU TO PAY FOR THE VERY BASICS LIKE FOOD, HOUSING, MEDICAL CARE, AND HEATING?: PATIENT DECLINED

## 2023-09-29 SDOH — ECONOMIC STABILITY: FOOD INSECURITY: WITHIN THE PAST 12 MONTHS, YOU WORRIED THAT YOUR FOOD WOULD RUN OUT BEFORE YOU GOT MONEY TO BUY MORE.: NEVER TRUE

## 2023-09-29 ASSESSMENT — PATIENT HEALTH QUESTIONNAIRE - PHQ9
SUM OF ALL RESPONSES TO PHQ9 QUESTIONS 1 & 2: 0
2. FEELING DOWN, DEPRESSED OR HOPELESS: NOT AT ALL
SUM OF ALL RESPONSES TO PHQ QUESTIONS 1-9: 0
2. FEELING DOWN, DEPRESSED OR HOPELESS: 0
SUM OF ALL RESPONSES TO PHQ QUESTIONS 1-9: 0
SUM OF ALL RESPONSES TO PHQ9 QUESTIONS 1 & 2: 0
SUM OF ALL RESPONSES TO PHQ QUESTIONS 1-9: 0
1. LITTLE INTEREST OR PLEASURE IN DOING THINGS: 0
SUM OF ALL RESPONSES TO PHQ QUESTIONS 1-9: 0
1. LITTLE INTEREST OR PLEASURE IN DOING THINGS: NOT AT ALL

## 2023-10-02 ENCOUNTER — OFFICE VISIT (OUTPATIENT)
Dept: PRIMARY CARE CLINIC | Age: 78
End: 2023-10-02
Payer: MEDICARE

## 2023-10-02 VITALS
TEMPERATURE: 97 F | SYSTOLIC BLOOD PRESSURE: 136 MMHG | BODY MASS INDEX: 39.44 KG/M2 | WEIGHT: 237 LBS | DIASTOLIC BLOOD PRESSURE: 69 MMHG | HEART RATE: 79 BPM | OXYGEN SATURATION: 95 %

## 2023-10-02 DIAGNOSIS — R06.09 DOE (DYSPNEA ON EXERTION): Primary | ICD-10-CM

## 2023-10-02 DIAGNOSIS — E11.8 TYPE 2 DIABETES MELLITUS WITH COMPLICATION, WITHOUT LONG-TERM CURRENT USE OF INSULIN (HCC): ICD-10-CM

## 2023-10-02 DIAGNOSIS — J43.8 OTHER EMPHYSEMA (HCC): ICD-10-CM

## 2023-10-02 DIAGNOSIS — R91.1 PULMONARY NODULE: ICD-10-CM

## 2023-10-02 DIAGNOSIS — J43.2 CENTRILOBULAR EMPHYSEMA (HCC): ICD-10-CM

## 2023-10-02 DIAGNOSIS — E03.9 ACQUIRED HYPOTHYROIDISM: ICD-10-CM

## 2023-10-02 DIAGNOSIS — E78.2 MIXED HYPERLIPIDEMIA: ICD-10-CM

## 2023-10-02 PROCEDURE — 3074F SYST BP LT 130 MM HG: CPT | Performed by: INTERNAL MEDICINE

## 2023-10-02 PROCEDURE — 99214 OFFICE O/P EST MOD 30 MIN: CPT | Performed by: INTERNAL MEDICINE

## 2023-10-02 PROCEDURE — 1123F ACP DISCUSS/DSCN MKR DOCD: CPT | Performed by: INTERNAL MEDICINE

## 2023-10-02 PROCEDURE — 3078F DIAST BP <80 MM HG: CPT | Performed by: INTERNAL MEDICINE

## 2023-10-02 RX ORDER — BUDESONIDE, GLYCOPYRROLATE, AND FORMOTEROL FUMARATE 160; 9; 4.8 UG/1; UG/1; UG/1
2 AEROSOL, METERED RESPIRATORY (INHALATION) EVERY 12 HOURS
Qty: 1 EACH | Refills: 12 | Status: SHIPPED | OUTPATIENT
Start: 2023-10-02

## 2023-10-02 RX ORDER — ALBUTEROL SULFATE 90 UG/1
2 AEROSOL, METERED RESPIRATORY (INHALATION) 4 TIMES DAILY PRN
Qty: 18 G | Refills: 0 | Status: SHIPPED | OUTPATIENT
Start: 2023-10-02

## 2023-10-02 NOTE — PROGRESS NOTES
Gonzalo Phillips (:  1945) is a 66 y.o. female,Established patient, here for evaluation of the following chief complaint(s):  Follow-up         ASSESSMENT/PLAN:  1. DUFF (dyspnea on exertion) with history of moderate pulmonary emphysema experiencing dyspnea on exertion. [place] O2 sat today was 96%. Only has albuterol. We will start Breztri. Also with coronary calcification on CT of the chest and dyspnea on exertion high risk for CAD so we will get stress test.  -     Budeson-Glycopyrrol-Formoterol (BREZTRI AEROSPHERE) 160-9-4.8 MCG/ACT AERO; Inhale 2 puffs into the lungs in the morning and 2 puffs in the evening. Maintenance inhaler. , Disp-1 each, R-12Normal  -     albuterol sulfate HFA (VENTOLIN HFA) 108 (90 Base) MCG/ACT inhaler; Inhale 2 puffs into the lungs 4 times daily as needed for Wheezing (emergency inhaler), Disp-18 g, R-0Normal  -     NM MYOCARDIAL SPECT REST EXERCISE OR RX; Future  -     Echo 2d w doppler w color w contrast; Future  2. Centrilobular emphysema ([address]) uncontrolled start Breztri.  -     Budeson-Glycopyrrol-Formoterol (BREZTRI AEROSPHERE) 160-9-4.8 MCG/ACT AERO; Inhale 2 puffs into the lungs in the morning and 2 puffs in the evening. Maintenance inhaler. , Disp-1 each, R-12Normal    #3 pulmonary nodule stable on recent CT scan follow-up in a year, which would be 2024  -     CT CHEST WO CONTRAST; Future    4. Acquired hypothyroidism clinically euthyroid on Synthroid. Lab Results   Component Value Date    TSH 3.90 2019    TSHREFLEX 4.56 (H) 2022   Check TSH. 5.  Mixed hyperlipidemia. Tolerating simvastatin 40 mg, dose was increased in  from 20-40 mg daily to get LDL below 70. Monitor fasting lipid.   Lab Results   Component Value Date    CHOL 184 2023    CHOL 195 2022    CHOL 159 2021     Lab Results   Component Value Date    TRIG 64 2023    TRIG 120 2022    TRIG 102 2021     Lab Results   Component Value Date    HDL 80

## 2023-10-08 PROBLEM — R91.1 PULMONARY NODULE: Status: ACTIVE | Noted: 2023-10-08

## 2023-10-08 PROBLEM — R06.09 DOE (DYSPNEA ON EXERTION): Status: ACTIVE | Noted: 2023-10-08

## 2023-10-08 PROBLEM — J43.2 CENTRILOBULAR EMPHYSEMA (HCC): Status: ACTIVE | Noted: 2023-01-19

## 2023-10-08 PROBLEM — E03.9 ACQUIRED HYPOTHYROIDISM: Status: ACTIVE | Noted: 2023-10-08

## 2023-10-08 ASSESSMENT — ENCOUNTER SYMPTOMS
SPUTUM PRODUCTION: 0
RHINORRHEA: 0
ORTHOPNEA: 0
COUGH: 0
VOMITING: 0
VISUAL CHANGE: 0
CHEST TIGHTNESS: 1
BLURRED VISION: 0
SWOLLEN GLANDS: 0
SORE THROAT: 0
EYES NEGATIVE: 1
SHORTNESS OF BREATH: 1
ALLERGIC/IMMUNOLOGIC NEGATIVE: 1
WHEEZING: 0

## 2023-10-09 DIAGNOSIS — E03.9 ACQUIRED HYPOTHYROIDISM: ICD-10-CM

## 2023-10-09 DIAGNOSIS — E11.8 TYPE 2 DIABETES MELLITUS WITH COMPLICATION, WITHOUT LONG-TERM CURRENT USE OF INSULIN (HCC): ICD-10-CM

## 2023-10-09 DIAGNOSIS — E78.2 MIXED HYPERLIPIDEMIA: ICD-10-CM

## 2023-10-09 LAB
CHOLEST SERPL-MCNC: 188 MG/DL (ref 0–199)
CREAT UR-MCNC: 83.9 MG/DL (ref 28–259)
HDLC SERPL-MCNC: 81 MG/DL (ref 40–60)
LDLC SERPL CALC-MCNC: 92 MG/DL
MICROALBUMIN UR DL<=1MG/L-MCNC: 22.1 MG/DL
MICROALBUMIN/CREAT UR: 263.4 MG/G (ref 0–30)
TRIGL SERPL-MCNC: 76 MG/DL (ref 0–150)
TSH SERPL DL<=0.005 MIU/L-ACNC: 2.84 UIU/ML (ref 0.27–4.2)
VLDLC SERPL CALC-MCNC: 15 MG/DL

## 2023-10-10 LAB
EST. AVERAGE GLUCOSE BLD GHB EST-MCNC: 157.1 MG/DL
HBA1C MFR BLD: 7.1 %

## 2023-10-11 LAB — FRUCTOSAMINE SERPL-SCNC: 267 UMOL/L (ref 205–285)

## 2023-10-13 DIAGNOSIS — R80.9 PROTEINURIA, UNSPECIFIED TYPE: Primary | ICD-10-CM

## 2023-10-13 DIAGNOSIS — R80.9 PROTEINURIA, UNSPECIFIED TYPE: ICD-10-CM

## 2023-10-13 NOTE — PROGRESS NOTES
Add immunofixation of serum and urine to further evaluate proteinuria and diabetes, start farxiga 5 mg qd. Allergic to ace and arb's.

## 2023-10-13 NOTE — RESULT ENCOUNTER NOTE
Normal cholesterol and thyroid blood tests. Continue medication. The protein level is elevated in the urine. Come to the lab to have an additional blood and urine test to further evaluate this. Would like to start farxiga 5 mg daily to lower the protein and protect the kidneys.

## 2023-10-16 LAB
ALBUMIN SERPL ELPH-MCNC: 3.8 G/DL (ref 3.1–4.9)
ALPHA1 GLOB SERPL ELPH-MCNC: 0.4 G/DL (ref 0.2–0.4)
ALPHA2 GLOB SERPL ELPH-MCNC: 1.1 G/DL (ref 0.4–1.1)
B-GLOBULIN SERPL ELPH-MCNC: 1.4 G/DL (ref 0.9–1.6)
GAMMA GLOB SERPL ELPH-MCNC: 1.3 G/DL (ref 0.6–1.8)
PROT SERPL-MCNC: 7.9 G/DL (ref 6.4–8.2)

## 2023-10-18 LAB — SPE/IFE INTERPRETATION: NORMAL

## 2023-10-23 DIAGNOSIS — R30.0 DYSURIA: Primary | ICD-10-CM

## 2023-10-23 RX ORDER — CEFUROXIME AXETIL 250 MG/1
250 TABLET ORAL 2 TIMES DAILY
Qty: 14 TABLET | Refills: 0 | Status: SHIPPED | OUTPATIENT
Start: 2023-10-23 | End: 2023-10-30

## 2023-10-24 ENCOUNTER — HOSPITAL ENCOUNTER (OUTPATIENT)
Dept: NON INVASIVE DIAGNOSTICS | Age: 78
Discharge: HOME | End: 2023-10-24
Payer: MEDICARE

## 2023-10-24 ENCOUNTER — HOSPITAL ENCOUNTER (OUTPATIENT)
Dept: CT IMAGING | Age: 78
Discharge: HOME | End: 2023-10-24
Payer: MEDICARE

## 2023-10-24 DIAGNOSIS — R91.1 PULMONARY NODULE: ICD-10-CM

## 2023-10-24 PROCEDURE — 78452 HT MUSCLE IMAGE SPECT MULT: CPT

## 2023-10-24 PROCEDURE — 71250 CT THORAX DX C-: CPT

## 2023-10-24 PROCEDURE — 3430000000 HC RX DIAGNOSTIC RADIOPHARMACEUTICAL: Performed by: INTERNAL MEDICINE

## 2023-10-24 PROCEDURE — A9502 TC99M TETROFOSMIN: HCPCS | Performed by: INTERNAL MEDICINE

## 2023-10-24 PROCEDURE — 93017 CV STRESS TEST TRACING ONLY: CPT | Performed by: INTERNAL MEDICINE

## 2023-10-24 RX ADMIN — TETROFOSMIN 30 MILLICURIE: 1.38 INJECTION, POWDER, LYOPHILIZED, FOR SOLUTION INTRAVENOUS at 09:20

## 2023-10-24 RX ADMIN — TETROFOSMIN 10 MILLICURIE: 1.38 INJECTION, POWDER, LYOPHILIZED, FOR SOLUTION INTRAVENOUS at 08:13

## 2023-10-24 NOTE — PROGRESS NOTES
Patient instructed on Bryant Protocol Stress Test Procedure including possible side effects and adverse reactions. Verbalizes knowledge and understanding and denies having any questions.

## 2023-10-27 DIAGNOSIS — R09.02 EXERCISE HYPOXEMIA: ICD-10-CM

## 2023-10-27 DIAGNOSIS — J43.2 CENTRILOBULAR EMPHYSEMA (HCC): Primary | ICD-10-CM

## 2023-10-27 NOTE — RESULT ENCOUNTER NOTE
Heart circulation was normal but your oxygen level decreases with exercise. This is due to the emphysema of the lungs. I am sending you to a lung doctor to treat you for this. They will also rule out pulmonary hypertension. Call the number below for an appointment with the lung specialist and also seeing me in the office next week to evaluate how you are doing on your inhalers. [facility] MD Cecilia  [address] [place] [address] 842, [address]  Phone: [phone]    Remember to schedule the echocardiogram to further evaluate why your oxygen level drops with exertion. The echocardiogram is a heart ultrasound looks at all your heart chambers and valves.

## 2023-10-31 ENCOUNTER — OFFICE VISIT (OUTPATIENT)
Dept: PRIMARY CARE CLINIC | Age: 78
End: 2023-10-31
Payer: MEDICARE

## 2023-10-31 VITALS
SYSTOLIC BLOOD PRESSURE: 134 MMHG | OXYGEN SATURATION: 96 % | DIASTOLIC BLOOD PRESSURE: 71 MMHG | HEIGHT: 65 IN | BODY MASS INDEX: 38.82 KG/M2 | WEIGHT: 233 LBS | HEART RATE: 74 BPM | TEMPERATURE: 97.1 F

## 2023-10-31 DIAGNOSIS — R06.09 DOE (DYSPNEA ON EXERTION): ICD-10-CM

## 2023-10-31 DIAGNOSIS — N34.2 ATROPHIC URETHRITIS: ICD-10-CM

## 2023-10-31 DIAGNOSIS — R30.0 DYSURIA: ICD-10-CM

## 2023-10-31 DIAGNOSIS — J43.2 CENTRILOBULAR EMPHYSEMA (HCC): Primary | ICD-10-CM

## 2023-10-31 DIAGNOSIS — E55.9 VITAMIN D DEFICIENCY: ICD-10-CM

## 2023-10-31 DIAGNOSIS — E03.9 ACQUIRED HYPOTHYROIDISM: ICD-10-CM

## 2023-10-31 DIAGNOSIS — R09.02 EXERCISE HYPOXEMIA: ICD-10-CM

## 2023-10-31 PROCEDURE — 99214 OFFICE O/P EST MOD 30 MIN: CPT | Performed by: INTERNAL MEDICINE

## 2023-10-31 PROCEDURE — 3078F DIAST BP <80 MM HG: CPT | Performed by: INTERNAL MEDICINE

## 2023-10-31 PROCEDURE — 1123F ACP DISCUSS/DSCN MKR DOCD: CPT | Performed by: INTERNAL MEDICINE

## 2023-10-31 PROCEDURE — 3075F SYST BP GE 130 - 139MM HG: CPT | Performed by: INTERNAL MEDICINE

## 2023-10-31 RX ORDER — CONJUGATED ESTROGENS 0.62 MG/G
0.5 CREAM VAGINAL
Qty: 30 G | Refills: 1 | Status: SHIPPED | OUTPATIENT
Start: 2023-11-02 | End: 2024-12-26

## 2023-10-31 RX ORDER — LEVOTHYROXINE SODIUM 0.03 MG/1
25 TABLET ORAL DAILY
Qty: 90 TABLET | Refills: 3 | Status: SHIPPED | OUTPATIENT
Start: 2023-10-31

## 2023-10-31 RX ORDER — MULTIVIT-MIN/IRON/FOLIC ACID/K 18-600-40
1 CAPSULE ORAL DAILY
Qty: 90 TABLET | Refills: 3 | Status: SHIPPED | OUTPATIENT
Start: 2023-10-31

## 2023-10-31 ASSESSMENT — PATIENT HEALTH QUESTIONNAIRE - PHQ9
SUM OF ALL RESPONSES TO PHQ QUESTIONS 1-9: 0
1. LITTLE INTEREST OR PLEASURE IN DOING THINGS: 0
SUM OF ALL RESPONSES TO PHQ9 QUESTIONS 1 & 2: 0
2. FEELING DOWN, DEPRESSED OR HOPELESS: 0

## 2023-10-31 ASSESSMENT — ENCOUNTER SYMPTOMS
CONSTIPATION: 0
CHEST TIGHTNESS: 0
HEARTBURN: 0
WHEEZING: 0
RHINORRHEA: 0
SPUTUM PRODUCTION: 0
EYES NEGATIVE: 1
COUGH: 0
TROUBLE SWALLOWING: 0
HOARSE VOICE: 0
DIARRHEA: 0
HAIR LOSS: 0
SORE THROAT: 0
DIFFICULTY BREATHING: 1
SHORTNESS OF BREATH: 1
HEMOPTYSIS: 0
ALLERGIC/IMMUNOLOGIC NEGATIVE: 1
VISUAL CHANGE: 0
VOMITING: 0

## 2023-10-31 ASSESSMENT — COPD QUESTIONNAIRES: COPD: 1

## 2023-10-31 NOTE — PROGRESS NOTES
Katherin Baer (:  1945) is a 66 y.o. female,Established patient, here for evaluation of the following chief complaint(s):  Follow-up         ASSESSMENT/PLAN:  1. Centrilobular emphysema ([address]) patient is feeling better taking the Trelegy and notices he can walk longer and less episodes of shortness of breath. However with dyspnea on exertion and was not sure how much was due to COPD and how much could be from coronary artery disease with significant risk factors she had a stress test which was low probability of CAD but significant hypoxemia with exercise. Continue Trelegy and see exercise hypoxemia below. 2. Exercise hypoxemia with COPD admitted on stress test.  Reprinted referral for pulmonary to further evaluate. Patient is to get echo exam and she has very mild edema of the ankles and sleeps with 2 pillows chronically but no PND. We will get  BNP today exertion and has order for echo reminded to get this done to see if this is a contributor. 3. Atrophic urethritis urinary frequency dysuria. Blood sugars have been controlled with A1c 7.1. Recent urine culture negative. Consistent with atrophic urethritis. History of hysterectomy with 1 ovary remaining. We will start Premarin vaginal cream twice weekly  -     estrogens conjugated (PREMARIN) 0.625 MG/GM CREA vaginal cream; Place 0.5 g vaginally Twice a Week, Vaginal, TWICE WEEKLY Starting Thu 2023, Until Thu 2024, For 420 days, Disp-30 g, R-1, Normal  4. Acquired hypothyroidism reviewed TSH with patient normal 2.8 with levothyroxine 25 mcg daily will renew prescription and continue and clinically euthyroid. -     levothyroxine (SYNTHROID) 25 MCG tablet; Take 1 tablet by mouth daily, Disp-90 tablet, R-3Normal  5. Vitamin D deficiency level increased from 23.7-30.9 on vitamin D3 1000 units daily. Continue and refill prescription.  -     vitamin D (D3-1000) 25 MCG (1000 UT) TABS tablet;  Take 1 tablet by mouth daily, Disp-90 tablet, Oxytocin Safety Progress Check Note - Coy Chin 23 y o  female MRN: [id number]    Unit/Bed#: L&D 321-01 Encounter: [id number]    Dose (kevin-units/min) Oxytocin: 8 kevin-units/min  Contraction Frequency (minutes): 1-2  Contraction Quality: Moderate  Tachysystole: Yes   Cervical Dilation: 5        Cervical Effacement: 90  Fetal Station: -2  Baseline Rate: 140 bpm  Fetal Heart Rate:  (difficult to trace at times d/t movement)  FHR Category: Category I     Provider Notified: Yes         Vital Signs:   Vitals:    12/09/22 1535   BP: 111/59   Pulse: 77   Resp:    Temp:        Notes/comments: SVE unchanged, FHT Cat I, toco q2  Discussed with attending          Zaki Stone MD 12/9/2022 3:49 PM

## 2023-11-01 LAB
ALBUMIN SERPL-MCNC: 4.9 G/DL (ref 3.4–5)
ANION GAP SERPL CALCULATED.3IONS-SCNC: 16 MMOL/L (ref 3–16)
BUN SERPL-MCNC: 12 MG/DL (ref 7–20)
CALCIUM SERPL-MCNC: 10.7 MG/DL (ref 8.3–10.6)
CHLORIDE SERPL-SCNC: 104 MMOL/L (ref 99–110)
CO2 SERPL-SCNC: 23 MMOL/L (ref 21–32)
CREAT SERPL-MCNC: 0.9 MG/DL (ref 0.6–1.2)
GFR SERPLBLD CREATININE-BSD FMLA CKD-EPI: >60 ML/MIN/{1.73_M2}
GLUCOSE SERPL-MCNC: 101 MG/DL (ref 70–99)
NT-PROBNP SERPL-MCNC: 55 PG/ML (ref 0–449)
PHOSPHATE SERPL-MCNC: 2.9 MG/DL (ref 2.5–4.9)
POTASSIUM SERPL-SCNC: 4 MMOL/L (ref 3.5–5.1)
SODIUM SERPL-SCNC: 143 MMOL/L (ref 136–145)

## 2023-11-02 DIAGNOSIS — E83.52 SERUM CALCIUM ELEVATED: Primary | ICD-10-CM

## 2023-11-02 NOTE — RESULT ENCOUNTER NOTE
Kidney blood tests and the blood sugar for nonfasting was good. The heart failure blood test was negative. The shortness of breath is due to the COPD. Follow-up with the pulmonary doctor. I will repeat the calcium blood test upon your return appointment.

## 2023-11-08 ENCOUNTER — OFFICE VISIT (OUTPATIENT)
Dept: PULMONOLOGY | Age: 78
End: 2023-11-08
Payer: MEDICARE

## 2023-11-08 VITALS
HEART RATE: 76 BPM | BODY MASS INDEX: 38.92 KG/M2 | HEIGHT: 65 IN | OXYGEN SATURATION: 95 % | RESPIRATION RATE: 18 BRPM | SYSTOLIC BLOOD PRESSURE: 100 MMHG | DIASTOLIC BLOOD PRESSURE: 70 MMHG | TEMPERATURE: 97.3 F | WEIGHT: 233.6 LBS

## 2023-11-08 DIAGNOSIS — J43.2 CENTRILOBULAR EMPHYSEMA (HCC): Primary | ICD-10-CM

## 2023-11-08 PROCEDURE — 1123F ACP DISCUSS/DSCN MKR DOCD: CPT | Performed by: INTERNAL MEDICINE

## 2023-11-08 PROCEDURE — 3078F DIAST BP <80 MM HG: CPT | Performed by: INTERNAL MEDICINE

## 2023-11-08 PROCEDURE — 99204 OFFICE O/P NEW MOD 45 MIN: CPT | Performed by: INTERNAL MEDICINE

## 2023-11-08 PROCEDURE — 3074F SYST BP LT 130 MM HG: CPT | Performed by: INTERNAL MEDICINE

## 2023-11-08 ASSESSMENT — ENCOUNTER SYMPTOMS
CHEST TIGHTNESS: 0
CHOKING: 0
COUGH: 0
WHEEZING: 0
SHORTNESS OF BREATH: 1

## 2023-11-08 NOTE — PROGRESS NOTES
[address] [place],[address], SLEEP, AND CRITICAL CARE    Keith Chung (:  1945) is a 66 y.o. female,New patient, here for evaluation of the following chief complaint(s):  New Patient and Shortness of Breath         ASSESSMENT/PLAN:  1. Centrilobular emphysema ([address])  Assessment & Plan:   - seen on CT of the chest, likely result of 30-pack-year smoking  -Quit   -Needs formal PFTs and 6-minute walk test  -Continue Breztri and albuterol as needed  Orders:  -     Full PFT Study With Bronchodilator; Future  -     6 Minute Walk Test; Future      Return in about 4 weeks (around 2023). Future Appointments   Date Time Provider [address]   2023 10:00 AM Tim Chavez MD [facility] - [facility] [place] - [place]D   2023  9:20 AM Javier Byers MD [facility] PULM [facility]   2023  3:00 PM ECHO ROOM 1 [facility]   2024  1:40 PM Tim Chavez MD [facility] - [facility]  [place] - [place]            Subjective   SUBJECTIVE/OBJECTIVE:    Former 30-pack-year smoker quit 40 years ago presents for evaluation of centrilobular emphysema. Patient has had dyspnea on exertion for several years. She recently been started on Breztri and albuterol and thinks that these have improved her symptoms. She denies coughing or wheezing. She has never had an exacerbation or hospitalization. Review of Systems   Constitutional: Negative. Respiratory:  Positive for shortness of breath. Negative for cough, choking, chest tightness and wheezing. Cardiovascular: Negative. Psychiatric/Behavioral: Negative. Objective   Physical Exam     Gen:  No acute distress. Eyes: EOMI. Anicteric sclera. No conjunctival injection. ENT: No discharge. External appearance of ears and nose normal.  Neck: Trachea midline. No mass   Resp:  No crackles. No wheezes. No rhonchi. No dullness on percussion. CV: Regular rate. Regular rhythm. No murmur or rub. No edema.    Neuro:  no focal neurologic

## 2023-11-08 NOTE — ASSESSMENT & PLAN NOTE
- seen on CT of the chest, likely result of 30-pack-year smoking  -Quit 1984  -Needs formal PFTs and 6-minute walk test  -Continue Breztri and albuterol as needed

## 2023-11-13 ENCOUNTER — OFFICE VISIT (OUTPATIENT)
Dept: PRIMARY CARE CLINIC | Age: 78
End: 2023-11-13
Payer: MEDICARE

## 2023-11-13 VITALS
BODY MASS INDEX: 38.77 KG/M2 | WEIGHT: 233 LBS | DIASTOLIC BLOOD PRESSURE: 70 MMHG | OXYGEN SATURATION: 93 % | HEART RATE: 80 BPM | TEMPERATURE: 97.5 F | SYSTOLIC BLOOD PRESSURE: 138 MMHG

## 2023-11-13 DIAGNOSIS — R39.15 URINARY URGENCY: Primary | ICD-10-CM

## 2023-11-13 DIAGNOSIS — E78.2 MIXED HYPERLIPIDEMIA: ICD-10-CM

## 2023-11-13 DIAGNOSIS — R39.15 URINARY URGENCY: ICD-10-CM

## 2023-11-13 DIAGNOSIS — E11.8 TYPE 2 DIABETES MELLITUS WITH COMPLICATION, WITHOUT LONG-TERM CURRENT USE OF INSULIN (HCC): ICD-10-CM

## 2023-11-13 LAB
BACTERIA URNS QL MICRO: ABNORMAL /HPF
BILIRUB UR QL STRIP.AUTO: NEGATIVE
CLARITY UR: CLEAR
COLOR UR: YELLOW
EPI CELLS #/AREA URNS AUTO: 6 /HPF (ref 0–5)
GLUCOSE UR STRIP.AUTO-MCNC: 250 MG/DL
HGB UR QL STRIP.AUTO: NEGATIVE
HYALINE CASTS #/AREA URNS AUTO: 0 /LPF (ref 0–8)
KETONES UR STRIP.AUTO-MCNC: ABNORMAL MG/DL
LEUKOCYTE ESTERASE UR QL STRIP.AUTO: ABNORMAL
NITRITE UR QL STRIP.AUTO: POSITIVE
PH UR STRIP.AUTO: 5.5 [PH] (ref 5–8)
PROT UR STRIP.AUTO-MCNC: 30 MG/DL
RBC CLUMPS #/AREA URNS AUTO: 1 /HPF (ref 0–4)
SP GR UR STRIP.AUTO: 1.02 (ref 1–1.03)
UA DIPSTICK W REFLEX MICRO PNL UR: YES
URN SPEC COLLECT METH UR: ABNORMAL
UROBILINOGEN UR STRIP-ACNC: 0.2 E.U./DL
WBC #/AREA URNS AUTO: 17 /HPF (ref 0–5)

## 2023-11-13 PROCEDURE — 3078F DIAST BP <80 MM HG: CPT | Performed by: INTERNAL MEDICINE

## 2023-11-13 PROCEDURE — 1123F ACP DISCUSS/DSCN MKR DOCD: CPT | Performed by: INTERNAL MEDICINE

## 2023-11-13 PROCEDURE — 3075F SYST BP GE 130 - 139MM HG: CPT | Performed by: INTERNAL MEDICINE

## 2023-11-13 PROCEDURE — 99214 OFFICE O/P EST MOD 30 MIN: CPT | Performed by: INTERNAL MEDICINE

## 2023-11-13 PROCEDURE — 3051F HG A1C>EQUAL 7.0%<8.0%: CPT | Performed by: INTERNAL MEDICINE

## 2023-11-13 RX ORDER — AMLODIPINE BESYLATE 10 MG/1
10 TABLET ORAL EVERY EVENING
Qty: 90 TABLET | Refills: 3 | Status: SHIPPED | OUTPATIENT
Start: 2023-11-13

## 2023-11-13 RX ORDER — METFORMIN HYDROCHLORIDE 500 MG/1
500 TABLET, EXTENDED RELEASE ORAL
Qty: 90 TABLET | Refills: 1 | Status: SHIPPED | OUTPATIENT
Start: 2023-11-13

## 2023-11-13 ASSESSMENT — ENCOUNTER SYMPTOMS
SORE THROAT: 0
TROUBLE SWALLOWING: 0
EYES NEGATIVE: 1
WHEEZING: 0
DIARRHEA: 0
COUGH: 0
CHEST TIGHTNESS: 0
VOMITING: 0
RHINORRHEA: 0
ALLERGIC/IMMUNOLOGIC NEGATIVE: 1
NAUSEA: 0
CONSTIPATION: 0
SHORTNESS OF BREATH: 0

## 2023-11-13 NOTE — TELEPHONE ENCOUNTER
Medication:   Requested Prescriptions     Pending Prescriptions Disp Refills    amLODIPine (NORVASC) 10 MG tablet [Pharmacy Med Name: amLODIPine BESYLATE 10 MG TAB] 90 tablet 3     Sig: TAKE ONE TABLET BY MOUTH DAILY        Last Filled:      Patient Phone Number: [phone] (home)     Last appt: 10/31/2023   Next appt: 11/13/2023    Last OARRS:        No data to display

## 2023-11-13 NOTE — PROGRESS NOTES
Gio Davis (:  1945) is a 66 y.o. female,Established patient, here for evaluation of the following chief complaint(s):  Urinary Frequency and Generalized Body Aches         ASSESSMENT/PLAN:  1. Urinary urgency chronically off and on for 5 months. Patient feels it is related to when she started pioglitazone 15 mg daily and is also on Farxiga 10 mg daily. Patient is concerned about bladder cancer risks with pioglitazone. Medical literature is conflicting stating there may be an increased risk but not definite. Medication discontinued because of patient discomfort. We will check urinalysis and culture if negative will need urological consultation because of discomfort with normal urine. Since symptoms are improving we will see how it goes over the next month without the medications. -     Culture, Urine; Future  -     Urinalysis with Microscopic; Future  2. Type 2 diabetes mellitus with complication, without long-term current use of insulin (HCC) blood sugars are higher in the 200s off of the pioglitazone and Juan Heft we will restart metformin once a day and evaluate in a month. Daughter was present and ask if she could go back on glipizide which she had taken for years and explained that there is a high risk for hypoglycemia with glipizide and her age and it does not have any preventative mechanisms. Pioglitazone slows progression of diabetes and Farxiga to protect the heart and kidneys against diabetes. -     metFORMIN (GLUCOPHAGE-XR) 500 MG extended release tablet; Take 1 tablet by mouth daily (with breakfast), Disp-90 tablet, R-1Normal      Return in about 4 weeks (around 2023) for for awv on or after 23. Subjective   SUBJECTIVE/OBJECTIVE:  Urinary Frequency   This is a recurrent problem. The current episode started more than 1 month ago. The problem occurs intermittently. The problem has been waxing and waning. The quality of the pain is described as aching and burning.

## 2023-11-15 DIAGNOSIS — N30.90 CYSTITIS: Primary | ICD-10-CM

## 2023-11-15 LAB
BACTERIA UR CULT: ABNORMAL
ORGANISM: ABNORMAL

## 2023-11-15 RX ORDER — CEFUROXIME AXETIL 250 MG/1
250 TABLET ORAL 2 TIMES DAILY
Qty: 14 TABLET | Refills: 0 | Status: SHIPPED | OUTPATIENT
Start: 2023-11-15 | End: 2023-11-22

## 2023-11-15 NOTE — RESULT ENCOUNTER NOTE
The urinalysis shows a bladder infection.  The sensitivities will be available tomorrow, so I will know the best antibiotic to treat the infection.

## 2023-11-20 RX ORDER — PIOGLITAZONEHYDROCHLORIDE 15 MG/1
TABLET ORAL
Qty: 90 TABLET | OUTPATIENT
Start: 2023-11-20

## 2023-11-25 DIAGNOSIS — E11.8 TYPE 2 DIABETES MELLITUS WITH COMPLICATION, WITHOUT LONG-TERM CURRENT USE OF INSULIN (HCC): ICD-10-CM

## 2023-11-25 RX ORDER — METFORMIN HYDROCHLORIDE 500 MG/1
500 TABLET, EXTENDED RELEASE ORAL
Qty: 90 TABLET | Refills: 1 | Status: SHIPPED | OUTPATIENT
Start: 2023-11-25

## 2023-11-25 RX ORDER — PIOGLITAZONEHYDROCHLORIDE 15 MG/1
TABLET ORAL
Qty: 90 TABLET | Refills: 3 | Status: SHIPPED | OUTPATIENT
Start: 2023-11-25

## 2023-11-25 NOTE — TELEPHONE ENCOUNTER
Per OV note  11/13/23:      2. Type 2 diabetes mellitus with complication, without long-term current use of insulin (HCC) blood sugars are higher in the 200s off of the pioglitazone and Lisa we will restart metformin once a day and evaluate in a month. Daughter was present and ask if she could go back on glipizide which she had taken for years and explained that there is a high risk for hypoglycemia with glipizide and her age and it does not have any preventative mechanisms. Pioglitazone slows progression of diabetes and Farxiga to protect the heart and kidneys against diabetes. Does this mean you stopped the Pioglitazone?

## 2023-11-28 ENCOUNTER — TELEPHONE (OUTPATIENT)
Dept: INTERNAL MEDICINE CLINIC | Age: 78
End: 2023-11-28

## 2023-11-28 NOTE — TELEPHONE ENCOUNTER
----- Message from Mendy Gonzalez sent at 11/28/2023 12:33 PM EST -----  Subject: Appointment Request    Reason for Call: New Patient/New to Provider Appointment needed: New   Patient Request Appointment    QUESTIONS    Reason for appointment request? Requested Provider unavailable - Todd Espinal     Additional Information for Provider?  He sees her daughter Ana Laura Soto an   son in law Johnna [place]  ---------------------------------------------------------------------------  --------------  [address] [place]  [phone]; OK to leave message on voicemail  ---------------------------------------------------------------------------  --------------  SCRIPT ANSWERS

## 2023-12-01 ENCOUNTER — TELEPHONE (OUTPATIENT)
Dept: PRIMARY CARE CLINIC | Age: 78
End: 2023-12-01

## 2023-12-01 DIAGNOSIS — E11.8 TYPE 2 DIABETES MELLITUS WITH COMPLICATION, WITHOUT LONG-TERM CURRENT USE OF INSULIN (HCC): ICD-10-CM

## 2023-12-01 RX ORDER — METFORMIN HYDROCHLORIDE 500 MG/1
1000 TABLET, EXTENDED RELEASE ORAL
Qty: 180 TABLET | Refills: 1 | Status: SHIPPED | OUTPATIENT
Start: 2023-12-01

## 2023-12-01 NOTE — TELEPHONE ENCOUNTER
----- Message from Moreno Johnson. Showes sent at 12/1/2023  1:42 AM EST -----  Regarding: Non urgent medical question   Contact: [phone]  My blood sugar is a little higher than normal   Then I realized that I was taking 2 metformin a day in past. So I have gone back to taking 2 and my blood sugar is now, more in range. Is that okay?

## 2023-12-01 NOTE — TELEPHONE ENCOUNTER
Let patient know she can take two 500 mg metformin daily since she has normal kidney function. I sent in a new script , since she will run out sooner.

## 2023-12-04 ENCOUNTER — HOSPITAL ENCOUNTER (OUTPATIENT)
Dept: PULMONOLOGY | Age: 78
Discharge: HOME | End: 2023-12-04
Attending: INTERNAL MEDICINE
Payer: MEDICARE

## 2023-12-04 DIAGNOSIS — J43.2 CENTRILOBULAR EMPHYSEMA (HCC): ICD-10-CM

## 2023-12-04 LAB
DLCO %PRED: NORMAL
DLCO PRED: NORMAL
DLCO/VA %PRED: 35 %
DLCO/VA PRED: NORMAL
DLCO/VA: NORMAL
DLCO: NORMAL
EXPIRATORY TIME-POST: NORMAL
EXPIRATORY TIME: NORMAL
FEF 25-75% %CHNG: NORMAL
FEF 25-75% %PRED-POST: NORMAL
FEF 25-75% %PRED-PRE: NORMAL
FEF 25-75% PRED: NORMAL
FEF 25-75%-POST: NORMAL
FEF 25-75%-PRE: NORMAL
FEV1 %PRED-POST: 111 %
FEV1 %PRED-PRE: 111 %
FEV1 PRED: NORMAL
FEV1-POST: NORMAL
FEV1-PRE: NORMAL
FEV1/FVC %PRED-POST: NORMAL
FEV1/FVC %PRED-PRE: NORMAL
FEV1/FVC PRED: NORMAL
FEV1/FVC-POST: NORMAL
FEV1/FVC-PRE: NORMAL
FVC %PRED-POST: 102 L
FVC %PRED-PRE: 102 %
FVC PRED: NORMAL
FVC-POST: NORMAL
FVC-PRE: NORMAL
GAW %PRED: NORMAL
GAW PRED: NORMAL
GAW: NORMAL
IC %PRED: NORMAL
IC PRED: NORMAL
IC: NORMAL
MEP: NORMAL
MIP: NORMAL
MVV %PRED-PRE: NORMAL
MVV PRED: NORMAL
MVV-PRE: NORMAL
PEF %PRED-POST: NORMAL
PEF %PRED-PRE: NORMAL
PEF PRED: NORMAL
PEF%CHNG: NORMAL
PEF-POST: NORMAL
PEF-PRE: NORMAL
RAW %PRED: NORMAL
RAW PRED: NORMAL
RAW: NORMAL
RV %PRED: NORMAL
RV PRED: NORMAL
RV: NORMAL
SVC %PRED: NORMAL
SVC PRED: NORMAL
SVC: NORMAL
TLC %PRED: 72 %
TLC PRED: NORMAL
TLC: NORMAL
VA %PRED: NORMAL
VA PRED: NORMAL
VA: NORMAL
VTG %PRED: NORMAL
VTG PRED: NORMAL
VTG: NORMAL

## 2023-12-04 PROCEDURE — 94640 AIRWAY INHALATION TREATMENT: CPT

## 2023-12-04 PROCEDURE — 94726 PLETHYSMOGRAPHY LUNG VOLUMES: CPT

## 2023-12-04 PROCEDURE — 6370000000 HC RX 637 (ALT 250 FOR IP): Performed by: INTERNAL MEDICINE

## 2023-12-04 PROCEDURE — 94060 EVALUATION OF WHEEZING: CPT

## 2023-12-04 PROCEDURE — 94729 DIFFUSING CAPACITY: CPT

## 2023-12-04 RX ORDER — ALBUTEROL SULFATE 90 UG/1
4 AEROSOL, METERED RESPIRATORY (INHALATION) ONCE
Status: COMPLETED | OUTPATIENT
Start: 2023-12-04 | End: 2023-12-04

## 2023-12-04 RX ADMIN — ALBUTEROL SULFATE 4 PUFF: 90 AEROSOL, METERED RESPIRATORY (INHALATION) at 07:47

## 2023-12-04 ASSESSMENT — PULMONARY FUNCTION TESTS
FEV1_PERCENT_PREDICTED_PRE: 111
FVC_PERCENT_PREDICTED_POST: 102
FVC_PERCENT_PREDICTED_PRE: 102
FEV1_PERCENT_PREDICTED_POST: 111

## 2023-12-05 PROCEDURE — 94726 PLETHYSMOGRAPHY LUNG VOLUMES: CPT | Performed by: INTERNAL MEDICINE

## 2023-12-05 PROCEDURE — 94729 DIFFUSING CAPACITY: CPT | Performed by: INTERNAL MEDICINE

## 2023-12-05 PROCEDURE — 94060 EVALUATION OF WHEEZING: CPT | Performed by: INTERNAL MEDICINE

## 2023-12-05 NOTE — PROCEDURES
Spirometry was acceptable and reproducible by ATS standards      Spirometry/Flow volume loop:  Spirometry and flow volume loops do not show airflow obstruction. FEV1 111%. %There is no bronchodilator response. Lung volumes:  Lung volumes showed mild restriction. Diffusing capacity:  DLCO is reduced, not corrected for hemoglobin. Summary:  Restrictive lung disease in the setting of reduced diffusion capacity cannot rule out interstitial lung disease. FEV1 %Pred-Post   Date Value Ref Range Status   12/04/2023 111 % Final     TLC %Pred   Date Value Ref Range Status   12/04/2023 72 % Final     DLCO/VA %Pred   Date Value Ref Range Status   12/04/2023 35 % Final       PFT data will be scanned into the media tab under this encounter. Please see the scanned data for numerical values.      Lane Saldaña MD  [facility] Pulmonary, Sleep and Critical Care Medicine

## 2023-12-11 ENCOUNTER — HOSPITAL ENCOUNTER (OUTPATIENT)
Dept: CARDIAC REHAB | Age: 78
Setting detail: THERAPIES SERIES
Discharge: HOME | End: 2023-12-11
Payer: MEDICARE

## 2023-12-11 DIAGNOSIS — J43.2 CENTRILOBULAR EMPHYSEMA (HCC): ICD-10-CM

## 2023-12-11 PROCEDURE — 94618 PULMONARY STRESS TESTING: CPT

## 2023-12-11 PROCEDURE — 94618 PULMONARY STRESS TESTING: CPT | Performed by: INTERNAL MEDICINE

## 2023-12-11 NOTE — PROGRESS NOTES
[facility] Cardiopulmonary Rehabilitation    Qualifying Data for Home Oxygen        Resting Oxygen Saturation on Room Air:   93%    Exercise Oxygen Saturation on Room Air:  77%      Resting Oxygen Saturation on Oxygen:  97% on 2L                                                                   97% on 3L      Exercise Oxygen Saturation on Oxygen: 84% on 2L                                                                   88% on 3L (6 minute time limit up)

## 2023-12-11 NOTE — PROCEDURES
Psychiatric Cardiopulmonary Rehabilitation   Six Minute Walk Test    Sharifa Tracey  YOB: 1945  Account Number: [de-identified]  AGE: 66 y.o.     OP test [x]   Pulmonary Rehab PRE []  POST   []    Diagnosis: Centrilobular Emphysema     Referring Physician: Dr. Ellena Meigs    Gender []  male [x] female AGE:78     Height:5 ft 5 in 140 cm    Weight: 234 lbs  106 kg  Blood Pressure 124/60    Medications affecting heart rate:  Breztri Aerosphere 160-9-4.8 MCG/ACT Aero 2 puffs in the AM and PM, Albuterol sulfate  (90 Base) MCG/ACT Inhaler 2 puffs 4 times a day for wheezing      Supplemental O2 during the test []  no [x] yes 3 lpm     [x] continuous []  intermittent    Device : [x] NC []  HFNC    []  oximizer  [] mask      Laps completed: 8 (1 lap = 100 ft)        Baseline (resting) Walking End of Test (recovery)      Heart Rate 78   121  83    BP  124/60   138/62  118/62    Dyspnea  0    2   0 (Liz scale)    Fatigue  1    3   1 (Liz scale)    SpO2  93%   77%     O2 97% on 2L  84% on 2L   O2 97% on 3L  88% on 3L  (6 minute time ended)         Stopped or paused before 6 mins? []  no [x] yes How long? 1 minute 10 seconds    Symptoms at end of exercise:[] angina  [] dizziness  []  hip, leg, calf, back pain       [x]  no complaints []  other    Number of laps: 8 (x 30.48 meters) + final partial lap: 9 meters     Total distance walked in 6 mins: 253 meters    Predicted distance: 268 meters  Percent predicted:94%              [x] normal       [] reduced     Summary: This is an outpatient 6 minute walk test, performed on supplemental oxygen. The patient ambulated 253 meters which is normal-94% predicted. Her, heart rate response was normal, the blood pressure response was normal, oxygen saturations were abnormal as she desaturated . The patient desaturated to 77% while ambulating on room air, and was placed on 3 L of oxygen to keep saturations above 90%.    The degree of symptoms based on the Liz

## 2023-12-13 ENCOUNTER — OFFICE VISIT (OUTPATIENT)
Dept: PULMONOLOGY | Age: 78
End: 2023-12-13
Payer: MEDICARE

## 2023-12-13 VITALS
WEIGHT: 237 LBS | DIASTOLIC BLOOD PRESSURE: 60 MMHG | RESPIRATION RATE: 18 BRPM | HEIGHT: 65 IN | OXYGEN SATURATION: 94 % | HEART RATE: 84 BPM | SYSTOLIC BLOOD PRESSURE: 136 MMHG | BODY MASS INDEX: 39.49 KG/M2 | TEMPERATURE: 97.2 F

## 2023-12-13 DIAGNOSIS — J43.2 CENTRILOBULAR EMPHYSEMA (HCC): Primary | ICD-10-CM

## 2023-12-13 DIAGNOSIS — J96.11 CHRONIC HYPOXEMIC RESPIRATORY FAILURE (HCC): ICD-10-CM

## 2023-12-13 DIAGNOSIS — J96.21 ACUTE AND CHRONIC RESPIRATORY FAILURE WITH HYPOXIA (HCC): ICD-10-CM

## 2023-12-13 PROCEDURE — 3075F SYST BP GE 130 - 139MM HG: CPT | Performed by: INTERNAL MEDICINE

## 2023-12-13 PROCEDURE — 1123F ACP DISCUSS/DSCN MKR DOCD: CPT | Performed by: INTERNAL MEDICINE

## 2023-12-13 PROCEDURE — 3078F DIAST BP <80 MM HG: CPT | Performed by: INTERNAL MEDICINE

## 2023-12-13 PROCEDURE — 99214 OFFICE O/P EST MOD 30 MIN: CPT | Performed by: INTERNAL MEDICINE

## 2023-12-13 ASSESSMENT — ENCOUNTER SYMPTOMS
CHEST TIGHTNESS: 0
COUGH: 0
CHOKING: 0
WHEEZING: 0
SHORTNESS OF BREATH: 1

## 2023-12-13 NOTE — ASSESSMENT & PLAN NOTE
-Out of proportion to CT findings, nonobstructive PFTs  -Echocardiogram has been ordered by PCP I agree to evaluate for pulmonary hypertension she has known diastolic heart failure.  -Will also order a bubble study to be done as this is a somewhat subacute finding

## 2023-12-13 NOTE — PROGRESS NOTES
[address], SLEEP, AND CRITICAL CARE    Magdaleno Perea (:  1945) is a 66 y.o. female,New patient, here for evaluation of the following chief complaint(s):  Follow-up (ce)         ASSESSMENT/PLAN:  1. Centrilobular emphysema ([address])  Assessment & Plan:  -Preserved spirometry. Severely reduced diffusion capacity  -Breztri, albuterol as needed  -Former smoker    Orders:  -     Echo Limited with Bubble Study - Clinic Performed; Future  2. Chronic hypoxemic respiratory failure (HCC)  Assessment & Plan:   -Out of proportion to CT findings, nonobstructive PFTs  -Echocardiogram has been ordered by PCP I agree to evaluate for pulmonary hypertension she has known diastolic heart failure.  -Will also order a bubble study to be done as this is a somewhat subacute finding  Orders:  -     Echo Limited with Bubble Study - Clinic Performed; Future  3. Acute and chronic respiratory failure with hypoxia ([address])  -     Echo Limited with Bubble Study - Clinic Performed; Future        Return in about 3 months (around 3/13/2024). Future Appointments   Date Time Provider [address]   2023  3:00 PM ECHO ROOM 1 [facility] ECHO [facility]   2023 10:20 AM MD Amilcar Huerta RD  [place] - DYD   2024  1:40 PM MD Amilcar Huerta RD  [place] - [place]YD   2024  2:00 PM MD GHADA [facility] [place] - [place]   3/27/2024  9:20 AM [facility]            Subjective   SUBJECTIVE/OBJECTIVE:    Former 30-pack-year smoker quit 40 years ago presents for evaluation of centrilobular emphysema. Had PFTs no evidence of airflow obstruction but severely reduced diffusing capacity. 6-minute walk test showed 3 L oxygen requirement with exertion. She denies coughing or wheezing. She has never had an exacerbation or hospitalization. Review of Systems   Constitutional: Negative. Respiratory:  Positive for shortness of breath.

## 2023-12-15 ENCOUNTER — TELEPHONE (OUTPATIENT)
Dept: PULMONOLOGY | Age: 78
End: 2023-12-15

## 2023-12-15 NOTE — TELEPHONE ENCOUNTER
Jordyn with [facility] called and said they are unable to accept the order form that was most recently faxed due to \"white out on the date at the bottom\" Please complete a new order and fax with most recent ov notes

## 2023-12-25 DIAGNOSIS — E78.00 PURE HYPERCHOLESTEROLEMIA: ICD-10-CM

## 2023-12-26 RX ORDER — SIMVASTATIN 40 MG
TABLET ORAL
Qty: 90 TABLET | Refills: 1 | Status: SHIPPED | OUTPATIENT
Start: 2023-12-26

## 2023-12-26 NOTE — TELEPHONE ENCOUNTER
Medication:   Requested Prescriptions     Pending Prescriptions Disp Refills    simvastatin (ZOCOR) 40 MG tablet [Pharmacy Med Name: SIMVASTATIN 40 MG TABLET] 90 tablet 1     Sig: TAKE ONE TABLET BY MOUTH ONCE NIGHTLY     Last Filled:  # 90 with 1 refill on 6/23/23    Last appt: 11/13/2023   Next appt: 12/27/2023    Last OARRS:        No data to display

## 2023-12-27 ENCOUNTER — TELEPHONE (OUTPATIENT)
Dept: PRIMARY CARE CLINIC | Age: 78
End: 2023-12-27

## 2023-12-27 DIAGNOSIS — U07.1 COVID-19 VIRUS INFECTION: Primary | ICD-10-CM

## 2023-12-27 DIAGNOSIS — E55.9 VITAMIN D DEFICIENCY: ICD-10-CM

## 2023-12-27 RX ORDER — MULTIVIT-MIN/IRON/FOLIC ACID/K 18-600-40
2 CAPSULE ORAL DAILY
Qty: 180 TABLET | Refills: 3 | Status: SHIPPED | OUTPATIENT
Start: 2023-12-27

## 2023-12-27 NOTE — TELEPHONE ENCOUNTER
Patient called in because she fever of 101 and then dropped to the 100 no cough or shortness of breath some congestion and her daughter tested her for COVID and was positive today. She is eating and drinking no report of vomiting. She was instructed to hold her simvastatin for 1 week and Paxlovid sent to pharmacy.

## 2023-12-27 NOTE — TELEPHONE ENCOUNTER
Tell patient to stop taking simvastatin for one week. I sent in paxlovid. Drink plenty of fluids and continue taking the Vitamin D. Go to ER if shortness of breath, not able to eat or drink.

## 2023-12-27 NOTE — TELEPHONE ENCOUNTER
----- Message from Julian Rhys sent at 12/27/2023  9:29 AM EST -----  Subject: Message to Provider    QUESTIONS  Information for Provider? Patient tested positive for Covid 12/27/23 and   she would like to know if there is anything she should take while she has   Covid. Please call and let her know.  ---------------------------------------------------------------------------  --------------  Arash STEWART  [phone]; OK to leave message on voicemail  ---------------------------------------------------------------------------  --------------  SCRIPT ANSWERS  Relationship to Patient? Other/Third Party  Representative Name? Abbi  Is the representative on the Communication Release of Information (YURI)   form in Epic?  Yes

## 2024-01-02 ENCOUNTER — TELEPHONE (OUTPATIENT)
Dept: PRIMARY CARE CLINIC | Age: 79
End: 2024-01-02

## 2024-01-02 NOTE — TELEPHONE ENCOUNTER
----- Message from Ingrid Wong sent at 1/2/2024  9:02 AM EST -----  Subject: Message to Provider    QUESTIONS  Information for Provider? Monica was told to call back after she tested   for Covid today to see if she could come in for her appt @ 1:40. She did   test negative and temp is 98.4. Please call her back.  ---------------------------------------------------------------------------  --------------  CALL BACK INFO  [phone]; OK to leave message on voicemail  ---------------------------------------------------------------------------  --------------  SCRIPT ANSWERS  undefined

## 2024-01-09 ENCOUNTER — OFFICE VISIT (OUTPATIENT)
Dept: PRIMARY CARE CLINIC | Age: 79
End: 2024-01-09
Payer: MEDICARE

## 2024-01-09 VITALS
BODY MASS INDEX: 38.44 KG/M2 | OXYGEN SATURATION: 97 % | SYSTOLIC BLOOD PRESSURE: 136 MMHG | TEMPERATURE: 98.4 F | HEART RATE: 95 BPM | DIASTOLIC BLOOD PRESSURE: 78 MMHG | WEIGHT: 231 LBS

## 2024-01-09 DIAGNOSIS — E11.8 TYPE 2 DIABETES MELLITUS WITH COMPLICATION, WITHOUT LONG-TERM CURRENT USE OF INSULIN (HCC): ICD-10-CM

## 2024-01-09 DIAGNOSIS — E78.2 MIXED HYPERLIPIDEMIA: ICD-10-CM

## 2024-01-09 DIAGNOSIS — I51.89 GRADE II DIASTOLIC DYSFUNCTION: ICD-10-CM

## 2024-01-09 DIAGNOSIS — E11.8 TYPE 2 DIABETES MELLITUS WITH COMPLICATION, WITHOUT LONG-TERM CURRENT USE OF INSULIN (HCC): Primary | ICD-10-CM

## 2024-01-09 DIAGNOSIS — J43.2 CENTRILOBULAR EMPHYSEMA (HCC): ICD-10-CM

## 2024-01-09 LAB
ALBUMIN SERPL-MCNC: 4.6 G/DL (ref 3.4–5)
ANION GAP SERPL CALCULATED.3IONS-SCNC: 14 MMOL/L (ref 3–16)
BUN SERPL-MCNC: 8 MG/DL (ref 7–20)
CALCIUM SERPL-MCNC: 9.9 MG/DL (ref 8.3–10.6)
CHLORIDE SERPL-SCNC: 106 MMOL/L (ref 99–110)
CHOLEST SERPL-MCNC: 180 MG/DL (ref 0–199)
CO2 SERPL-SCNC: 24 MMOL/L (ref 21–32)
CREAT SERPL-MCNC: 0.8 MG/DL (ref 0.6–1.2)
GFR SERPLBLD CREATININE-BSD FMLA CKD-EPI: >60 ML/MIN/{1.73_M2}
GLUCOSE SERPL-MCNC: 113 MG/DL (ref 70–99)
HDLC SERPL-MCNC: 67 MG/DL (ref 40–60)
LDLC SERPL CALC-MCNC: 88 MG/DL
PHOSPHATE SERPL-MCNC: 2.7 MG/DL (ref 2.5–4.9)
POTASSIUM SERPL-SCNC: 4.9 MMOL/L (ref 3.5–5.1)
SODIUM SERPL-SCNC: 144 MMOL/L (ref 136–145)
TRIGL SERPL-MCNC: 126 MG/DL (ref 0–150)
VLDLC SERPL CALC-MCNC: 25 MG/DL

## 2024-01-09 PROCEDURE — 3075F SYST BP GE 130 - 139MM HG: CPT | Performed by: INTERNAL MEDICINE

## 2024-01-09 PROCEDURE — 3078F DIAST BP <80 MM HG: CPT | Performed by: INTERNAL MEDICINE

## 2024-01-09 PROCEDURE — 99214 OFFICE O/P EST MOD 30 MIN: CPT | Performed by: INTERNAL MEDICINE

## 2024-01-09 PROCEDURE — 1123F ACP DISCUSS/DSCN MKR DOCD: CPT | Performed by: INTERNAL MEDICINE

## 2024-01-09 ASSESSMENT — ENCOUNTER SYMPTOMS
CONSTIPATION: 0
EYES NEGATIVE: 1
COUGH: 0
SPUTUM PRODUCTION: 0
HEMOPTYSIS: 0
WHEEZING: 0
TROUBLE SWALLOWING: 0
NAUSEA: 0
DIFFICULTY BREATHING: 0
CHEST TIGHTNESS: 0
HOARSE VOICE: 0
VOMITING: 0
FREQUENT THROAT CLEARING: 0
VISUAL CHANGE: 0
RHINORRHEA: 0
ALLERGIC/IMMUNOLOGIC NEGATIVE: 1
SORE THROAT: 0
DIARRHEA: 0
SHORTNESS OF BREATH: 0

## 2024-01-09 ASSESSMENT — COPD QUESTIONNAIRES: COPD: 1

## 2024-01-09 NOTE — PROGRESS NOTES
Monica Tracey (:  1945) is a 78 y.o. female,Established patient, here for evaluation of the following chief complaint(s):  Discuss Labs         ASSESSMENT/PLAN:  1. Type 2 diabetes mellitus with complication, without long-term current use of insulin (HCC) left diastolic dysfunction grade 2 initially placed on Jardiance 10 mg daily and to avoid hypoglycemia was given pioglitazone 15 mg daily.  Unfortunately patient experienced a lot of polyuria and urinary tract infection with Jardiance so it was discontinued and patient read that pioglitazone can increase risk of bladder cancer although this is not definitively proven but with her discomfort this was also discontinued and she is back on metformin at 1000 mg daily.  Will monitor renal function electrolytes and A1c.  -     Hemoglobin A1C; Future  -     Fructosamine; Future  -     Renal Function Panel; Future  -     Microalbumin / Creatinine Urine Ratio; Future  -     Urinalysis with Microscopic; Future  2. Mixed hyperlipidemia simvastatin dose is 40 mg daily and tolerated without myalgias or weakness.  Will check lipid profile with diabetes the LDL goal is 70 or less.  -     Lipid Panel; Future  3. Centrilobular emphysema (HCC) severe with hypoxemia with exertion.  Recent stress test did not show any myocardial perfusion abnormalities.  Also has grade 2 diastolic dysfunction.  Patient is seeing pulmonary and breathing well with Breztri and pulmonary prescribed oxygen which patient just got and will get further instructions about use with exertion.  She saturates well 97% at rest but quickly desaturates with exertion.  With the O2 she can start an exercise routine to build her stamina.  Once she starts on her oxygen I recommended the [place] walk in place exercise video  4. Grade II diastolic dysfunction  Unfortunately did not tolerate Jardiance and patient did not want to try Farxiga.  ACE and ARB's are contraindicated due to angioedema with

## 2024-01-11 LAB
EST. AVERAGE GLUCOSE BLD GHB EST-MCNC: 148.5 MG/DL
FRUCTOSAMINE SERPL-SCNC: 258 UMOL/L (ref 205–285)
HBA1C MFR BLD: 6.8 %

## 2024-01-25 LAB
BILIRUB UR QL STRIP.AUTO: NEGATIVE
CLARITY UR: CLEAR
COLOR UR: YELLOW
CREAT UR-MCNC: 106.9 MG/DL (ref 28–259)
GLUCOSE UR STRIP.AUTO-MCNC: NEGATIVE MG/DL
HGB UR QL STRIP.AUTO: NEGATIVE
KETONES UR STRIP.AUTO-MCNC: NEGATIVE MG/DL
LEUKOCYTE ESTERASE UR QL STRIP.AUTO: NEGATIVE
MICROALBUMIN UR DL<=1MG/L-MCNC: 2.9 MG/DL
MICROALBUMIN/CREAT UR: 27.1 MG/G (ref 0–30)
NITRITE UR QL STRIP.AUTO: NEGATIVE
PH UR STRIP.AUTO: 6.5 [PH] (ref 5–8)
PROT UR STRIP.AUTO-MCNC: NEGATIVE MG/DL
SP GR UR STRIP.AUTO: 1.01 (ref 1–1.03)
UA DIPSTICK W REFLEX MICRO PNL UR: NORMAL
URN SPEC COLLECT METH UR: NORMAL
UROBILINOGEN UR STRIP-ACNC: 0.2 E.U./DL

## 2024-01-26 NOTE — RESULT ENCOUNTER NOTE
The urine protein is improving and almost normal.  Will continue to work on keeping the blood sugars under control.

## 2024-01-29 ENCOUNTER — PATIENT MESSAGE (OUTPATIENT)
Dept: PULMONOLOGY | Age: 79
End: 2024-01-29

## 2024-01-29 NOTE — TELEPHONE ENCOUNTER
From: Monica Tracey  To: Dr. Jeff Cook  Sent: 1/29/2024 11:39 AM EST  Subject: Medical question     I wanted to start back on my sleep apnea machine   Can you put in a order to [employer]  I turned in my machine in November but since I’m on oxygen I feel I could benefit from using the cpap machine at night

## 2024-02-02 ENCOUNTER — PATIENT MESSAGE (OUTPATIENT)
Dept: PULMONOLOGY | Age: 79
End: 2024-02-02

## 2024-02-05 NOTE — TELEPHONE ENCOUNTER
From: Monica Tracey  To: Dr. Joe Benitez  Sent: 2/2/2024 2:43 PM EST  Subject: Non urgent medical question     I turned in my cpap machine in November   And since I have started on oxygen and the benefits from that   I would like to start back on my sleep apnea machine   [facility] needs a order for me to get the machine   Can you please send in a order for the machine   I can be reached at [phone]

## 2024-02-07 PROBLEM — G47.33 OBSTRUCTIVE SLEEP APNEA SYNDROME: Chronic | Status: ACTIVE | Noted: 2023-07-24

## 2024-02-07 PROBLEM — J43.2 CENTRILOBULAR EMPHYSEMA (HCC): Chronic | Status: ACTIVE | Noted: 2023-01-19

## 2024-03-08 ENCOUNTER — HOSPITAL ENCOUNTER (OUTPATIENT)
Dept: SLEEP CENTER | Age: 79
Discharge: HOME | End: 2024-03-08

## 2024-03-08 DIAGNOSIS — G47.33 OBSTRUCTIVE SLEEP APNEA SYNDROME: Chronic | ICD-10-CM

## 2024-03-13 ENCOUNTER — TELEPHONE (OUTPATIENT)
Dept: PULMONOLOGY | Age: 79
End: 2024-03-13

## 2024-03-14 ENCOUNTER — TELEPHONE (OUTPATIENT)
Dept: PULMONOLOGY | Age: 79
End: 2024-03-14

## 2024-03-26 ENCOUNTER — TELEPHONE (OUTPATIENT)
Dept: PULMONOLOGY | Age: 79
End: 2024-03-26

## 2024-03-27 ENCOUNTER — OFFICE VISIT (OUTPATIENT)
Dept: PULMONOLOGY | Age: 79
End: 2024-03-27
Payer: MEDICARE

## 2024-03-27 VITALS
OXYGEN SATURATION: 92 % | RESPIRATION RATE: 18 BRPM | WEIGHT: 222.8 LBS | HEIGHT: 65 IN | HEART RATE: 76 BPM | DIASTOLIC BLOOD PRESSURE: 70 MMHG | SYSTOLIC BLOOD PRESSURE: 134 MMHG | TEMPERATURE: 97.3 F | BODY MASS INDEX: 37.12 KG/M2

## 2024-03-27 DIAGNOSIS — G47.33 OBSTRUCTIVE SLEEP APNEA SYNDROME: Chronic | ICD-10-CM

## 2024-03-27 DIAGNOSIS — J43.2 CENTRILOBULAR EMPHYSEMA (HCC): Primary | Chronic | ICD-10-CM

## 2024-03-27 DIAGNOSIS — J96.11 CHRONIC HYPOXEMIC RESPIRATORY FAILURE (HCC): ICD-10-CM

## 2024-03-27 PROCEDURE — 1123F ACP DISCUSS/DSCN MKR DOCD: CPT | Performed by: INTERNAL MEDICINE

## 2024-03-27 PROCEDURE — 3078F DIAST BP <80 MM HG: CPT | Performed by: INTERNAL MEDICINE

## 2024-03-27 PROCEDURE — 99213 OFFICE O/P EST LOW 20 MIN: CPT | Performed by: INTERNAL MEDICINE

## 2024-03-27 PROCEDURE — 3075F SYST BP GE 130 - 139MM HG: CPT | Performed by: INTERNAL MEDICINE

## 2024-03-27 ASSESSMENT — ENCOUNTER SYMPTOMS
CHEST TIGHTNESS: 0
WHEEZING: 0
CHOKING: 0
SHORTNESS OF BREATH: 0
COUGH: 0

## 2024-03-27 NOTE — ASSESSMENT & PLAN NOTE
-Monica Tracey continues to use and benefit from supplemental oxygen.  She is certified to wear 3 L with exertion and at night.

## 2024-03-27 NOTE — PROGRESS NOTES
[facility] PULMONARY, SLEEP, AND CRITICAL CARE    Monica Tracey (:  1945) is a 78 y.o. female,New patient, here for evaluation of the following chief complaint(s):  Follow-up (CE)         ASSESSMENT/PLAN:  1. Centrilobular emphysema (HCC)  Assessment & Plan:   Well-controlled, continue current medications  Breztri and albuterol  2. Obstructive sleep apnea syndrome  Assessment & Plan:  -Follows with Dr. Benitez, getting set up on BiPAP  3. Chronic hypoxemic respiratory failure (HCC)  Assessment & Plan:  -Monica Tracey continues to use and benefit from supplemental oxygen.  She is certified to wear 3 L with exertion and at night.          Return in about 6 months (around 2024).  Future Appointments   Date Time Provider Department Center   2024  2:00 PM Tarsha Hernandez MD [address] [facility] [place] - [place]   10/9/2024  9:40 AM Jeff Cook MD  PULM [facility]            Subjective   SUBJECTIVE/OBJECTIVE:    Former 30-pack-year smoker quit 40 years ago presents for follow-up centrilobular emphysema and shortness of breath    Had PFTs no evidence of airflow obstruction but severely reduced diffusing capacity.    6-minute walk test showed 3 L oxygen requirement with exertion.  Wears oxygen at night    Recently diagnosed with sleep apnea started on BiPAP thinks oxygen at night has improved her breathing during the day    She denies coughing or wheezing.  She has never had an exacerbation or hospitalization.        Review of Systems   Constitutional: Negative.    Respiratory:  Negative for cough, choking, chest tightness, shortness of breath and wheezing.    Cardiovascular: Negative.    Psychiatric/Behavioral: Negative.            Objective   Physical Exam     Gen:  No acute distress.   Eyes: EOMI. Anicteric sclera. No conjunctival injection.   ENT: No discharge.External appearance of ears and nose normal.  Neck: Trachea midline. No mass   Resp:  No crackles. No wheezes. No rhonchi. No dullness on

## 2024-04-03 ENCOUNTER — TELEPHONE (OUTPATIENT)
Dept: PULMONOLOGY | Age: 79
End: 2024-04-03

## 2024-04-03 NOTE — PROGRESS NOTES
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) [] CSA (G47.31) [] Apnea (G47.30)   Length of Need: [x] 18 Months [] 99 Months [] Other:    Machine (ALFRED!): [] Respironics Dream Station   2   Auto [x] ResMed AirSense     Auto S11 or S10 (if bilevel) [] Other:     []  CPAP () [x] Bilevel ()   Mode: [] Auto [] Spontaneous    Mode: [x] Auto [] Spontaneous       IPAP max 25 cmH2O  EPAP  min 10 cmH2O  PS 4 cmH2O     Comfort Settings:   - Ramp Pressure: 5 cmH2O                                        - Ramp time: 15 min                                     -  Flex/EPR - 3 full time                                    - For ResMed Bilevel (TiMax-4 sec   TiMin- 0.2 sec)     Humidifier: [x] Heated ()        [x] Water chamber replacement ()/ 1 per 6 months        Mask:   [x] Nasal () /1 per 3 months [x] Full Face () /1 per 3 months   [x] Patient choice -Size and fit mask [x] Patient Choice - Size and fit mask   [] Dispense:  [] Dispense:    [x] Headgear () / 1 per 3 months [x] Headgear () / 1 per 3 months   [x] Replacement Nasal Cushion ()/2 per month [x] Interface Replacement ()/1 per month   [x] Replacement Nasal Pillows ()/2 per month         Tubing: [x] Heated ()/1 per 3 months    [] Standard ()/1 per 3 months [] Other:           Filters: [x] Non-disposable ()/1 per 6 months     [x] Ultra-Fine, Disposable ()/2 per month        Miscellaneous: [] Chin Strap ()/ 1 per 6 months [x] O2 bleed-in:   2    LPM   [] Oximetry on CPAP/Bilevel []  Other:    [x] Modem: ()         Start Order Date: 24    MEDICAL JUSTIFICATION:  I, the undersigned, certify that the above prescribed supplies are medically necessary for this patient’s wellbeing.  In my opinion, the supplies are both reasonable and necessary in reference to accepted standards of medicalpractice in treatment of this patient’s condition.    JULIANA

## 2024-04-03 NOTE — TELEPHONE ENCOUNTER
Pt calling to review HST results from 03/08/2024.  Order to be sent to [facility]. Pt to schedule f/u.

## 2024-04-06 ASSESSMENT — PATIENT HEALTH QUESTIONNAIRE - PHQ9
SUM OF ALL RESPONSES TO PHQ QUESTIONS 1-9: 0
2. FEELING DOWN, DEPRESSED OR HOPELESS: NOT AT ALL
SUM OF ALL RESPONSES TO PHQ QUESTIONS 1-9: 0
SUM OF ALL RESPONSES TO PHQ9 QUESTIONS 1 & 2: 0
SUM OF ALL RESPONSES TO PHQ QUESTIONS 1-9: 0
2. FEELING DOWN, DEPRESSED OR HOPELESS: NOT AT ALL
1. LITTLE INTEREST OR PLEASURE IN DOING THINGS: NOT AT ALL
SUM OF ALL RESPONSES TO PHQ QUESTIONS 1-9: 0
1. LITTLE INTEREST OR PLEASURE IN DOING THINGS: NOT AT ALL
SUM OF ALL RESPONSES TO PHQ9 QUESTIONS 1 & 2: 0

## 2024-04-09 ENCOUNTER — OFFICE VISIT (OUTPATIENT)
Dept: PRIMARY CARE CLINIC | Age: 79
End: 2024-04-09
Payer: MEDICARE

## 2024-04-09 VITALS
DIASTOLIC BLOOD PRESSURE: 61 MMHG | BODY MASS INDEX: 36.69 KG/M2 | TEMPERATURE: 97.5 F | SYSTOLIC BLOOD PRESSURE: 127 MMHG | RESPIRATION RATE: 17 BRPM | OXYGEN SATURATION: 99 % | WEIGHT: 220.2 LBS | HEART RATE: 86 BPM | HEIGHT: 65 IN

## 2024-04-09 DIAGNOSIS — G47.33 OBSTRUCTIVE SLEEP APNEA SYNDROME: Chronic | ICD-10-CM

## 2024-04-09 DIAGNOSIS — E11.8 TYPE 2 DIABETES MELLITUS WITH COMPLICATION, WITHOUT LONG-TERM CURRENT USE OF INSULIN (HCC): Primary | ICD-10-CM

## 2024-04-09 DIAGNOSIS — E78.2 MIXED HYPERLIPIDEMIA: ICD-10-CM

## 2024-04-09 DIAGNOSIS — J43.2 CENTRILOBULAR EMPHYSEMA (HCC): Chronic | ICD-10-CM

## 2024-04-09 LAB — HBA1C MFR BLD: 7.6 %

## 2024-04-09 PROCEDURE — 3074F SYST BP LT 130 MM HG: CPT | Performed by: INTERNAL MEDICINE

## 2024-04-09 PROCEDURE — 1123F ACP DISCUSS/DSCN MKR DOCD: CPT | Performed by: INTERNAL MEDICINE

## 2024-04-09 PROCEDURE — 3078F DIAST BP <80 MM HG: CPT | Performed by: INTERNAL MEDICINE

## 2024-04-09 PROCEDURE — 99214 OFFICE O/P EST MOD 30 MIN: CPT | Performed by: INTERNAL MEDICINE

## 2024-04-09 PROCEDURE — 83036 HEMOGLOBIN GLYCOSYLATED A1C: CPT | Performed by: INTERNAL MEDICINE

## 2024-04-09 PROCEDURE — 3051F HG A1C>EQUAL 7.0%<8.0%: CPT | Performed by: INTERNAL MEDICINE

## 2024-04-09 NOTE — PATIENT INSTRUCTIONS
[facility] Financial Resources*  (Call 211 if need more resources.)     [facility] Financial Assistance  What they offer: Financial assistance programs that are designed to assist you in finding resources that may help pay your hospital bill. Please click on the links below to learn more about the financial assistance programs available within our regions.  Phone Number: [phone]  How to apply for the [facility] Financial Assistance Program:       Option 1: To apply for financial assistance, a patient (or their family or other provider) should fill out the Financial Assistance Application. Copies of the Financial Assistance Application and the FAP may be obtained for free by calling the [facility] Customer Service department at [phone]   Option 2: The Financial Assistance Application and policy may be obtained for free by downloading a copy from the [employer] website:  https://www.[employer]/patient-resources/financial-assistance  [facility] PROFICIO Partnership Program  What they offer: If financial strain is hindering your ability to meet health care needs, the [employer] PROFICIO Partnership Program may be able to help. It provides support to patients facing complex health issues and social barriers. These services are provided at no cost.   Eligible Patients  Adults who are at or below 200% poverty level  Uninsured, underinsured, or those at risk of losing health insurance  You may be eligible to receive:  One-on-one support enrolling in Medicaid, Marketplace health care coverage, financial assistance, and other benefit programs  Short-term or long-term case management to support and help connect you to appropriate health care services and community-based services.   Assistance with Primary care and prescription costs   referrals as a partnering agency with [employer] to support those overcoming homelessness and living in extreme poverty.   How to Contact:   Phone: Main line

## 2024-04-09 NOTE — PROGRESS NOTES
Monica Tracey (:  1945) is a 78 y.o. female,Established patient, here for evaluation of the following chief complaint(s):  3 Month Follow-Up    Hysterectomy, one ovary.   Colonoscopy, history of multiple polyps  13 polyps.. Return in 3 months.  ASSESSMENT/PLAN:  1. Type 2 diabetes mellitus with complication, without long-term current use of insulin (HCC)  Lab Results   Component Value Date    LABA1C 7.6 2024    LABA1C 6.8 2024    LABA1C 7.1 10/09/2023     Lab Results   Component Value Date    MALBCR 27.1 2024    LDLCALC 88 2024    CREATININE 0.8 2024     Not as well controlled as 3 months ago.  Continue metformin at 1000 mg daily work harder on diet and exercise patient sees where she can make a lot of changes where during the past 3 months has not been following her regimen regularly and walks daily.  -     POCT glycosylated hemoglobin (Hb A1C)  2. Centrilobular emphysema (HCC) patient sleeping with home O2 and feeling well.  Using Breztri regularly and not experiencing shortness of breath with ADLs.  Continue.  3. Obstructive sleep apnea syndrome is scheduled for sleep apnea study.  Await results to see how this is contributing to the nighttime hypoxemia.  4. Mixed hyperlipidemia, tolerating simvastatin 40 mg daily well.  No symptoms of TIA or CAD.  Goal is to get LDL below 70.  Continue simvastatin and work on diet and exercise and if not at goal with next lipid consider switching to a high potency statin.  Lab Results   Component Value Date    CHOL 180 2024    TRIG 126 2024    HDL 67 (H) 2024    LDLCALC 88 2024    CHOLHDLRATIO 2.7 2017         Wt Readings from Last 3 Encounters:   24 99.9 kg (220 lb 3.2 oz)   24 101.1 kg (222 lb 12.8 oz)   24 103.5 kg (228 lb 3.2 oz)     Lab Results   Component Value Date    CHOL 180 2024    TRIG 126 2024    HDL 67 (H) 2024    LDLCALC 88 2024    CHOLHDLRATIO 2.7

## 2024-04-19 DIAGNOSIS — J01.01 ACUTE RECURRENT MAXILLARY SINUSITIS: Primary | ICD-10-CM

## 2024-04-19 RX ORDER — AZITHROMYCIN 250 MG/1
TABLET, FILM COATED ORAL
Qty: 6 TABLET | Refills: 0 | Status: SHIPPED | OUTPATIENT
Start: 2024-04-19 | End: 2024-04-29

## 2024-04-29 NOTE — TELEPHONE ENCOUNTER
Medication:   Requested Prescriptions     Pending Prescriptions Disp Refills    blood glucose test strips (ONETOUCH ULTRA) strip [Pharmacy Med Name: ONETOUCH ULTRA TEST STRIP] 100 strip 12     Sig: USE TO TEST TWO TIMES A DAY       Last Filled:      Patient Phone Number: [phone] (home)     Last appt: 4/9/2024   Next appt: Visit date not found    Last Labs DM:   Lab Results   Component Value Date/Time    LABA1C 7.6 04/09/2024 02:32 PM

## 2024-05-01 RX ORDER — BLOOD SUGAR DIAGNOSTIC
STRIP MISCELLANEOUS
Qty: 100 STRIP | Refills: 12 | Status: SHIPPED | OUTPATIENT
Start: 2024-05-01

## 2024-05-20 ENCOUNTER — HOSPITAL ENCOUNTER (OUTPATIENT)
Dept: WOMENS IMAGING | Age: 79
Discharge: HOME | End: 2024-05-20
Payer: MEDICARE

## 2024-05-20 VITALS — HEIGHT: 65 IN | WEIGHT: 220 LBS | BODY MASS INDEX: 36.65 KG/M2

## 2024-05-20 DIAGNOSIS — Z12.31 VISIT FOR SCREENING MAMMOGRAM: ICD-10-CM

## 2024-05-20 PROCEDURE — 77063 BREAST TOMOSYNTHESIS BI: CPT

## 2024-05-23 NOTE — RESULT ENCOUNTER NOTE
Your vitamin D level is low.  Normally the vitamin D level should be above 30.  A level of 50 is ideal.  Vitamin D is important for healthy bones and a healthy immune system.  Low vitamin D levels making you more prone to getting viral infections.  Also a chronically low vitamin D level is a risk factor for breast and colon cancer.

## 2024-06-20 DIAGNOSIS — E11.8 TYPE 2 DIABETES MELLITUS WITH COMPLICATION, WITHOUT LONG-TERM CURRENT USE OF INSULIN (HCC): ICD-10-CM

## 2024-06-20 DIAGNOSIS — E78.00 PURE HYPERCHOLESTEROLEMIA: ICD-10-CM

## 2024-06-20 RX ORDER — SIMVASTATIN 40 MG
TABLET ORAL
Qty: 90 TABLET | Refills: 3 | Status: SHIPPED | OUTPATIENT
Start: 2024-06-20

## 2024-06-20 RX ORDER — METFORMIN HYDROCHLORIDE 500 MG/1
1000 TABLET, EXTENDED RELEASE ORAL
Qty: 180 TABLET | Refills: 3 | Status: SHIPPED | OUTPATIENT
Start: 2024-06-20

## 2024-06-20 NOTE — TELEPHONE ENCOUNTER
Medication:   Requested Prescriptions     Pending Prescriptions Disp Refills    simvastatin (ZOCOR) 40 MG tablet [Pharmacy Med Name: SIMVASTATIN 40 MG TABLET] 90 tablet 1     Sig: TAKE ONE TABLET BY MOUTH ONCE NIGHTLY    metFORMIN (GLUCOPHAGE-XR) 500 MG extended release tablet [Pharmacy Med Name: METFORMIN HCL  MG TABLET] 180 tablet 1     Sig: TAKE 2 TABLETS BY MOUTH DAILY WITH BREAKFAST        Last Filled:      Patient Phone Number: [phone] (home)     Last appt: 4/9/2024   Next appt: Visit date not found    Last OARRS:        No data to display

## 2024-06-25 ENCOUNTER — TELEPHONE (OUTPATIENT)
Dept: PULMONOLOGY | Age: 79
End: 2024-06-25

## 2024-06-25 NOTE — TELEPHONE ENCOUNTER
Omnica is asking that we send an order to [facility] for a POC. She says she has a stationary and POC, and the stationary unit is so loud that she can't hear tv so she wants to get rid of it and have 2 POCs. I told her we could send an order for her, but that I'm not sure of exactly what the insurance will cover. Please send a POC order to [facility]. Thank you!

## 2024-06-27 DIAGNOSIS — N39.41 URGE INCONTINENCE: ICD-10-CM

## 2024-06-27 DIAGNOSIS — N39.41 URGE INCONTINENCE: Primary | ICD-10-CM

## 2024-06-27 LAB
BACTERIA URNS QL MICRO: ABNORMAL /HPF
BILIRUB UR QL STRIP.AUTO: NEGATIVE
CLARITY UR: CLEAR
COLOR UR: YELLOW
EPI CELLS #/AREA URNS AUTO: 0 /HPF (ref 0–5)
GLUCOSE UR STRIP.AUTO-MCNC: NEGATIVE MG/DL
HGB UR QL STRIP.AUTO: NEGATIVE
HYALINE CASTS #/AREA URNS AUTO: 0 /LPF (ref 0–8)
KETONES UR STRIP.AUTO-MCNC: NEGATIVE MG/DL
LEUKOCYTE ESTERASE UR QL STRIP.AUTO: ABNORMAL
NITRITE UR QL STRIP.AUTO: NEGATIVE
PH UR STRIP.AUTO: 6.5 [PH] (ref 5–8)
PROT UR STRIP.AUTO-MCNC: 30 MG/DL
RBC CLUMPS #/AREA URNS AUTO: 1 /HPF (ref 0–4)
SP GR UR STRIP.AUTO: 1.01 (ref 1–1.03)
UA DIPSTICK W REFLEX MICRO PNL UR: YES
URN SPEC COLLECT METH UR: ABNORMAL
UROBILINOGEN UR STRIP-ACNC: 0.2 E.U./DL
WBC #/AREA URNS AUTO: 24 /HPF (ref 0–5)

## 2024-06-29 DIAGNOSIS — N39.0 FREQUENT URINARY TRACT INFECTIONS: Primary | ICD-10-CM

## 2024-06-29 DIAGNOSIS — N30.90 CYSTITIS WITHOUT HEMATURIA: Primary | ICD-10-CM

## 2024-06-29 LAB
BACTERIA UR CULT: ABNORMAL
ORGANISM: ABNORMAL

## 2024-06-29 RX ORDER — CIPROFLOXACIN 500 MG/1
500 TABLET, FILM COATED ORAL 2 TIMES DAILY
Qty: 12 TABLET | Refills: 0 | Status: SHIPPED | OUTPATIENT
Start: 2024-06-29 | End: 2024-06-29

## 2024-06-29 RX ORDER — SULFAMETHOXAZOLE AND TRIMETHOPRIM 800; 160 MG/1; MG/1
1 TABLET ORAL 2 TIMES DAILY
Qty: 14 TABLET | Refills: 0 | Status: SHIPPED | OUTPATIENT
Start: 2024-06-29 | End: 2024-07-06

## 2024-06-29 NOTE — RESULT ENCOUNTER NOTE
The frequent urination is due to a urinary tract infection.  I sent an antibiotic to your pharmacy.  The antibiotic Bactrim can increase the levels of metformin.  Reduce your metformin to 1 tablet a day while you are on the antibiotic.  Drink plenty of water.  I reviewed your chart and you were treated for 3 urinary tract infections in 2023.  I will also refer you to a urologist to see if we can do anything to the amount of urinary tract infections.  Call the number below for an appointment.      Referred To:           Qiana Miller  [address]  [place] [address] Loc/POS:        Phone:   [phone] Phone:    Fax:   [phone] Fax:

## 2024-07-02 ENCOUNTER — TELEPHONE (OUTPATIENT)
Dept: PRIMARY CARE CLINIC | Age: 79
End: 2024-07-02

## 2024-07-02 NOTE — TELEPHONE ENCOUNTER
PT called back to get her urinalysis results.     Adv PT of Dr. Hernandez note and of the referral to urology.     PT adv that one of the UTI's she had last year was the result of one of the diabetes medications she was taken. She couldn't remember the name of it but said that it started with \"P\".     Although, PT took the name & # for the urologist, PT is not sure she will call and schedule with them.

## 2024-07-22 ENCOUNTER — TELEPHONE (OUTPATIENT)
Dept: PULMONOLOGY | Age: 79
End: 2024-07-22

## 2024-07-22 NOTE — TELEPHONE ENCOUNTER
We sent an order to [facility] for a POC late June. Pt said she has never heard anything from [facility] and asked if we could resend the order or follow up on this for her. Please return call

## 2024-07-22 NOTE — TELEPHONE ENCOUNTER
Called Lee Randle  He states pt is already set up  He will call pt and find out what she needs and call me back

## 2024-07-25 ENCOUNTER — TELEPHONE (OUTPATIENT)
Dept: PRIMARY CARE CLINIC | Age: 79
End: 2024-07-25

## 2024-07-25 NOTE — TELEPHONE ENCOUNTER
Patient would like to schedule nurse visit to get just a A1C done so that she can continue with her dental work. It is ok to schedule patient in an hospital slot week of the 29th?        Please advise

## 2024-07-29 ENCOUNTER — NURSE ONLY (OUTPATIENT)
Dept: PRIMARY CARE CLINIC | Age: 79
End: 2024-07-29
Payer: MEDICARE

## 2024-07-29 VITALS — TEMPERATURE: 97.9 F

## 2024-07-29 DIAGNOSIS — E11.8 TYPE 2 DIABETES MELLITUS WITH COMPLICATION, WITHOUT LONG-TERM CURRENT USE OF INSULIN (HCC): Primary | ICD-10-CM

## 2024-07-29 LAB — HBA1C MFR BLD: 6.6 %

## 2024-07-29 PROCEDURE — 99211 OFF/OP EST MAY X REQ PHY/QHP: CPT | Performed by: INTERNAL MEDICINE

## 2024-07-29 PROCEDURE — 83036 HEMOGLOBIN GLYCOSYLATED A1C: CPT | Performed by: INTERNAL MEDICINE

## 2024-10-09 ENCOUNTER — OFFICE VISIT (OUTPATIENT)
Dept: PULMONOLOGY | Age: 79
End: 2024-10-09
Payer: MEDICARE

## 2024-10-09 VITALS
TEMPERATURE: 97.6 F | HEART RATE: 68 BPM | WEIGHT: 217.6 LBS | OXYGEN SATURATION: 96 % | DIASTOLIC BLOOD PRESSURE: 60 MMHG | SYSTOLIC BLOOD PRESSURE: 120 MMHG | HEIGHT: 65 IN | BODY MASS INDEX: 36.25 KG/M2 | RESPIRATION RATE: 18 BRPM

## 2024-10-09 DIAGNOSIS — J43.2 CENTRILOBULAR EMPHYSEMA (HCC): Primary | Chronic | ICD-10-CM

## 2024-10-09 PROCEDURE — 3074F SYST BP LT 130 MM HG: CPT | Performed by: INTERNAL MEDICINE

## 2024-10-09 PROCEDURE — 1123F ACP DISCUSS/DSCN MKR DOCD: CPT | Performed by: INTERNAL MEDICINE

## 2024-10-09 PROCEDURE — 99213 OFFICE O/P EST LOW 20 MIN: CPT | Performed by: INTERNAL MEDICINE

## 2024-10-09 PROCEDURE — 3078F DIAST BP <80 MM HG: CPT | Performed by: INTERNAL MEDICINE

## 2024-10-09 ASSESSMENT — ENCOUNTER SYMPTOMS
CHEST TIGHTNESS: 0
SHORTNESS OF BREATH: 0
WHEEZING: 0
COUGH: 0
CHOKING: 0

## 2024-10-09 NOTE — PROGRESS NOTES
[facility] PULMONARY, SLEEP, AND CRITICAL CARE    Monica Tracey (:  1945) is a 79 y.o. female,New patient, here for evaluation of the following chief complaint(s):  Follow-up      Assessment & Plan   ASSESSMENT/PLAN:  1. Centrilobular emphysema (HCC)  Assessment & Plan:   Chronic, at goal (stable), continue current treatment plan            Return in about 6 months (around 2025).  Future Appointments   Date Time Provider Department Center   10/15/2024  1:40 PM Andra Owusu APRN - CNP  SLEEP MED [facility]   3/5/2025 10:40 AM [facility]            Subjective   SUBJECTIVE/OBJECTIVE:  [id number] smoker quit 40 years ago follow-up emphysema    PFTs with preserved spirometry but severely reduced diffusing capacity.    3 L oxygen requirement with exertion.  Wears oxygen at night    Follows with Dr. Benitez for sleep apnea, wears BiPAP        Review of Systems   Constitutional: Negative.    Respiratory:  Negative for cough, choking, chest tightness, shortness of breath and wheezing.    Cardiovascular: Negative.    Psychiatric/Behavioral: Negative.            Objective   Physical Exam     Gen:  No acute distress.   Eyes: EOMI. Anicteric sclera. No conjunctival injection.   ENT: No discharge.External appearance of ears and nose normal.  Neck: Trachea midline. No mass   Resp:  No crackles. No wheezes. No rhonchi. No dullness on percussion.  CV: Regular rate. Regular rhythm. No murmur or rub. No edema.   Neuro:  no focal neurologic deficit.  Moves all extremities  Psych: Awake and alert, Oriented x 3.  Judgement and insight appropriate.  Mood stable.    I spent 21 minutes on this case today, >50% was face-to-face in discussion of the diagnosis and the coordination of care in regards to the treatment plan.  All questions answered.        An electronic signature was used to authenticate this note.    --Jeff Cook MD

## 2024-10-10 ENCOUNTER — TELEPHONE (OUTPATIENT)
Dept: PRIMARY CARE CLINIC | Age: 79
End: 2024-10-10

## 2024-10-10 NOTE — TELEPHONE ENCOUNTER
Spoke with PT regarding below. PT is now scheduled for her AWV with Dr. Hernandez on 10/22 @ 9:45 am.

## 2024-10-19 SDOH — ECONOMIC STABILITY: FOOD INSECURITY: WITHIN THE PAST 12 MONTHS, THE FOOD YOU BOUGHT JUST DIDN'T LAST AND YOU DIDN'T HAVE MONEY TO GET MORE.: NEVER TRUE

## 2024-10-19 SDOH — ECONOMIC STABILITY: TRANSPORTATION INSECURITY
IN THE PAST 12 MONTHS, HAS LACK OF TRANSPORTATION KEPT YOU FROM MEETINGS, WORK, OR FROM GETTING THINGS NEEDED FOR DAILY LIVING?: NO

## 2024-10-19 SDOH — ECONOMIC STABILITY: INCOME INSECURITY: HOW HARD IS IT FOR YOU TO PAY FOR THE VERY BASICS LIKE FOOD, HOUSING, MEDICAL CARE, AND HEATING?: NOT HARD AT ALL

## 2024-10-19 SDOH — HEALTH STABILITY: PHYSICAL HEALTH: ON AVERAGE, HOW MANY MINUTES DO YOU ENGAGE IN EXERCISE AT THIS LEVEL?: 30 MIN

## 2024-10-19 SDOH — ECONOMIC STABILITY: FOOD INSECURITY: WITHIN THE PAST 12 MONTHS, YOU WORRIED THAT YOUR FOOD WOULD RUN OUT BEFORE YOU GOT MONEY TO BUY MORE.: NEVER TRUE

## 2024-10-19 SDOH — HEALTH STABILITY: PHYSICAL HEALTH: ON AVERAGE, HOW MANY DAYS PER WEEK DO YOU ENGAGE IN MODERATE TO STRENUOUS EXERCISE (LIKE A BRISK WALK)?: 2 DAYS

## 2024-10-19 ASSESSMENT — LIFESTYLE VARIABLES
HOW OFTEN DO YOU HAVE SIX OR MORE DRINKS ON ONE OCCASION: 1
HOW MANY STANDARD DRINKS CONTAINING ALCOHOL DO YOU HAVE ON A TYPICAL DAY: PATIENT DOES NOT DRINK
HOW OFTEN DO YOU HAVE A DRINK CONTAINING ALCOHOL: 1
HOW OFTEN DO YOU HAVE A DRINK CONTAINING ALCOHOL: NEVER
HOW MANY STANDARD DRINKS CONTAINING ALCOHOL DO YOU HAVE ON A TYPICAL DAY: 0

## 2024-10-19 ASSESSMENT — PATIENT HEALTH QUESTIONNAIRE - PHQ9
2. FEELING DOWN, DEPRESSED OR HOPELESS: NOT AT ALL
SUM OF ALL RESPONSES TO PHQ QUESTIONS 1-9: 0
SUM OF ALL RESPONSES TO PHQ QUESTIONS 1-9: 0
SUM OF ALL RESPONSES TO PHQ9 QUESTIONS 1 & 2: 0
SUM OF ALL RESPONSES TO PHQ QUESTIONS 1-9: 0
SUM OF ALL RESPONSES TO PHQ QUESTIONS 1-9: 0
1. LITTLE INTEREST OR PLEASURE IN DOING THINGS: NOT AT ALL

## 2024-10-22 ENCOUNTER — OFFICE VISIT (OUTPATIENT)
Dept: PRIMARY CARE CLINIC | Age: 79
End: 2024-10-22

## 2024-10-22 VITALS
WEIGHT: 216 LBS | HEIGHT: 65 IN | OXYGEN SATURATION: 98 % | SYSTOLIC BLOOD PRESSURE: 144 MMHG | HEART RATE: 70 BPM | DIASTOLIC BLOOD PRESSURE: 70 MMHG | BODY MASS INDEX: 35.99 KG/M2

## 2024-10-22 DIAGNOSIS — E78.2 MIXED HYPERLIPIDEMIA: ICD-10-CM

## 2024-10-22 DIAGNOSIS — E03.9 ACQUIRED HYPOTHYROIDISM: ICD-10-CM

## 2024-10-22 DIAGNOSIS — E11.8 TYPE 2 DIABETES MELLITUS WITH COMPLICATION, WITHOUT LONG-TERM CURRENT USE OF INSULIN (HCC): ICD-10-CM

## 2024-10-22 DIAGNOSIS — I51.89 GRADE II DIASTOLIC DYSFUNCTION: ICD-10-CM

## 2024-10-22 DIAGNOSIS — I10 HYPERTENSION, ESSENTIAL: Chronic | ICD-10-CM

## 2024-10-22 DIAGNOSIS — Z00.00 MEDICARE ANNUAL WELLNESS VISIT, SUBSEQUENT: Primary | ICD-10-CM

## 2024-10-22 LAB
ALBUMIN SERPL-MCNC: 4.4 G/DL (ref 3.4–5)
ALBUMIN/GLOB SERPL: 2.1 {RATIO} (ref 1.1–2.2)
ALP SERPL-CCNC: 108 U/L (ref 40–129)
ALT SERPL-CCNC: 14 U/L (ref 10–40)
ANION GAP SERPL CALCULATED.3IONS-SCNC: 13 MMOL/L (ref 3–16)
AST SERPL-CCNC: 17 U/L (ref 15–37)
BACTERIA URNS QL MICRO: ABNORMAL /HPF
BASOPHILS # BLD: 0.1 K/UL (ref 0–0.2)
BASOPHILS NFR BLD: 0.6 %
BILIRUB SERPL-MCNC: 0.3 MG/DL (ref 0–1)
BILIRUB UR QL STRIP.AUTO: NEGATIVE
BUN SERPL-MCNC: 10 MG/DL (ref 7–20)
CALCIUM SERPL-MCNC: 10.2 MG/DL (ref 8.3–10.6)
CHLORIDE SERPL-SCNC: 107 MMOL/L (ref 99–110)
CHOLEST SERPL-MCNC: 169 MG/DL (ref 0–199)
CLARITY UR: CLEAR
CO2 SERPL-SCNC: 23 MMOL/L (ref 21–32)
COLOR UR: YELLOW
CREAT SERPL-MCNC: 0.8 MG/DL (ref 0.6–1.2)
CREAT UR-MCNC: 83.5 MG/DL (ref 28–259)
DEPRECATED RDW RBC AUTO: 17.5 % (ref 12.4–15.4)
EOSINOPHIL # BLD: 0.1 K/UL (ref 0–0.6)
EOSINOPHIL NFR BLD: 0.7 %
EPI CELLS #/AREA URNS AUTO: 2 /HPF (ref 0–5)
GFR SERPLBLD CREATININE-BSD FMLA CKD-EPI: 75 ML/MIN/{1.73_M2}
GLUCOSE SERPL-MCNC: 116 MG/DL (ref 70–99)
GLUCOSE UR STRIP.AUTO-MCNC: NEGATIVE MG/DL
HCT VFR BLD AUTO: 36.8 % (ref 36–48)
HDLC SERPL-MCNC: 66 MG/DL (ref 40–60)
HGB BLD-MCNC: 12.3 G/DL (ref 12–16)
HGB UR QL STRIP.AUTO: NEGATIVE
HYALINE CASTS #/AREA URNS AUTO: 0 /LPF (ref 0–8)
KETONES UR STRIP.AUTO-MCNC: NEGATIVE MG/DL
LDLC SERPL CALC-MCNC: 85 MG/DL
LEUKOCYTE ESTERASE UR QL STRIP.AUTO: NEGATIVE
LYMPHOCYTES # BLD: 1.4 K/UL (ref 1–5.1)
LYMPHOCYTES NFR BLD: 14.6 %
MCH RBC QN AUTO: 29.5 PG (ref 26–34)
MCHC RBC AUTO-ENTMCNC: 33.5 G/DL (ref 31–36)
MCV RBC AUTO: 88.1 FL (ref 80–100)
MICROALBUMIN UR DL<=1MG/L-MCNC: 3.52 MG/DL
MICROALBUMIN/CREAT UR: 42.2 MG/G (ref 0–30)
MONOCYTES # BLD: 0.7 K/UL (ref 0–1.3)
MONOCYTES NFR BLD: 7.3 %
NEUTROPHILS # BLD: 7.4 K/UL (ref 1.7–7.7)
NEUTROPHILS NFR BLD: 76.8 %
NITRITE UR QL STRIP.AUTO: NEGATIVE
PH UR STRIP.AUTO: 7.5 [PH] (ref 5–8)
PLATELET # BLD AUTO: 301 K/UL (ref 135–450)
PMV BLD AUTO: 9.3 FL (ref 5–10.5)
POTASSIUM SERPL-SCNC: 4.2 MMOL/L (ref 3.5–5.1)
PROT SERPL-MCNC: 6.5 G/DL (ref 6.4–8.2)
PROT UR STRIP.AUTO-MCNC: ABNORMAL MG/DL
RBC # BLD AUTO: 4.18 M/UL (ref 4–5.2)
RBC CLUMPS #/AREA URNS AUTO: 0 /HPF (ref 0–4)
SODIUM SERPL-SCNC: 143 MMOL/L (ref 136–145)
SP GR UR STRIP.AUTO: 1.01 (ref 1–1.03)
TRIGL SERPL-MCNC: 92 MG/DL (ref 0–150)
TSH SERPL DL<=0.005 MIU/L-ACNC: 2.27 UIU/ML (ref 0.27–4.2)
UA DIPSTICK W REFLEX MICRO PNL UR: YES
URN SPEC COLLECT METH UR: ABNORMAL
UROBILINOGEN UR STRIP-ACNC: 0.2 E.U./DL
VLDLC SERPL CALC-MCNC: 18 MG/DL
WBC # BLD AUTO: 9.6 K/UL (ref 4–11)
WBC #/AREA URNS AUTO: 1 /HPF (ref 0–5)

## 2024-10-22 NOTE — ACP (ADVANCE CARE PLANNING)
Advance Care Planning     Advance Care Planning (ACP) Physician/NP/PA Conversation    Date of Conversation: 10/22/2024  Conducted with: Patient with Decision Making Capacity    Healthcare Decision Maker:      Primary Decision Maker (Active): Abbi Tracey - Child - [phone]    Supplemental (Other) Decision Maker: Jenn Whitney - Child - [phone]    Click here to complete Healthcare Decision Makers including selection of the Healthcare Decision Maker Relationship (ie \"Primary\")  Today we documented Decision Maker(s) consistent with Legal Next of Kin hierarchy.    Care Preferences:    Hospitalization:  \"If your health worsens and it becomes clear that your chance of recovery is unlikely, what would be your preference regarding hospitalization?\"  The patient would prefer hospitalization.    Ventilation:  \"If you were unable to breath on your own and your chance of recovery was unlikely, what would be your preference about the use of a ventilator (breathing machine) if it was available to you?\"  The patient would desire the use of a ventilator. and try to see if helps but no long term    Resuscitation:  \"In the event your heart stopped as a result of an underlying serious health condition, would you want attempts made to restart your heart, or would you prefer a natural death?\"  Yes, attempt to resuscitate.    treatment goals  return to baseline.    Conversation Outcomes / Follow-Up Plan:  ACP complete - no further action today  Reviewed DNR/DNI and patient elects Full Code (Attempt Resuscitation)    Length of Voluntary ACP Conversation in minutes:  <16 minutes (Non-Billable)    RUDY PALMER MD

## 2024-10-22 NOTE — PROGRESS NOTES
Monica Tracey (:  1945) is a 79 y.o. female,Established patient, here for evaluation of the following chief complaint(s):  Medicare AWV    Lab Results   Component Value Date    LABA1C 6.6 2024    LABA1C 7.6 2024    LABA1C 6.8 2024     Lab Results   Component Value Date    MALBCR 42.2 (H) 10/22/2024    CREATININE 0.8 10/22/2024       Assessment & Plan   ASSESSMENT/PLAN:  1. Medicare annual wellness visit, subsequent  2. Acquired hypothyroidism  -     TSH with Reflex to FT4; Future  3. Hypertension, essential  -     Comprehensive Metabolic Panel; Future  -     CBC with Auto Differential; Future  4. Grade II diastolic dysfunction  5. Type 2 diabetes mellitus with complication, without long-term current use of insulin (HCC)  -     Fructosamine; Future  -     Microalbumin / Creatinine Urine Ratio; Future  -     Urinalysis with Microscopic; Future  6. Mixed hyperlipidemia  -     Lipid Panel; Future  Hiren with cpap    Return in 3 months (on 2025).         Subjective   SUBJECTIVE/OBJECTIVE:  HPI    Review of Systems       Objective   Physical Exam  Constitutional:       General: She is not in acute distress.     Appearance: Normal appearance. She is not ill-appearing, toxic-appearing or diaphoretic.   HENT:      Head: Normocephalic and atraumatic.      Nose: Nose normal.   Eyes:      Extraocular Movements: Extraocular movements intact.      Pupils: Pupils are equal, round, and reactive to light.   Neck:      Vascular: No carotid bruit.   Cardiovascular:      Rate and Rhythm: Normal rate and regular rhythm.      Pulses: Normal pulses.      Heart sounds: Normal heart sounds.   Pulmonary:      Breath sounds: No wheezing or rales.   Chest:      Chest wall: No tenderness.   Abdominal:      General: Abdomen is flat. Bowel sounds are normal. There is no distension.      Palpations: Abdomen is soft. There is no mass.      Tenderness: There is no abdominal tenderness. There is no right CVA

## 2024-10-22 NOTE — PATIENT INSTRUCTIONS
adapted under license by [employer]. If you have questions about a medical condition or this instruction, always ask your healthcare professional. Healthwise, [facility] disclaims any warranty or liability for your use of this information.           Eating Healthy Foods: Care Instructions  With every meal, you can make healthy food choices. Try to eat a variety of fruits, vegetables, whole grains, lean proteins, and low-fat dairy products. This can help you get the right balance of nutrients, including vitamins and minerals. Small changes add up over time. You can start by adding one healthy food to your meals each day.    Try to make half your plate fruits and vegetables, one-fourth whole grains, and one-fourth lean proteins. Try including dairy with your meals.   Eat more fruits and vegetables. Try to have them with most meals and snacks.   Foods for healthy eating        Fruits   These can be fresh, frozen, canned, or dried.  Try to choose whole fruit rather than fruit juice.  Eat a variety of colors.        Vegetables   These can be fresh, frozen, canned, or dried.  Beans, peas, and lentils count too.        Whole grains   Choose whole-grain breads, cereals, and noodles.  Try brown rice.        Lean proteins   These can include lean meat, poultry, fish, and eggs.  You can also have tofu, beans, peas, lentils, nuts, and seeds.        Dairy   Try milk, yogurt, and cheese.  Choose low-fat or fat-free when you can.  If you need to, use lactose-free milk or fortified plant-based milk products, such as soy milk.        Water   Drink water when you're thirsty.  Limit sugar-sweetened drinks, including soda, fruit drinks, and sports drinks.  Where can you learn more?  Go to https://www.[employer].net/patientEd and enter T756 to learn more about \"Eating Healthy Foods: Care Instructions.\"  Current as of: September 20, 2023  Content Version: 14.2  © 2024 [facility] [employer].   Care instructions adapted under license

## 2024-10-24 LAB — FRUCTOSAMINE SERPL-SCNC: 242 UMOL/L (ref 205–285)

## 2024-10-25 NOTE — RESULT ENCOUNTER NOTE
There is a mild elevation of protein in the urine but better than a year ago.  The urinalysis was normal with no indication of infection.  The cholesterol continues to improve.  The kidney blood test was normal.  Normal liver blood test.  There was no anemia.  Normal thyroid blood test and the blood sugar has been in the normal range during the past 3 weeks. Yes

## 2024-11-03 DIAGNOSIS — E03.9 ACQUIRED HYPOTHYROIDISM: ICD-10-CM

## 2024-11-04 NOTE — TELEPHONE ENCOUNTER
Medication:   Requested Prescriptions     Pending Prescriptions Disp Refills    levothyroxine (SYNTHROID) 25 MCG tablet [Pharmacy Med Name: LEVOTHYROXINE 25 MCG TABLET] 90 tablet 3     Sig: TAKE 1 TABLET BY MOUTH DAILY       Last Filled:      Patient Phone Number: [phone] (home)     Last appt: 10/22/2024   Next appt: Visit date not found    Last Thyroid:   Lab Results   Component Value Date/Time    TSH 4.56 09/27/2022 10:52 AM    T4FREE 0.8 09/27/2022 10:52 AM

## 2024-11-05 RX ORDER — LEVOTHYROXINE SODIUM 25 UG/1
25 TABLET ORAL DAILY
Qty: 90 TABLET | Refills: 3 | Status: SHIPPED | OUTPATIENT
Start: 2024-11-05

## 2024-11-07 NOTE — TELEPHONE ENCOUNTER
Medication:   Requested Prescriptions     Pending Prescriptions Disp Refills    amLODIPine (NORVASC) 10 MG tablet [Pharmacy Med Name: amLODIPine BESYLATE 10MG TAB] 90 tablet 3     Sig: TAKE ONE TABLET BY MOUTH EVERY EVENING        Last Filled:      Patient Phone Number: [phone] (home)     Last appt: 10/22/2024   Next appt: Visit date not found    Last OARRS:        No data to display

## 2024-11-08 RX ORDER — AMLODIPINE BESYLATE 10 MG/1
10 TABLET ORAL NIGHTLY
Qty: 90 TABLET | Refills: 3 | Status: SHIPPED | OUTPATIENT
Start: 2024-11-08

## 2025-01-15 ENCOUNTER — TELEPHONE (OUTPATIENT)
Dept: PULMONOLOGY | Age: 80
End: 2025-01-15

## 2025-01-16 DIAGNOSIS — E11.8 TYPE 2 DIABETES MELLITUS WITH COMPLICATION, WITHOUT LONG-TERM CURRENT USE OF INSULIN (HCC): Primary | ICD-10-CM

## 2025-01-16 DIAGNOSIS — R30.0 DYSURIA: Primary | ICD-10-CM

## 2025-01-17 DIAGNOSIS — J43.2 CENTRILOBULAR EMPHYSEMA (HCC): ICD-10-CM

## 2025-01-17 DIAGNOSIS — R06.09 DOE (DYSPNEA ON EXERTION): ICD-10-CM

## 2025-01-17 DIAGNOSIS — E11.8 TYPE 2 DIABETES MELLITUS WITH COMPLICATION, WITHOUT LONG-TERM CURRENT USE OF INSULIN (HCC): ICD-10-CM

## 2025-01-17 DIAGNOSIS — R30.0 DYSURIA: ICD-10-CM

## 2025-01-18 LAB
ALBUMIN SERPL-MCNC: 4.7 G/DL (ref 3.4–5)
ANION GAP SERPL CALCULATED.3IONS-SCNC: 15 MMOL/L (ref 3–16)
BACTERIA URNS QL MICRO: ABNORMAL /HPF
BILIRUB UR QL STRIP.AUTO: NEGATIVE
BUN SERPL-MCNC: 10 MG/DL (ref 7–20)
CALCIUM SERPL-MCNC: 10.5 MG/DL (ref 8.3–10.6)
CHLORIDE SERPL-SCNC: 108 MMOL/L (ref 99–110)
CLARITY UR: CLEAR
CO2 SERPL-SCNC: 21 MMOL/L (ref 21–32)
COLOR UR: YELLOW
CREAT SERPL-MCNC: 0.9 MG/DL (ref 0.6–1.2)
CREAT UR-MCNC: 33 MG/DL (ref 28–259)
EPI CELLS #/AREA URNS AUTO: 0 /HPF (ref 0–5)
EST. AVERAGE GLUCOSE BLD GHB EST-MCNC: 139.9 MG/DL
GFR SERPLBLD CREATININE-BSD FMLA CKD-EPI: 65 ML/MIN/{1.73_M2}
GLUCOSE SERPL-MCNC: 136 MG/DL (ref 70–99)
GLUCOSE UR STRIP.AUTO-MCNC: NEGATIVE MG/DL
HBA1C MFR BLD: 6.5 %
HGB UR QL STRIP.AUTO: NEGATIVE
HYALINE CASTS #/AREA URNS AUTO: 0 /LPF (ref 0–8)
KETONES UR STRIP.AUTO-MCNC: NEGATIVE MG/DL
LEUKOCYTE ESTERASE UR QL STRIP.AUTO: ABNORMAL
MICROALBUMIN UR DL<=1MG/L-MCNC: 8.71 MG/DL
MICROALBUMIN/CREAT UR: 263.9 MG/G (ref 0–30)
NITRITE UR QL STRIP.AUTO: NEGATIVE
PH UR STRIP.AUTO: 7 [PH] (ref 5–8)
PHOSPHATE SERPL-MCNC: 2.9 MG/DL (ref 2.5–4.9)
POTASSIUM SERPL-SCNC: 5 MMOL/L (ref 3.5–5.1)
PROT UR STRIP.AUTO-MCNC: ABNORMAL MG/DL
RBC CLUMPS #/AREA URNS AUTO: 0 /HPF (ref 0–4)
SODIUM SERPL-SCNC: 144 MMOL/L (ref 136–145)
SP GR UR STRIP.AUTO: 1.01 (ref 1–1.03)
UA DIPSTICK W REFLEX MICRO PNL UR: YES
URN SPEC COLLECT METH UR: ABNORMAL
UROBILINOGEN UR STRIP-ACNC: 0.2 E.U./DL
WBC #/AREA URNS AUTO: 43 /HPF (ref 0–5)

## 2025-01-20 LAB
BACTERIA UR CULT: ABNORMAL
ORGANISM: ABNORMAL

## 2025-01-21 ENCOUNTER — PATIENT MESSAGE (OUTPATIENT)
Dept: PRIMARY CARE CLINIC | Age: 80
End: 2025-01-21

## 2025-01-21 DIAGNOSIS — R06.09 DOE (DYSPNEA ON EXERTION): ICD-10-CM

## 2025-01-21 DIAGNOSIS — J43.2 CENTRILOBULAR EMPHYSEMA (HCC): ICD-10-CM

## 2025-01-21 LAB — FRUCTOSAMINE SERPL-SCNC: 289 UMOL/L (ref 205–285)

## 2025-01-21 NOTE — TELEPHONE ENCOUNTER
Medication:   Requested Prescriptions     Pending Prescriptions Disp Refills    Budeson-Glycopyrrol-Formoterol (BREZTRI AEROSPHERE) 160-9-4.8 MCG/ACT AERO 1 each 12     Sig: Inhale 2 puffs into the lungs in the morning and 2 puffs in the evening. Maintenance inhaler.        Last Filled:      Patient Phone Number: [phone] (home)     Last appt: 10/22/2024   Next appt: Visit date not found    Last OARRS:        No data to display                Please advise on pt's lab results.

## 2025-01-22 DIAGNOSIS — J43.2 CENTRILOBULAR EMPHYSEMA (HCC): Chronic | ICD-10-CM

## 2025-01-22 DIAGNOSIS — N30.90 CYSTITIS: Primary | ICD-10-CM

## 2025-01-22 RX ORDER — CEFUROXIME AXETIL 250 MG/1
250 TABLET ORAL 2 TIMES DAILY
Qty: 20 TABLET | Refills: 0 | Status: SHIPPED | OUTPATIENT
Start: 2025-01-22 | End: 2025-02-01

## 2025-01-22 RX ORDER — BUDESONIDE, GLYCOPYRROLATE, AND FORMOTEROL FUMARATE 160; 9; 4.8 UG/1; UG/1; UG/1
2 AEROSOL, METERED RESPIRATORY (INHALATION) EVERY 12 HOURS
Qty: 1 EACH | Refills: 12 | Status: SHIPPED | OUTPATIENT
Start: 2025-01-22

## 2025-01-22 RX ORDER — BUDESONIDE, GLYCOPYRROLATE, AND FORMOTEROL FUMARATE 160; 9; 4.8 UG/1; UG/1; UG/1
AEROSOL, METERED RESPIRATORY (INHALATION)
Qty: 10.7 G | OUTPATIENT
Start: 2025-01-22

## 2025-02-04 ENCOUNTER — APPOINTMENT (OUTPATIENT)
Dept: CT IMAGING | Age: 80
End: 2025-02-04
Payer: MEDICARE

## 2025-02-04 ENCOUNTER — APPOINTMENT (OUTPATIENT)
Dept: GENERAL RADIOLOGY | Age: 80
End: 2025-02-04
Payer: MEDICARE

## 2025-02-04 ENCOUNTER — OFFICE VISIT (OUTPATIENT)
Dept: PRIMARY CARE CLINIC | Age: 80
End: 2025-02-04
Payer: MEDICARE

## 2025-02-04 ENCOUNTER — HOSPITAL ENCOUNTER (INPATIENT)
Age: 80
LOS: 4 days | Discharge: HOME | End: 2025-02-08
Attending: INTERNAL MEDICINE | Admitting: INTERNAL MEDICINE
Payer: MEDICARE

## 2025-02-04 VITALS
SYSTOLIC BLOOD PRESSURE: 138 MMHG | RESPIRATION RATE: 16 BRPM | HEIGHT: 65 IN | WEIGHT: 221 LBS | HEART RATE: 81 BPM | DIASTOLIC BLOOD PRESSURE: 60 MMHG | OXYGEN SATURATION: 90 % | TEMPERATURE: 98.3 F | BODY MASS INDEX: 36.82 KG/M2

## 2025-02-04 DIAGNOSIS — R91.1 LUNG NODULE: ICD-10-CM

## 2025-02-04 DIAGNOSIS — R06.02 SHORTNESS OF BREATH: ICD-10-CM

## 2025-02-04 DIAGNOSIS — J96.01 ACUTE RESPIRATORY FAILURE WITH HYPOXIA: ICD-10-CM

## 2025-02-04 DIAGNOSIS — J44.1 COPD WITH ACUTE EXACERBATION (HCC): Primary | ICD-10-CM

## 2025-02-04 DIAGNOSIS — R06.09 DOE (DYSPNEA ON EXERTION): Primary | ICD-10-CM

## 2025-02-04 PROBLEM — G47.33 OSA (OBSTRUCTIVE SLEEP APNEA): Status: ACTIVE | Noted: 2025-02-04

## 2025-02-04 PROBLEM — J96.11 CHRONIC RESPIRATORY FAILURE WITH HYPOXIA: Status: ACTIVE | Noted: 2025-02-04

## 2025-02-04 LAB
ALBUMIN SERPL-MCNC: 4.2 G/DL (ref 3.4–5)
ALBUMIN SERPL-MCNC: 4.2 G/DL (ref 3.4–5)
ALBUMIN/GLOB SERPL: 1.4 {RATIO} (ref 1.1–2.2)
ALBUMIN/GLOB SERPL: 1.4 {RATIO} (ref 1.1–2.2)
ALP SERPL-CCNC: 104 U/L (ref 40–129)
ALP SERPL-CCNC: 104 U/L (ref 40–129)
ALT SERPL-CCNC: 21 U/L (ref 10–40)
ALT SERPL-CCNC: 21 U/L (ref 10–40)
ANION GAP SERPL CALCULATED.3IONS-SCNC: 10 MMOL/L (ref 3–16)
ANION GAP SERPL CALCULATED.3IONS-SCNC: 10 MMOL/L (ref 3–16)
AST SERPL-CCNC: 18 U/L (ref 15–37)
AST SERPL-CCNC: 18 U/L (ref 15–37)
BASE EXCESS BLDV CALC-SCNC: -1.5 MMOL/L (ref -3–3)
BASE EXCESS BLDV CALC-SCNC: -1.5 MMOL/L (ref -3–3)
BASOPHILS # BLD: 0.1 K/UL (ref 0–0.2)
BASOPHILS # BLD: 0.1 K/UL (ref 0–0.2)
BASOPHILS NFR BLD: 0.7 %
BASOPHILS NFR BLD: 0.7 %
BILIRUB SERPL-MCNC: 0.4 MG/DL (ref 0–1)
BILIRUB SERPL-MCNC: 0.4 MG/DL (ref 0–1)
BILIRUB UR QL STRIP.AUTO: NEGATIVE
BILIRUB UR QL STRIP.AUTO: NEGATIVE
BUN SERPL-MCNC: 8 MG/DL (ref 7–20)
BUN SERPL-MCNC: 8 MG/DL (ref 7–20)
CALCIUM SERPL-MCNC: 9.8 MG/DL (ref 8.3–10.6)
CALCIUM SERPL-MCNC: 9.8 MG/DL (ref 8.3–10.6)
CHLORIDE SERPL-SCNC: 107 MMOL/L (ref 99–110)
CHLORIDE SERPL-SCNC: 107 MMOL/L (ref 99–110)
CLARITY UR: CLEAR
CLARITY UR: CLEAR
CO2 BLDV-SCNC: 58 MMOL/L
CO2 BLDV-SCNC: 58 MMOL/L
CO2 SERPL-SCNC: 23 MMOL/L (ref 21–32)
CO2 SERPL-SCNC: 23 MMOL/L (ref 21–32)
COHGB MFR BLDV: 3.5 % (ref 0–1.5)
COHGB MFR BLDV: 3.5 % (ref 0–1.5)
COLOR UR: YELLOW
COLOR UR: YELLOW
CREAT SERPL-MCNC: 0.7 MG/DL (ref 0.6–1.2)
CREAT SERPL-MCNC: 0.7 MG/DL (ref 0.6–1.2)
DEPRECATED RDW RBC AUTO: 16.4 % (ref 12.4–15.4)
DEPRECATED RDW RBC AUTO: 16.4 % (ref 12.4–15.4)
DO-HGB MFR BLDV: 12 %
DO-HGB MFR BLDV: 12 %
EKG ATRIAL RATE: 66 BPM
EKG ATRIAL RATE: 66 BPM
EKG DIAGNOSIS: NORMAL
EKG DIAGNOSIS: NORMAL
EKG P AXIS: 74 DEGREES
EKG P AXIS: 74 DEGREES
EKG P-R INTERVAL: 164 MS
EKG P-R INTERVAL: 164 MS
EKG Q-T INTERVAL: 392 MS
EKG Q-T INTERVAL: 392 MS
EKG QRS DURATION: 86 MS
EKG QRS DURATION: 86 MS
EKG QTC CALCULATION (BAZETT): 410 MS
EKG QTC CALCULATION (BAZETT): 410 MS
EKG R AXIS: -12 DEGREES
EKG R AXIS: -12 DEGREES
EKG T AXIS: 83 DEGREES
EKG T AXIS: 83 DEGREES
EKG VENTRICULAR RATE: 66 BPM
EKG VENTRICULAR RATE: 66 BPM
EOSINOPHIL # BLD: 0.1 K/UL (ref 0–0.6)
EOSINOPHIL # BLD: 0.1 K/UL (ref 0–0.6)
EOSINOPHIL NFR BLD: 0.8 %
EOSINOPHIL NFR BLD: 0.8 %
FLUAV RNA RESP QL NAA+PROBE: NOT DETECTED
FLUAV RNA RESP QL NAA+PROBE: NOT DETECTED
FLUBV RNA RESP QL NAA+PROBE: NOT DETECTED
FLUBV RNA RESP QL NAA+PROBE: NOT DETECTED
GFR SERPLBLD CREATININE-BSD FMLA CKD-EPI: 87 ML/MIN/{1.73_M2}
GFR SERPLBLD CREATININE-BSD FMLA CKD-EPI: 87 ML/MIN/{1.73_M2}
GLUCOSE SERPL-MCNC: 97 MG/DL (ref 70–99)
GLUCOSE SERPL-MCNC: 97 MG/DL (ref 70–99)
GLUCOSE UR STRIP.AUTO-MCNC: NEGATIVE MG/DL
GLUCOSE UR STRIP.AUTO-MCNC: NEGATIVE MG/DL
HCO3 BLDV-SCNC: 24.5 MMOL/L (ref 23–29)
HCO3 BLDV-SCNC: 24.5 MMOL/L (ref 23–29)
HCT VFR BLD AUTO: 34 % (ref 36–48)
HCT VFR BLD AUTO: 34 % (ref 36–48)
HGB BLD-MCNC: 11.3 G/DL (ref 12–16)
HGB BLD-MCNC: 11.3 G/DL (ref 12–16)
HGB UR QL STRIP.AUTO: NEGATIVE
HGB UR QL STRIP.AUTO: NEGATIVE
KETONES UR STRIP.AUTO-MCNC: NEGATIVE MG/DL
KETONES UR STRIP.AUTO-MCNC: NEGATIVE MG/DL
LEUKOCYTE ESTERASE UR QL STRIP.AUTO: NEGATIVE
LEUKOCYTE ESTERASE UR QL STRIP.AUTO: NEGATIVE
LYMPHOCYTES # BLD: 1.2 K/UL (ref 1–5.1)
LYMPHOCYTES # BLD: 1.2 K/UL (ref 1–5.1)
LYMPHOCYTES NFR BLD: 13.2 %
LYMPHOCYTES NFR BLD: 13.2 %
MCH RBC QN AUTO: 27.3 PG (ref 26–34)
MCH RBC QN AUTO: 27.3 PG (ref 26–34)
MCHC RBC AUTO-ENTMCNC: 33 G/DL (ref 31–36)
MCHC RBC AUTO-ENTMCNC: 33 G/DL (ref 31–36)
MCV RBC AUTO: 82.5 FL (ref 80–100)
MCV RBC AUTO: 82.5 FL (ref 80–100)
METHGB MFR BLDV: 0.5 %
METHGB MFR BLDV: 0.5 %
MONOCYTES # BLD: 0.6 K/UL (ref 0–1.3)
MONOCYTES # BLD: 0.6 K/UL (ref 0–1.3)
MONOCYTES NFR BLD: 6.6 %
MONOCYTES NFR BLD: 6.6 %
NEUTROPHILS # BLD: 6.9 K/UL (ref 1.7–7.7)
NEUTROPHILS # BLD: 6.9 K/UL (ref 1.7–7.7)
NEUTROPHILS NFR BLD: 78.7 %
NEUTROPHILS NFR BLD: 78.7 %
NITRITE UR QL STRIP.AUTO: NEGATIVE
NITRITE UR QL STRIP.AUTO: NEGATIVE
NT-PROBNP SERPL-MCNC: 331 PG/ML (ref 0–449)
NT-PROBNP SERPL-MCNC: 331 PG/ML (ref 0–449)
O2 CT VFR BLDV CALC: 13 VOL %
O2 CT VFR BLDV CALC: 13 VOL %
O2 THERAPY: ABNORMAL
O2 THERAPY: ABNORMAL
PCO2 BLDV: 45.4 MMHG (ref 40–50)
PCO2 BLDV: 45.4 MMHG (ref 40–50)
PH BLDV: 7.34 [PH] (ref 7.35–7.45)
PH BLDV: 7.34 [PH] (ref 7.35–7.45)
PH UR STRIP.AUTO: 6.5 [PH] (ref 5–8)
PH UR STRIP.AUTO: 6.5 [PH] (ref 5–8)
PLATELET # BLD AUTO: 297 K/UL (ref 135–450)
PLATELET # BLD AUTO: 297 K/UL (ref 135–450)
PMV BLD AUTO: 8.2 FL (ref 5–10.5)
PMV BLD AUTO: 8.2 FL (ref 5–10.5)
PO2 BLDV: 60.4 MMHG (ref 25–40)
PO2 BLDV: 60.4 MMHG (ref 25–40)
POTASSIUM SERPL-SCNC: 3.8 MMOL/L (ref 3.5–5.1)
POTASSIUM SERPL-SCNC: 3.8 MMOL/L (ref 3.5–5.1)
PROCALCITONIN SERPL IA-MCNC: 0.1 NG/ML (ref 0–0.15)
PROCALCITONIN SERPL IA-MCNC: 0.1 NG/ML (ref 0–0.15)
PROT SERPL-MCNC: 7.1 G/DL (ref 6.4–8.2)
PROT SERPL-MCNC: 7.1 G/DL (ref 6.4–8.2)
PROT UR STRIP.AUTO-MCNC: NEGATIVE MG/DL
PROT UR STRIP.AUTO-MCNC: NEGATIVE MG/DL
RBC # BLD AUTO: 4.13 M/UL (ref 4–5.2)
RBC # BLD AUTO: 4.13 M/UL (ref 4–5.2)
SAO2 % BLDV: 88 %
SAO2 % BLDV: 88 %
SARS-COV-2 RNA RESP QL NAA+PROBE: NOT DETECTED
SARS-COV-2 RNA RESP QL NAA+PROBE: NOT DETECTED
SODIUM SERPL-SCNC: 140 MMOL/L (ref 136–145)
SODIUM SERPL-SCNC: 140 MMOL/L (ref 136–145)
SP GR UR STRIP.AUTO: 1.01 (ref 1–1.03)
SP GR UR STRIP.AUTO: 1.01 (ref 1–1.03)
TROPONIN, HIGH SENSITIVITY: 13 NG/L (ref 0–14)
TROPONIN, HIGH SENSITIVITY: 13 NG/L (ref 0–14)
UA COMPLETE W REFLEX CULTURE PNL UR: NORMAL
UA COMPLETE W REFLEX CULTURE PNL UR: NORMAL
UA DIPSTICK W REFLEX MICRO PNL UR: NORMAL
UA DIPSTICK W REFLEX MICRO PNL UR: NORMAL
URN SPEC COLLECT METH UR: NORMAL
URN SPEC COLLECT METH UR: NORMAL
UROBILINOGEN UR STRIP-ACNC: 0.2 E.U./DL
UROBILINOGEN UR STRIP-ACNC: 0.2 E.U./DL
WBC # BLD AUTO: 8.8 K/UL (ref 4–11)
WBC # BLD AUTO: 8.8 K/UL (ref 4–11)

## 2025-02-04 PROCEDURE — 83880 ASSAY OF NATRIURETIC PEPTIDE: CPT

## 2025-02-04 PROCEDURE — 93010 ELECTROCARDIOGRAM REPORT: CPT | Performed by: INTERNAL MEDICINE

## 2025-02-04 PROCEDURE — 6370000000 HC RX 637 (ALT 250 FOR IP): Performed by: INTERNAL MEDICINE

## 2025-02-04 PROCEDURE — 6360000002 HC RX W HCPCS: Performed by: INTERNAL MEDICINE

## 2025-02-04 PROCEDURE — 2500000003 HC RX 250 WO HCPCS: Performed by: INTERNAL MEDICINE

## 2025-02-04 PROCEDURE — 94761 N-INVAS EAR/PLS OXIMETRY MLT: CPT

## 2025-02-04 PROCEDURE — 2700000000 HC OXYGEN THERAPY PER DAY

## 2025-02-04 PROCEDURE — 99285 EMERGENCY DEPT VISIT HI MDM: CPT

## 2025-02-04 PROCEDURE — 84145 PROCALCITONIN (PCT): CPT

## 2025-02-04 PROCEDURE — 87636 SARSCOV2 & INF A&B AMP PRB: CPT

## 2025-02-04 PROCEDURE — 94640 AIRWAY INHALATION TREATMENT: CPT

## 2025-02-04 PROCEDURE — 85025 COMPLETE CBC W/AUTO DIFF WBC: CPT

## 2025-02-04 PROCEDURE — 71260 CT THORAX DX C+: CPT

## 2025-02-04 PROCEDURE — 84484 ASSAY OF TROPONIN QUANT: CPT

## 2025-02-04 PROCEDURE — 1200000000 HC SEMI PRIVATE

## 2025-02-04 PROCEDURE — 0202U NFCT DS 22 TRGT SARS-COV-2: CPT

## 2025-02-04 PROCEDURE — 2580000003 HC RX 258: Performed by: INTERNAL MEDICINE

## 2025-02-04 PROCEDURE — 71045 X-RAY EXAM CHEST 1 VIEW: CPT

## 2025-02-04 PROCEDURE — 82803 BLOOD GASES ANY COMBINATION: CPT

## 2025-02-04 PROCEDURE — 93005 ELECTROCARDIOGRAM TRACING: CPT | Performed by: INTERNAL MEDICINE

## 2025-02-04 PROCEDURE — 81003 URINALYSIS AUTO W/O SCOPE: CPT

## 2025-02-04 PROCEDURE — 3075F SYST BP GE 130 - 139MM HG: CPT | Performed by: INTERNAL MEDICINE

## 2025-02-04 PROCEDURE — 1123F ACP DISCUSS/DSCN MKR DOCD: CPT | Performed by: INTERNAL MEDICINE

## 2025-02-04 PROCEDURE — 80053 COMPREHEN METABOLIC PANEL: CPT

## 2025-02-04 PROCEDURE — 6360000004 HC RX CONTRAST MEDICATION: Performed by: INTERNAL MEDICINE

## 2025-02-04 PROCEDURE — 3078F DIAST BP <80 MM HG: CPT | Performed by: INTERNAL MEDICINE

## 2025-02-04 PROCEDURE — 99213 OFFICE O/P EST LOW 20 MIN: CPT | Performed by: INTERNAL MEDICINE

## 2025-02-04 PROCEDURE — 1159F MED LIST DOCD IN RCRD: CPT | Performed by: INTERNAL MEDICINE

## 2025-02-04 RX ORDER — ONDANSETRON 4 MG/1
4 TABLET, ORALLY DISINTEGRATING ORAL EVERY 8 HOURS PRN
Status: DISCONTINUED | OUTPATIENT
Start: 2025-02-04 | End: 2025-02-08 | Stop reason: HOSPADM

## 2025-02-04 RX ORDER — POLYETHYLENE GLYCOL 3350 17 G/17G
17 POWDER, FOR SOLUTION ORAL DAILY PRN
Status: DISCONTINUED | OUTPATIENT
Start: 2025-02-04 | End: 2025-02-08 | Stop reason: HOSPADM

## 2025-02-04 RX ORDER — ENOXAPARIN SODIUM 100 MG/ML
40 INJECTION SUBCUTANEOUS DAILY
Status: DISCONTINUED | OUTPATIENT
Start: 2025-02-05 | End: 2025-02-08 | Stop reason: HOSPADM

## 2025-02-04 RX ORDER — SODIUM CHLORIDE 9 MG/ML
INJECTION, SOLUTION INTRAVENOUS PRN
Status: DISCONTINUED | OUTPATIENT
Start: 2025-02-04 | End: 2025-02-08 | Stop reason: HOSPADM

## 2025-02-04 RX ORDER — METFORMIN HYDROCHLORIDE 500 MG/1
500 TABLET, EXTENDED RELEASE ORAL 2 TIMES DAILY WITH MEALS
Status: ON HOLD | COMMUNITY
End: 2025-02-08 | Stop reason: HOSPADM

## 2025-02-04 RX ORDER — AMLODIPINE BESYLATE 10 MG/1
10 TABLET ORAL DAILY
Status: ON HOLD | COMMUNITY
End: 2025-02-08 | Stop reason: HOSPADM

## 2025-02-04 RX ORDER — SODIUM CHLORIDE 9 MG/ML
INJECTION, SOLUTION INTRAVENOUS CONTINUOUS
Status: ACTIVE | OUTPATIENT
Start: 2025-02-04 | End: 2025-02-05

## 2025-02-04 RX ORDER — SIMVASTATIN 40 MG
40 TABLET ORAL NIGHTLY
COMMUNITY

## 2025-02-04 RX ORDER — ACETAMINOPHEN 650 MG/1
650 SUPPOSITORY RECTAL EVERY 6 HOURS PRN
Status: DISCONTINUED | OUTPATIENT
Start: 2025-02-04 | End: 2025-02-08 | Stop reason: HOSPADM

## 2025-02-04 RX ORDER — POTASSIUM CHLORIDE 1500 MG/1
40 TABLET, EXTENDED RELEASE ORAL PRN
Status: DISCONTINUED | OUTPATIENT
Start: 2025-02-04 | End: 2025-02-08 | Stop reason: HOSPADM

## 2025-02-04 RX ORDER — ARFORMOTEROL TARTRATE 15 UG/2ML
15 SOLUTION RESPIRATORY (INHALATION)
Status: DISCONTINUED | OUTPATIENT
Start: 2025-02-04 | End: 2025-02-08 | Stop reason: HOSPADM

## 2025-02-04 RX ORDER — IOPAMIDOL 755 MG/ML
75 INJECTION, SOLUTION INTRAVASCULAR
Status: COMPLETED | OUTPATIENT
Start: 2025-02-04 | End: 2025-02-04

## 2025-02-04 RX ORDER — SODIUM CHLORIDE 0.9 % (FLUSH) 0.9 %
5-40 SYRINGE (ML) INJECTION PRN
Status: DISCONTINUED | OUTPATIENT
Start: 2025-02-04 | End: 2025-02-08 | Stop reason: HOSPADM

## 2025-02-04 RX ORDER — MAGNESIUM SULFATE IN WATER 40 MG/ML
2000 INJECTION, SOLUTION INTRAVENOUS PRN
Status: DISCONTINUED | OUTPATIENT
Start: 2025-02-04 | End: 2025-02-08 | Stop reason: HOSPADM

## 2025-02-04 RX ORDER — ACETAMINOPHEN 325 MG/1
650 TABLET ORAL EVERY 6 HOURS PRN
Status: DISCONTINUED | OUTPATIENT
Start: 2025-02-04 | End: 2025-02-08 | Stop reason: HOSPADM

## 2025-02-04 RX ORDER — BUDESONIDE, GLYCOPYRROLATE, AND FORMOTEROL FUMARATE 160; 9; 4.8 UG/1; UG/1; UG/1
1 AEROSOL, METERED RESPIRATORY (INHALATION) 2 TIMES DAILY
COMMUNITY

## 2025-02-04 RX ORDER — SODIUM CHLORIDE 0.9 % (FLUSH) 0.9 %
5-40 SYRINGE (ML) INJECTION EVERY 12 HOURS SCHEDULED
Status: DISCONTINUED | OUTPATIENT
Start: 2025-02-04 | End: 2025-02-08 | Stop reason: HOSPADM

## 2025-02-04 RX ORDER — LEVOTHYROXINE SODIUM 25 UG/1
25 TABLET ORAL EVERY MORNING
COMMUNITY

## 2025-02-04 RX ORDER — ONDANSETRON 2 MG/ML
4 INJECTION INTRAMUSCULAR; INTRAVENOUS EVERY 6 HOURS PRN
Status: DISCONTINUED | OUTPATIENT
Start: 2025-02-04 | End: 2025-02-08 | Stop reason: HOSPADM

## 2025-02-04 RX ORDER — IPRATROPIUM BROMIDE AND ALBUTEROL SULFATE 2.5; .5 MG/3ML; MG/3ML
1 SOLUTION RESPIRATORY (INHALATION) ONCE
Status: COMPLETED | OUTPATIENT
Start: 2025-02-04 | End: 2025-02-04

## 2025-02-04 RX ORDER — ALBUTEROL SULFATE 90 UG/1
2 INHALANT RESPIRATORY (INHALATION) EVERY 6 HOURS PRN
Status: ON HOLD | COMMUNITY
End: 2025-02-07

## 2025-02-04 RX ORDER — IPRATROPIUM BROMIDE AND ALBUTEROL SULFATE 2.5; .5 MG/3ML; MG/3ML
1 SOLUTION RESPIRATORY (INHALATION)
Status: DISCONTINUED | OUTPATIENT
Start: 2025-02-04 | End: 2025-02-06

## 2025-02-04 RX ORDER — GUAIFENESIN/DEXTROMETHORPHAN 100-10MG/5
10 SYRUP ORAL EVERY 4 HOURS PRN
Status: DISCONTINUED | OUTPATIENT
Start: 2025-02-04 | End: 2025-02-08 | Stop reason: HOSPADM

## 2025-02-04 RX ORDER — POTASSIUM CHLORIDE 7.45 MG/ML
10 INJECTION INTRAVENOUS PRN
Status: DISCONTINUED | OUTPATIENT
Start: 2025-02-04 | End: 2025-02-08 | Stop reason: HOSPADM

## 2025-02-04 RX ORDER — BUDESONIDE 0.5 MG/2ML
0.5 INHALANT ORAL
Status: DISCONTINUED | OUTPATIENT
Start: 2025-02-04 | End: 2025-02-08 | Stop reason: HOSPADM

## 2025-02-04 RX ADMIN — BUDESONIDE 500 MCG: 0.5 INHALANT RESPIRATORY (INHALATION) at 21:38

## 2025-02-04 RX ADMIN — IPRATROPIUM BROMIDE AND ALBUTEROL SULFATE 1 DOSE: .5; 3 SOLUTION RESPIRATORY (INHALATION) at 21:37

## 2025-02-04 RX ADMIN — SODIUM CHLORIDE, PRESERVATIVE FREE 10 ML: 5 INJECTION INTRAVENOUS at 21:31

## 2025-02-04 RX ADMIN — METHYLPREDNISOLONE SODIUM SUCCINATE 125 MG: 125 INJECTION INTRAMUSCULAR; INTRAVENOUS at 18:33

## 2025-02-04 RX ADMIN — WATER 40 MG: 1 INJECTION INTRAMUSCULAR; INTRAVENOUS; SUBCUTANEOUS at 23:04

## 2025-02-04 RX ADMIN — ARFORMOTEROL TARTRATE 15 MCG: 15 SOLUTION RESPIRATORY (INHALATION) at 21:38

## 2025-02-04 RX ADMIN — AZITHROMYCIN MONOHYDRATE 500 MG: 500 INJECTION, POWDER, LYOPHILIZED, FOR SOLUTION INTRAVENOUS at 21:34

## 2025-02-04 RX ADMIN — SODIUM CHLORIDE: 9 INJECTION, SOLUTION INTRAVENOUS at 21:32

## 2025-02-04 RX ADMIN — IPRATROPIUM BROMIDE AND ALBUTEROL SULFATE 1 DOSE: .5; 3 SOLUTION RESPIRATORY (INHALATION) at 14:13

## 2025-02-04 RX ADMIN — IOPAMIDOL 75 ML: 755 INJECTION, SOLUTION INTRAVENOUS at 15:41

## 2025-02-04 SDOH — ECONOMIC STABILITY: INCOME INSECURITY: IN THE LAST 12 MONTHS, WAS THERE A TIME WHEN YOU WERE NOT ABLE TO PAY THE MORTGAGE OR RENT ON TIME?: NO

## 2025-02-04 SDOH — ECONOMIC STABILITY: FOOD INSECURITY: WITHIN THE PAST 12 MONTHS, YOU WORRIED THAT YOUR FOOD WOULD RUN OUT BEFORE YOU GOT MONEY TO BUY MORE.: NEVER TRUE

## 2025-02-04 SDOH — ECONOMIC STABILITY: FOOD INSECURITY: WITHIN THE PAST 12 MONTHS, THE FOOD YOU BOUGHT JUST DIDN'T LAST AND YOU DIDN'T HAVE MONEY TO GET MORE.: NEVER TRUE

## 2025-02-04 SDOH — ECONOMIC STABILITY: TRANSPORTATION INSECURITY
IN THE PAST 12 MONTHS, HAS THE LACK OF TRANSPORTATION KEPT YOU FROM MEDICAL APPOINTMENTS OR FROM GETTING MEDICATIONS?: NO

## 2025-02-04 ASSESSMENT — PATIENT HEALTH QUESTIONNAIRE - PHQ9
SUM OF ALL RESPONSES TO PHQ QUESTIONS 1-9: 0
SUM OF ALL RESPONSES TO PHQ QUESTIONS 1-9: 0
2. FEELING DOWN, DEPRESSED OR HOPELESS: NOT AT ALL
SUM OF ALL RESPONSES TO PHQ QUESTIONS 1-9: 0
SUM OF ALL RESPONSES TO PHQ9 QUESTIONS 1 & 2: 0
1. LITTLE INTEREST OR PLEASURE IN DOING THINGS: NOT AT ALL
1. LITTLE INTEREST OR PLEASURE IN DOING THINGS: NOT AT ALL
SUM OF ALL RESPONSES TO PHQ9 QUESTIONS 1 & 2: 0
SUM OF ALL RESPONSES TO PHQ QUESTIONS 1-9: 0
2. FEELING DOWN, DEPRESSED OR HOPELESS: NOT AT ALL

## 2025-02-04 ASSESSMENT — ENCOUNTER SYMPTOMS
SHORTNESS OF BREATH: 1
RHINORRHEA: 0
VOMITING: 0
ALLERGIC/IMMUNOLOGIC NEGATIVE: 1
CHEST TIGHTNESS: 0
EYES NEGATIVE: 1
DIARRHEA: 0
SORE THROAT: 0
TROUBLE SWALLOWING: 0
WHEEZING: 0
COUGH: 0
NAUSEA: 0
CONSTIPATION: 0

## 2025-02-04 NOTE — H&P
HOSPITALISTS HISTORY AND PHYSICAL    2/5/2025 1:10 AM    Patient Information:  MARIBELL TRACEY is a 79 y.o. female [id number]  PCP:  No primary care provider on file. (Tel: None )    Chief complaint:    Chief Complaint   Patient presents with    Shortness of Breath     Came by [facility] from Dr office d/t OMEGA for about a week with fever starting last night.         History of Present Illness:  Maribell Tracey is a 79 y.o. female with history of COPD, chronic respiratory failure with hypoxia on 3 L O2 via NC at night, SCOTTIE came to ER with SOB.  Patient had fever to 102 F at home.  Has been SOB for a week.  Worse with exertion.  No LE edema, CP, dysphagia, melena, hematochezia, abdominal pain, HA, dizziness, LH or syncope.  Apparently was recently on Abx for UTI and had some diarrhea.  Otherwise complete ROS is negative unless listed above.      REVIEW OF SYSTEMS:   Pertinent positives as noted in HPI.  All other systems were reviewed and are negative.      Past Medical History:   has no past medical history on file.     Past Surgical History:   has no past surgical history on file.     Medications:  No current facility-administered medications on file prior to encounter.     Current Outpatient Medications on File Prior to Encounter   Medication Sig Dispense Refill    amLODIPine (NORVASC) 10 MG tablet Take 1 tablet by mouth daily      Budeson-Glycopyrrol-Formoterol (BREZTRI AEROSPHERE) 160-9-4.8 MCG/ACT AERO Inhale 1 puff into the lungs 2 times daily      levothyroxine (SYNTHROID) 25 MCG tablet Take 1 tablet by mouth every morning      metFORMIN (GLUCOPHAGE-XR) 500 MG extended release tablet Take 1 tablet by mouth 2 times daily (with meals)      simvastatin (ZOCOR) 40 MG tablet Take 1 tablet by mouth nightly      vitamin D 25 MCG (1000 UT) CAPS Take 1 capsule by mouth daily      albuterol sulfate HFA (VENTOLIN  02:00 PM    CALCIUM 9.8 02/04/2025 02:00 PM    LABGLOM 87 02/04/2025 02:00 PM    GLUCOSE 97 02/04/2025 02:00 PM     CT CHEST PULMONARY EMBOLISM W CONTRAST   Final Result   1. No evidence of pulmonary embolism.   2. Mild cardiomegaly with pulmonary edema and small right pleural effusion.   3. Enlarged main pulmonary artery may be seen with pulmonary arterial   hypertension.   4. Subcarinal lymphadenopathy may be reactive.   5. 4 mm right lower lobe pulmonary nodule.      RECOMMENDATIONS:   Fleischner Society guidelines for follow-up and management of incidentally   detected pulmonary nodules:      Single Solid Nodule:      Nodule size less than 6 mm   In a low-risk patient, no routine follow-up.   In a high-risk patient, optional CT at 12 months.      Nodule size equals 6-8 mm   In a low-risk patient, CT at 6-12 months, then consider CT at 18-24 months.   In a high-risk patient, CT at 6-12 months, then CT at 18-24 months.      Nodule size greater than 8 mm         In a low-risk patient, consider CT at 3 months, PET/CT, or tissue sampling.      In a high-risk patient, consider CT at 3 months, PET/CT, or tissue sampling.      Multiple Solid Nodules:      Nodule size less than 6 mm   In a low-risk patient, no routine follow-up.   In a high-risk patient, optional CT at 12 months.      Nodule size equals 6-8 mm   In a low-risk patient, CT at 3-6 months, then consider CT at 18-24 months.   In a high-risk patient, CT at 3-6 months, then CT at 18-24 months.      Nodule size greater than 8 mm   In a low-risk patient, CT at 3-6 months, then consider CT at 18-24 months.   In a high-risk patient, CT at 3-6 months, then CT at 18-24 months.      - Low risk patients include individuals with minimal or absent history of   smoking and other known risk factors.      - High risk patients include individuals with a history or smoking or known   risk factors.      Radiology 2017 http://pubs.rsna.org/doi/full/10.1148/radiol.6250489491     DISPLAY PLAN FREE TEXT

## 2025-02-04 NOTE — PROGRESS NOTES
Monica Tracey (:  1945) is a 79 y.o. female,Established patient, here for evaluation of the following chief complaint(s):  Breathing Problem, Shortness of Breath, and Fever      Assessment & Plan   ASSESSMENT/PLAN:  1. DUFF (dyspnea on exertion)    At rest and with exertion for the past 2 weeks.  Also nonproductive cough and fever and pain in her right upper back with deep inspiration.  No crackles in the lungs with concern for pneumonia viral versus bacterial blood clots or right heart failure.  Because of hypoxemia with O2 not comfortable treating as outpatient so we will send to the emergency room for splotch.  No follow-ups on file.         Subjective   SUBJECTIVE/OBJECTIVE:  Shortness of Breath  This is a new (Pulmonary emphysema with 2 weeks of fever cough and hypoxemia at rest with O2, 3 L/min per nasal cannula.) problem. Episode onset: Symptoms started 2 weeks ago.  Normally O2 is 90 or above at rest with O2. The problem occurs constantly. The problem has been gradually worsening. Associated symptoms include chest pain and a fever. Pertinent negatives include no ear pain, headaches, leg swelling, rhinorrhea, sore throat, vomiting or wheezing. The symptoms are aggravated by any activity. Associated symptoms comments: Right upper posterior chest pain with deep inspiration. The patient has no known risk factors for DVT/PE. Treatments tried: Breztri 2 puffs twice a day. The treatment provided mild relief. Her past medical history is significant for COPD. There is no history of allergies or a heart failure.     History of Present Illness  The patient is a 79-year-old female who presents with shortness of breath.    She reports experiencing dyspnea for the past 2 weeks, which has been progressively worsening. Her oxygen saturation levels have been consistently low, ranging from 83 to 75 at home, necessitating hospital visits. She is currently on a regimen of 3 liters of oxygen. She also reports a fever

## 2025-02-04 NOTE — ACP (ADVANCE CARE PLANNING)
Advanced Care Planning Note.    Purpose of Encounter: Advanced care planning in light of acute COPD exacerbation  Parties In Attendance: Patient, daughters  Decisional Capacity: Yes  Subjective: Patient is coughing and SOB  Objective: Cr 0.7  Goals of Care Determination: Patient wants full support (CPR, vent, surgery, short HD, no trach or PEG)  Plan:  IV steroids, nebs, IV Abx, PT/OT/SLP  Code Status: Full code   Time spent on Advanced care Plannin minutes  Advanced Care Planning Documents: Completed advanced directives on chart, daughters share the POA.    Abram Benitez MD  2025 5:02 PM

## 2025-02-04 NOTE — ED PROVIDER NOTES
EMERGENCY MEDICINE PROVIDER NOTE    Patient Identification  Pt Name: Monica Tracey  MRN: [id number]  Birthdate 1945  Date of evaluation: 2/4/2025  Provider: RANDA ASHBY DO  PCP: No primary care provider on file.    Chief Complaint  Shortness of Breath (Came by [facility] EMS from Dr office d/t DUFF for about a week with fever starting last night. )      HPI  (History provided by patient)  This is a 79 y.o. female who was brought in by EMS transportation for shortness of breath for the past week.  Patient had a fever last night of 102 °F.  Patient denies nausea and vomiting.  She does have some diarrhea but states she just finished antibiotics for a UTI.  Patient denies chest pain.  Patient states she does have a history of COPD.  She does wear oxygen at night and now she is requiring 4 L of oxygen via nasal cannula due to a room air pulse ox of 68%.    I have reviewed the following nursing documentation:  Allergies: Aspirin    Past medical history: History reviewed. No pertinent past medical history.  Past surgical history: History reviewed. No pertinent surgical history.    Home medications:   Previous Medications    ALBUTEROL SULFATE HFA (VENTOLIN HFA) 108 (90 BASE) MCG/ACT INHALER    Inhale 2 puffs into the lungs every 6 hours as needed for Wheezing    AMLODIPINE (NORVASC) 10 MG TABLET    Take 1 tablet by mouth daily    BUDESON-GLYCOPYRROL-FORMOTEROL (BREZTRI AEROSPHERE) 160-9-4.8 MCG/ACT AERO    Inhale 1 puff into the lungs 2 times daily    LEVOTHYROXINE (SYNTHROID) 25 MCG TABLET    Take 1 tablet by mouth every morning    METFORMIN (GLUCOPHAGE-XR) 500 MG EXTENDED RELEASE TABLET    Take 1 tablet by mouth 2 times daily (with meals)    SIMVASTATIN (ZOCOR) 40 MG TABLET    Take 1 tablet by mouth nightly    VITAMIN D 25 MCG (1000 UT) CAPS    Take 1 capsule by mouth daily       Social history:  reports that she has quit smoking. Her smoking use included cigarettes. She does not have any smokeless tobacco  mmHg    HCO3, Venous 24.5 23.0 - 29.0 mmol/L    Base Excess, Isidro -1.5 -3.0 - 3.0 mmol/L    O2 Sat, Isidro 88 Not Established %    Carboxyhemoglobin 3.5 (H) 0.0 - 1.5 %    MetHgb, Isidro 0.5 <1.5 %    TC02 (Calc), Isidro 58 Not Established mmol/L    O2 Content, Isidro 13 Not Established VOL %    O2 Therapy Unknown     Hemoglobin, Isidro, Reduced 12 %   Comprehensive Metabolic Panel   Result Value Ref Range    Sodium 140 136 - 145 mmol/L    Potassium 3.8 3.5 - 5.1 mmol/L    Chloride 107 99 - 110 mmol/L    CO2 23 21 - 32 mmol/L    Anion Gap 10 3 - 16    Glucose 97 70 - 99 mg/dL    BUN 8 7 - 20 mg/dL    Creatinine 0.7 0.6 - 1.2 mg/dL    Est, Glom Filt Rate 87 >60    Calcium 9.8 8.3 - 10.6 mg/dL    Total Protein 7.1 6.4 - 8.2 g/dL    Albumin 4.2 3.4 - 5.0 g/dL    Albumin/Globulin Ratio 1.4 1.1 - 2.2    Total Bilirubin 0.4 0.0 - 1.0 mg/dL    Alkaline Phosphatase 104 40 - 129 U/L    ALT 21 10 - 40 U/L    AST 18 15 - 37 U/L   CBC with Auto Differential   Result Value Ref Range    WBC 8.8 4.0 - 11.0 K/uL    RBC 4.13 4.00 - 5.20 M/uL    Hemoglobin 11.3 (L) 12.0 - 16.0 g/dL    Hematocrit 34.0 (L) 36.0 - 48.0 %    MCV 82.5 80.0 - 100.0 fL    MCH 27.3 26.0 - 34.0 pg    MCHC 33.0 31.0 - 36.0 g/dL    RDW 16.4 (H) 12.4 - 15.4 %    Platelets 297 135 - 450 K/uL    MPV 8.2 5.0 - 10.5 fL    Neutrophils % 78.7 %    Lymphocytes % 13.2 %    Monocytes % 6.6 %    Eosinophils % 0.8 %    Basophils % 0.7 %    Neutrophils Absolute 6.9 1.7 - 7.7 K/uL    Lymphocytes Absolute 1.2 1.0 - 5.1 K/uL    Monocytes Absolute 0.6 0.0 - 1.3 K/uL    Eosinophils Absolute 0.1 0.0 - 0.6 K/uL    Basophils Absolute 0.1 0.0 - 0.2 K/uL   Procalcitonin   Result Value Ref Range    Procalcitonin 0.10 0.00 - 0.15 ng/mL   Troponin   Result Value Ref Range    Troponin, High Sensitivity 13 0 - 14 ng/L   Urinalysis with Reflex to Culture    Specimen: Urine   Result Value Ref Range    Color, UA Yellow Straw/Yellow    Clarity, UA Clear Clear    Glucose, Ur Negative Negative mg/dL

## 2025-02-05 LAB
ANION GAP SERPL CALCULATED.3IONS-SCNC: 19 MMOL/L (ref 3–16)
ANION GAP SERPL CALCULATED.3IONS-SCNC: 19 MMOL/L (ref 3–16)
BASOPHILS # BLD: 0 K/UL (ref 0–0.2)
BASOPHILS # BLD: 0 K/UL (ref 0–0.2)
BASOPHILS NFR BLD: 0.3 %
BASOPHILS NFR BLD: 0.3 %
BUN SERPL-MCNC: 10 MG/DL (ref 7–20)
BUN SERPL-MCNC: 10 MG/DL (ref 7–20)
CALCIUM SERPL-MCNC: 9.7 MG/DL (ref 8.3–10.6)
CALCIUM SERPL-MCNC: 9.7 MG/DL (ref 8.3–10.6)
CHLORIDE SERPL-SCNC: 108 MMOL/L (ref 99–110)
CHLORIDE SERPL-SCNC: 108 MMOL/L (ref 99–110)
CO2 SERPL-SCNC: 15 MMOL/L (ref 21–32)
CO2 SERPL-SCNC: 15 MMOL/L (ref 21–32)
CREAT SERPL-MCNC: 0.8 MG/DL (ref 0.6–1.2)
CREAT SERPL-MCNC: 0.8 MG/DL (ref 0.6–1.2)
DEPRECATED RDW RBC AUTO: 16.4 % (ref 12.4–15.4)
DEPRECATED RDW RBC AUTO: 16.4 % (ref 12.4–15.4)
EOSINOPHIL # BLD: 0 K/UL (ref 0–0.6)
EOSINOPHIL # BLD: 0 K/UL (ref 0–0.6)
EOSINOPHIL NFR BLD: 0 %
EOSINOPHIL NFR BLD: 0 %
FLUAV RNA RESP QL NAA+PROBE: NOT DETECTED
FLUAV RNA RESP QL NAA+PROBE: NOT DETECTED
FLUBV RNA RESP QL NAA+PROBE: NOT DETECTED
FLUBV RNA RESP QL NAA+PROBE: NOT DETECTED
GFR SERPLBLD CREATININE-BSD FMLA CKD-EPI: 75 ML/MIN/{1.73_M2}
GFR SERPLBLD CREATININE-BSD FMLA CKD-EPI: 75 ML/MIN/{1.73_M2}
GLUCOSE BLD-MCNC: 221 MG/DL (ref 70–99)
GLUCOSE BLD-MCNC: 221 MG/DL (ref 70–99)
GLUCOSE BLD-MCNC: 228 MG/DL (ref 70–99)
GLUCOSE BLD-MCNC: 228 MG/DL (ref 70–99)
GLUCOSE SERPL-MCNC: 245 MG/DL (ref 70–99)
GLUCOSE SERPL-MCNC: 245 MG/DL (ref 70–99)
HCT VFR BLD AUTO: 33.9 % (ref 36–48)
HCT VFR BLD AUTO: 33.9 % (ref 36–48)
HGB BLD-MCNC: 11.1 G/DL (ref 12–16)
HGB BLD-MCNC: 11.1 G/DL (ref 12–16)
LYMPHOCYTES # BLD: 0.5 K/UL (ref 1–5.1)
LYMPHOCYTES # BLD: 0.5 K/UL (ref 1–5.1)
LYMPHOCYTES NFR BLD: 5.1 %
LYMPHOCYTES NFR BLD: 5.1 %
MCH RBC QN AUTO: 27.5 PG (ref 26–34)
MCH RBC QN AUTO: 27.5 PG (ref 26–34)
MCHC RBC AUTO-ENTMCNC: 32.7 G/DL (ref 31–36)
MCHC RBC AUTO-ENTMCNC: 32.7 G/DL (ref 31–36)
MCV RBC AUTO: 84.1 FL (ref 80–100)
MCV RBC AUTO: 84.1 FL (ref 80–100)
MONOCYTES # BLD: 0.1 K/UL (ref 0–1.3)
MONOCYTES # BLD: 0.1 K/UL (ref 0–1.3)
MONOCYTES NFR BLD: 0.9 %
MONOCYTES NFR BLD: 0.9 %
NEUTROPHILS # BLD: 9.2 K/UL (ref 1.7–7.7)
NEUTROPHILS # BLD: 9.2 K/UL (ref 1.7–7.7)
NEUTROPHILS NFR BLD: 93.7 %
NEUTROPHILS NFR BLD: 93.7 %
PERFORMED ON: ABNORMAL
PLATELET # BLD AUTO: 291 K/UL (ref 135–450)
PLATELET # BLD AUTO: 291 K/UL (ref 135–450)
PMV BLD AUTO: 8.3 FL (ref 5–10.5)
PMV BLD AUTO: 8.3 FL (ref 5–10.5)
POTASSIUM SERPL-SCNC: 5 MMOL/L (ref 3.5–5.1)
POTASSIUM SERPL-SCNC: 5 MMOL/L (ref 3.5–5.1)
RBC # BLD AUTO: 4.03 M/UL (ref 4–5.2)
RBC # BLD AUTO: 4.03 M/UL (ref 4–5.2)
REPORT: NORMAL
REPORT: NORMAL
RESP PATH DNA+RNA PNL NPH NAA+NON-PROBE: NORMAL
RESP PATH DNA+RNA PNL NPH NAA+NON-PROBE: NORMAL
SARS-COV-2 RNA RESP QL NAA+PROBE: NOT DETECTED
SARS-COV-2 RNA RESP QL NAA+PROBE: NOT DETECTED
SODIUM SERPL-SCNC: 142 MMOL/L (ref 136–145)
SODIUM SERPL-SCNC: 142 MMOL/L (ref 136–145)
WBC # BLD AUTO: 9.8 K/UL (ref 4–11)
WBC # BLD AUTO: 9.8 K/UL (ref 4–11)

## 2025-02-05 PROCEDURE — 92610 EVALUATE SWALLOWING FUNCTION: CPT

## 2025-02-05 PROCEDURE — 6360000002 HC RX W HCPCS: Performed by: INTERNAL MEDICINE

## 2025-02-05 PROCEDURE — 92526 ORAL FUNCTION THERAPY: CPT

## 2025-02-05 PROCEDURE — 97162 PT EVAL MOD COMPLEX 30 MIN: CPT

## 2025-02-05 PROCEDURE — 94761 N-INVAS EAR/PLS OXIMETRY MLT: CPT

## 2025-02-05 PROCEDURE — 2580000003 HC RX 258: Performed by: INTERNAL MEDICINE

## 2025-02-05 PROCEDURE — 6370000000 HC RX 637 (ALT 250 FOR IP): Performed by: INTERNAL MEDICINE

## 2025-02-05 PROCEDURE — 1200000000 HC SEMI PRIVATE

## 2025-02-05 PROCEDURE — 36415 COLL VENOUS BLD VENIPUNCTURE: CPT

## 2025-02-05 PROCEDURE — 87636 SARSCOV2 & INF A&B AMP PRB: CPT

## 2025-02-05 PROCEDURE — 97116 GAIT TRAINING THERAPY: CPT

## 2025-02-05 PROCEDURE — 97110 THERAPEUTIC EXERCISES: CPT

## 2025-02-05 PROCEDURE — 80048 BASIC METABOLIC PNL TOTAL CA: CPT

## 2025-02-05 PROCEDURE — 2500000003 HC RX 250 WO HCPCS: Performed by: INTERNAL MEDICINE

## 2025-02-05 PROCEDURE — 85025 COMPLETE CBC W/AUTO DIFF WBC: CPT

## 2025-02-05 PROCEDURE — 94640 AIRWAY INHALATION TREATMENT: CPT

## 2025-02-05 PROCEDURE — 2700000000 HC OXYGEN THERAPY PER DAY

## 2025-02-05 RX ORDER — PANTOPRAZOLE SODIUM 40 MG/1
40 TABLET, DELAYED RELEASE ORAL
Status: DISCONTINUED | OUTPATIENT
Start: 2025-02-05 | End: 2025-02-08 | Stop reason: HOSPADM

## 2025-02-05 RX ORDER — MAGNESIUM HYDROXIDE/ALUMINUM HYDROXICE/SIMETHICONE 120; 1200; 1200 MG/30ML; MG/30ML; MG/30ML
30 SUSPENSION ORAL EVERY 6 HOURS PRN
Status: DISCONTINUED | OUTPATIENT
Start: 2025-02-05 | End: 2025-02-08 | Stop reason: HOSPADM

## 2025-02-05 RX ADMIN — IPRATROPIUM BROMIDE AND ALBUTEROL SULFATE 1 DOSE: .5; 3 SOLUTION RESPIRATORY (INHALATION) at 05:08

## 2025-02-05 RX ADMIN — WATER 40 MG: 1 INJECTION INTRAMUSCULAR; INTRAVENOUS; SUBCUTANEOUS at 23:51

## 2025-02-05 RX ADMIN — AZITHROMYCIN MONOHYDRATE 500 MG: 500 INJECTION, POWDER, LYOPHILIZED, FOR SOLUTION INTRAVENOUS at 23:56

## 2025-02-05 RX ADMIN — WATER 40 MG: 1 INJECTION INTRAMUSCULAR; INTRAVENOUS; SUBCUTANEOUS at 12:22

## 2025-02-05 RX ADMIN — IPRATROPIUM BROMIDE AND ALBUTEROL SULFATE 1 DOSE: .5; 3 SOLUTION RESPIRATORY (INHALATION) at 21:42

## 2025-02-05 RX ADMIN — ARFORMOTEROL TARTRATE 15 MCG: 15 SOLUTION RESPIRATORY (INHALATION) at 09:04

## 2025-02-05 RX ADMIN — ARFORMOTEROL TARTRATE 15 MCG: 15 SOLUTION RESPIRATORY (INHALATION) at 21:42

## 2025-02-05 RX ADMIN — SODIUM CHLORIDE: 9 INJECTION, SOLUTION INTRAVENOUS at 12:24

## 2025-02-05 RX ADMIN — ENOXAPARIN SODIUM 40 MG: 100 INJECTION SUBCUTANEOUS at 08:29

## 2025-02-05 RX ADMIN — PANTOPRAZOLE SODIUM 40 MG: 40 TABLET, DELAYED RELEASE ORAL at 05:38

## 2025-02-05 RX ADMIN — SODIUM CHLORIDE, PRESERVATIVE FREE 10 ML: 5 INJECTION INTRAVENOUS at 19:35

## 2025-02-05 RX ADMIN — BUDESONIDE 500 MCG: 0.5 INHALANT RESPIRATORY (INHALATION) at 21:42

## 2025-02-05 RX ADMIN — WATER 40 MG: 1 INJECTION INTRAMUSCULAR; INTRAVENOUS; SUBCUTANEOUS at 05:38

## 2025-02-05 RX ADMIN — IPRATROPIUM BROMIDE AND ALBUTEROL SULFATE 1 DOSE: .5; 3 SOLUTION RESPIRATORY (INHALATION) at 12:57

## 2025-02-05 RX ADMIN — IPRATROPIUM BROMIDE AND ALBUTEROL SULFATE 1 DOSE: .5; 3 SOLUTION RESPIRATORY (INHALATION) at 16:13

## 2025-02-05 RX ADMIN — BUDESONIDE 500 MCG: 0.5 INHALANT RESPIRATORY (INHALATION) at 09:04

## 2025-02-05 RX ADMIN — IPRATROPIUM BROMIDE AND ALBUTEROL SULFATE 1 DOSE: .5; 3 SOLUTION RESPIRATORY (INHALATION) at 00:33

## 2025-02-05 RX ADMIN — WATER 40 MG: 1 INJECTION INTRAMUSCULAR; INTRAVENOUS; SUBCUTANEOUS at 17:13

## 2025-02-05 RX ADMIN — GUAIFENESIN AND DEXTROMETHORPHAN 10 ML: 100; 10 SYRUP ORAL at 05:37

## 2025-02-05 RX ADMIN — IPRATROPIUM BROMIDE AND ALBUTEROL SULFATE 1 DOSE: .5; 3 SOLUTION RESPIRATORY (INHALATION) at 09:04

## 2025-02-05 NOTE — PROGRESS NOTES
Speech Language Pathology  [facility] - Inpatient Rehabilitation Services  [phone]  SLP Clinical Swallow Evaluation       Patient: Monica Tracey   : 1945   MRN: [id number]      Evaluation Date: 2025      Admitting Dx: Lung nodule [R91.1]  COPD with acute exacerbation (HCC) [J44.1]  Acute respiratory failure with hypoxia [J96.01]  Treatment Diagnosis: Oropharyngeal Dysphagia   Pain: Denies                                  Recommendations      Recommended Diet and Intervention 2025:  Diet Solids Recommendation:  Regular texture diet  Liquid Consistency Recommendation:  Thin liquids  Recommended form of Meds: Meds whole with water         Compensatory strategies: Upright as possible with all PO intake , Small bites/sips , Eat/feed slowly, Remain upright 30-45 min     Discharge Recommendations:  No further follow-up appears indicated at this time.     History/Course of Treatment     H&P: Monica Tracey is a 79 y.o. female with history of COPD, chronic respiratory failure with hypoxia on 3 L O2 via NC at night, SCOTTIE came to ER with SOB.  Patient had fever to 102 F at home.  Has been SOB for a week.  Worse with exertion.  No LE edema, CP, dysphagia, melena, hematochezia, abdominal pain, HA, dizziness, LH or syncope.  Apparently was recently on Abx for UTI and had some diarrhea.  Otherwise complete ROS is negative unless listed above.     Imaging:  Chest X-ray:   IMPRESSION:  No acute consolidation.     History/Prior Level of Function:   Living Status: lives in an assisted living facility  Prior Dysphagia History: None per chart or pt report   Reason for referral: SLP evaluation orders received due to dysphagia risk .      Evaluation/Treatment   DYSPHAGIA BEDSIDE SWALLOW EVALUATION   Dysphagia Impressions/Dysphagia Diagnosis: Oropharyngeal Dysphagia   Pt alert and responsive, with appropriate participation in conversation. Positioned Upright in bed . On  4L O2 via nasal cannula  with RR

## 2025-02-05 NOTE — PROGRESS NOTES
[facility] - Inpatient Rehabilitation Department   Phone: [phone]    Physical Therapy    [x] Initial Evaluation            [] Daily Treatment Note         [] Discharge Summary      Patient: Monica Tracey   : 1945   MRN: [id number]   Date of Service:  2025  Admitting Diagnosis: COPD with acute exacerbation (HCC)  Current Admission Summary: Monica Tracey is a 79 y.o. female with history of COPD, chronic respiratory failure with hypoxia on 3 L O2 via NC at night, SCOTTIE came to ER with SOB.  Patient had fever to 102 F at home.  Has been SOB for a week.  Worse with exertion.  No LE edema, CP, dysphagia, melena, hematochezia, abdominal pain, HA, dizziness, LH or syncope.  Apparently was recently on Abx for UTI and had some diarrhea.  Otherwise complete ROS is negative unless listed above.     - Respiratory panel all negative - Flu A, B, Covid  Past Medical History:  has no past medical history on file.  Past Surgical History:  has no past surgical history on file.  Discharge Recommendations: Monica Tarcey scored a 22/24 on the AM-PAC short mobility form. Current research shows that an AM-PAC score of 18 or greater is typically associated with a discharge to the patient's home setting. Based on the patient's AM-PAC score and their current functional mobility deficits, it is recommended that the patient have 2-3 sessions per week of Physical Therapy at d/c to increase the patient's independence.  At this time, this patient demonstrates the endurance and safety to discharge home with HHPT and a follow up treatment frequency of 2-3x/wk.  Please see assessment section for further patient specific details.    If patient discharges prior to next session this note will serve as a discharge summary.  Please see below for the latest assessment towards goals.     DME Required For Discharge: no DME required at discharge  Precautions/Restrictions: low fall risk  Weight Bearing Restrictions: no

## 2025-02-05 NOTE — PROGRESS NOTES
Patient ambulating to bathroom on 4L NC , O2 dropped to 48% when returning back to bed . Patient recovered in 2 minutes with O2 at 95% back on 4L

## 2025-02-05 NOTE — ED NOTES
Patient Name: Monica Tracey  : 1945 79 y.o.  MRN: [id number]  ED Room #: ED-0015/15     Chief complaint:   Chief Complaint   Patient presents with    Shortness of Breath     Came by [facility] EMS from Dr office d/t OMEGA for about a week with fever starting last night.      Hospital Problem/Diagnosis:   Hospital Problems             Last Modified POA    * (Principal) COPD with acute exacerbation (HCC) 2025 Yes    Chronic respiratory failure with hypoxia 2025 Yes    SCOTTIE (obstructive sleep apnea) 2025 Yes         O2 Flow Rate:O2 Device: Nasal cannula O2 Flow Rate (L/min): 4 L/min (if applicable)  Cardiac Rhythm:   (if applicable)  Active LDA's:   Peripheral IV 25 Right Antecubital (Active)            How does patient ambulate? Contact Guard    2. How does patient take pills? Whole with Water    3. Is patient alert? Alert    4. Is patient oriented? To Person, To Place, To Time, To Situation, and Follows Commands    5.   Patient arrived from:  home  Facility Name: ___________________________________________    6. If patient is disoriented or from a Skill Nursing Facility has family been notified of admission?  N/A    7. Patient belongings? Belongings: Cell Phone and WHITE     Disposition of belongings? Kept with Patient     8. Any specific patient or family belongings/needs/dynamics?   a. N/A    9. Miscellaneous comments/pending orders?  a. N/A      If there are any additional questions please reach out to the Emergency Department.  [id number]

## 2025-02-05 NOTE — PROGRESS NOTES
Hospitalist Progress Note      PCP: No primary care provider on file.    Date of Admission: 2/4/2025    Chief Complaint: SOB    Hospital Course: 79 y.o. female with history of COPD, chronic respiratory failure with hypoxia on 3 L O2 via NC at night, SCOTTIE came to ER with SOB.  Admitted as inpatient for AECOPD.  Started on IV steroids, nebs and IV Abx.  Viral panel negative.     Subjective:  Patient dropped to O2 sats in 60s with activity.  Still coughing and SOB.  No CP, HA or fevers.       Medications:  Reviewed    Infusion Medications    sodium chloride      sodium chloride 75 mL/hr at 02/04/25 2132     Scheduled Medications    pantoprazole  40 mg Oral QAM AC    sodium chloride flush  5-40 mL IntraVENous 2 times per day    enoxaparin  40 mg SubCUTAneous Daily    methylPREDNISolone  40 mg IntraVENous Q6H    ipratropium 0.5 mg-albuterol 2.5 mg  1 Dose Inhalation Q4H RT    arformoterol tartrate  15 mcg Nebulization BID RT    budesonide  0.5 mg Nebulization BID RT    azithromycin  500 mg IntraVENous Q24H     PRN Meds: aluminum & magnesium hydroxide-simethicone, sodium chloride flush, sodium chloride, potassium chloride **OR** potassium alternative oral replacement **OR** potassium chloride, magnesium sulfate, ondansetron **OR** ondansetron, polyethylene glycol, acetaminophen **OR** acetaminophen, guaiFENesin-dextromethorphan    No intake or output data in the 24 hours ending 02/05/25 1212    Physical Exam Performed:    BP (!) 148/68   Pulse 78   Temp 98 °F (36.7 °C) (Oral)   Resp 18   Ht 1.753 m (5' 9\")   Wt 98 kg (216 lb)   SpO2 95%   BMI 31.90 kg/m²     General appearance:  Appears comfortable. Well nourished  Eyes: Sclera clear, pupils equal  ENT: Moist mucus membranes, no thrush. Trachea midline.  Cardiovascular: Regular rhythm, normal S1, S2. No murmur, gallop, rub. No edema in lower extremities  Respiratory: BL wheezing. No rales.  Scattered BL rhonchi.  Gastrointestinal: Abdomen soft, obese,

## 2025-02-05 NOTE — PROGRESS NOTES
Pharmacy Home Medication Reconciliation Note    A medication reconciliation has been completed for Monica Tracey 1945    Pharmacy: [facility] [id number], [place], [place]  Information provided by: patient    The patient's home medication list is as follows:  No current facility-administered medications on file prior to encounter.     Current Outpatient Medications on File Prior to Encounter   Medication Sig Dispense Refill    amLODIPine (NORVASC) 10 MG tablet Take 1 tablet by mouth daily      Budeson-Glycopyrrol-Formoterol (BREZTRI AEROSPHERE) 160-9-4.8 MCG/ACT AERO Inhale 1 puff into the lungs 2 times daily      levothyroxine (SYNTHROID) 25 MCG tablet Take 1 tablet by mouth every morning      metFORMIN (GLUCOPHAGE-XR) 500 MG extended release tablet Take 1 tablet by mouth 2 times daily (with meals)      simvastatin (ZOCOR) 40 MG tablet Take 1 tablet by mouth nightly      vitamin D 25 MCG (1000 UT) CAPS Take 1 capsule by mouth daily      albuterol sulfate HFA (VENTOLIN HFA) 108 (90 Base) MCG/ACT inhaler Inhale 2 puffs into the lungs every 6 hours as needed for Wheezing         Of note, patient did not take any home meds other than her Breztri prior to ED arrival.    Timing of last doses updated.    Thank you,  Belen Turner CPhT

## 2025-02-05 NOTE — PROGRESS NOTES
This morning Patient ambulating to bathroom on 4L NC , O2 dropped to 53% when returning back to bed . Patient recovered ,O2 at 96% back on 4L

## 2025-02-06 ENCOUNTER — APPOINTMENT (OUTPATIENT)
Dept: CT IMAGING | Age: 80
End: 2025-02-06
Payer: MEDICARE

## 2025-02-06 ENCOUNTER — APPOINTMENT (OUTPATIENT)
Age: 80
End: 2025-02-06
Attending: INTERNAL MEDICINE
Payer: MEDICARE

## 2025-02-06 LAB
ANION GAP SERPL CALCULATED.3IONS-SCNC: 13 MMOL/L (ref 3–16)
ANION GAP SERPL CALCULATED.3IONS-SCNC: 13 MMOL/L (ref 3–16)
BASOPHILS # BLD: 0 K/UL (ref 0–0.2)
BASOPHILS # BLD: 0 K/UL (ref 0–0.2)
BASOPHILS NFR BLD: 0.1 %
BASOPHILS NFR BLD: 0.1 %
BUN SERPL-MCNC: 12 MG/DL (ref 7–20)
BUN SERPL-MCNC: 12 MG/DL (ref 7–20)
CALCIUM SERPL-MCNC: 9.4 MG/DL (ref 8.3–10.6)
CALCIUM SERPL-MCNC: 9.4 MG/DL (ref 8.3–10.6)
CHLORIDE SERPL-SCNC: 107 MMOL/L (ref 99–110)
CHLORIDE SERPL-SCNC: 107 MMOL/L (ref 99–110)
CO2 SERPL-SCNC: 19 MMOL/L (ref 21–32)
CO2 SERPL-SCNC: 19 MMOL/L (ref 21–32)
CREAT SERPL-MCNC: 0.7 MG/DL (ref 0.6–1.2)
CREAT SERPL-MCNC: 0.7 MG/DL (ref 0.6–1.2)
DEPRECATED RDW RBC AUTO: 16.4 % (ref 12.4–15.4)
DEPRECATED RDW RBC AUTO: 16.4 % (ref 12.4–15.4)
ECHO AO ASC DIAM: 2.8 CM
ECHO AO ASC DIAM: 2.8 CM
ECHO AO ASCENDING AORTA INDEX: 1.31 CM/M2
ECHO AO ASCENDING AORTA INDEX: 1.31 CM/M2
ECHO AO ROOT DIAM: 2.7 CM
ECHO AO ROOT DIAM: 2.7 CM
ECHO AO ROOT INDEX: 1.27 CM/M2
ECHO AO ROOT INDEX: 1.27 CM/M2
ECHO AV AREA PEAK VELOCITY: 2.6 CM2
ECHO AV AREA PEAK VELOCITY: 2.6 CM2
ECHO AV AREA VTI: 3 CM2
ECHO AV AREA VTI: 3 CM2
ECHO AV AREA/BSA PEAK VELOCITY: 1.2 CM2/M2
ECHO AV AREA/BSA PEAK VELOCITY: 1.2 CM2/M2
ECHO AV AREA/BSA VTI: 1.4 CM2/M2
ECHO AV AREA/BSA VTI: 1.4 CM2/M2
ECHO AV MEAN GRADIENT: 6 MMHG
ECHO AV MEAN GRADIENT: 6 MMHG
ECHO AV MEAN VELOCITY: 1.1 M/S
ECHO AV MEAN VELOCITY: 1.1 M/S
ECHO AV PEAK GRADIENT: 17 MMHG
ECHO AV PEAK GRADIENT: 17 MMHG
ECHO AV PEAK VELOCITY: 2 M/S
ECHO AV PEAK VELOCITY: 2 M/S
ECHO AV VELOCITY RATIO: 0.85
ECHO AV VELOCITY RATIO: 0.85
ECHO AV VTI: 34.6 CM
ECHO AV VTI: 34.6 CM
ECHO BSA: 2.18 M2
ECHO BSA: 2.18 M2
ECHO EST RA PRESSURE: 3 MMHG
ECHO EST RA PRESSURE: 3 MMHG
ECHO IVC PROX: 0.9 CM
ECHO IVC PROX: 0.9 CM
ECHO LA AREA 2C: 18.7 CM2
ECHO LA AREA 2C: 18.7 CM2
ECHO LA AREA 4C: 17.8 CM2
ECHO LA AREA 4C: 17.8 CM2
ECHO LA DIAMETER INDEX: 1.5 CM/M2
ECHO LA DIAMETER INDEX: 1.5 CM/M2
ECHO LA DIAMETER: 3.2 CM
ECHO LA DIAMETER: 3.2 CM
ECHO LA MAJOR AXIS: 5.5 CM
ECHO LA MAJOR AXIS: 5.5 CM
ECHO LA MINOR AXIS: 5.2 CM
ECHO LA MINOR AXIS: 5.2 CM
ECHO LA TO AORTIC ROOT RATIO: 1.19
ECHO LA TO AORTIC ROOT RATIO: 1.19
ECHO LA VOL BP: 51 ML (ref 22–52)
ECHO LA VOL BP: 51 ML (ref 22–52)
ECHO LA VOL MOD A2C: 55 ML (ref 22–52)
ECHO LA VOL MOD A2C: 55 ML (ref 22–52)
ECHO LA VOL MOD A4C: 45 ML (ref 22–52)
ECHO LA VOL MOD A4C: 45 ML (ref 22–52)
ECHO LA VOL/BSA BIPLANE: 24 ML/M2 (ref 16–34)
ECHO LA VOL/BSA BIPLANE: 24 ML/M2 (ref 16–34)
ECHO LA VOLUME INDEX MOD A2C: 26 ML/M2 (ref 16–34)
ECHO LA VOLUME INDEX MOD A2C: 26 ML/M2 (ref 16–34)
ECHO LA VOLUME INDEX MOD A4C: 21 ML/M2 (ref 16–34)
ECHO LA VOLUME INDEX MOD A4C: 21 ML/M2 (ref 16–34)
ECHO LV E' LATERAL VELOCITY: 9.57 CM/S
ECHO LV E' LATERAL VELOCITY: 9.57 CM/S
ECHO LV E' SEPTAL VELOCITY: 6.64 CM/S
ECHO LV E' SEPTAL VELOCITY: 6.64 CM/S
ECHO LV EDV A2C: 151 ML
ECHO LV EDV A2C: 151 ML
ECHO LV EDV A4C: 89 ML
ECHO LV EDV A4C: 89 ML
ECHO LV EDV INDEX A4C: 42 ML/M2
ECHO LV EDV INDEX A4C: 42 ML/M2
ECHO LV EDV NDEX A2C: 71 ML/M2
ECHO LV EDV NDEX A2C: 71 ML/M2
ECHO LV EJECTION FRACTION A2C: 42 %
ECHO LV EJECTION FRACTION A2C: 42 %
ECHO LV EJECTION FRACTION A4C: 69 %
ECHO LV EJECTION FRACTION A4C: 69 %
ECHO LV EJECTION FRACTION BIPLANE: 56 % (ref 55–100)
ECHO LV EJECTION FRACTION BIPLANE: 56 % (ref 55–100)
ECHO LV ESV A2C: 88 ML
ECHO LV ESV A2C: 88 ML
ECHO LV ESV A4C: 28 ML
ECHO LV ESV A4C: 28 ML
ECHO LV ESV INDEX A2C: 41 ML/M2
ECHO LV ESV INDEX A2C: 41 ML/M2
ECHO LV ESV INDEX A4C: 13 ML/M2
ECHO LV ESV INDEX A4C: 13 ML/M2
ECHO LV FRACTIONAL SHORTENING: 32 % (ref 28–44)
ECHO LV FRACTIONAL SHORTENING: 32 % (ref 28–44)
ECHO LV INTERNAL DIMENSION DIASTOLE INDEX: 1.92 CM/M2
ECHO LV INTERNAL DIMENSION DIASTOLE INDEX: 1.92 CM/M2
ECHO LV INTERNAL DIMENSION DIASTOLIC: 4.1 CM (ref 3.9–5.3)
ECHO LV INTERNAL DIMENSION DIASTOLIC: 4.1 CM (ref 3.9–5.3)
ECHO LV INTERNAL DIMENSION SYSTOLIC INDEX: 1.31 CM/M2
ECHO LV INTERNAL DIMENSION SYSTOLIC INDEX: 1.31 CM/M2
ECHO LV INTERNAL DIMENSION SYSTOLIC: 2.8 CM
ECHO LV INTERNAL DIMENSION SYSTOLIC: 2.8 CM
ECHO LV IVSD: 1 CM (ref 0.6–0.9)
ECHO LV IVSD: 1 CM (ref 0.6–0.9)
ECHO LV MASS 2D: 132.1 G (ref 67–162)
ECHO LV MASS 2D: 132.1 G (ref 67–162)
ECHO LV MASS INDEX 2D: 62 G/M2 (ref 43–95)
ECHO LV MASS INDEX 2D: 62 G/M2 (ref 43–95)
ECHO LV POSTERIOR WALL DIASTOLIC: 1 CM (ref 0.6–0.9)
ECHO LV POSTERIOR WALL DIASTOLIC: 1 CM (ref 0.6–0.9)
ECHO LV RELATIVE WALL THICKNESS RATIO: 0.49
ECHO LV RELATIVE WALL THICKNESS RATIO: 0.49
ECHO LVOT AREA: 3.1 CM2
ECHO LVOT AREA: 3.1 CM2
ECHO LVOT AV VTI INDEX: 0.95
ECHO LVOT AV VTI INDEX: 0.95
ECHO LVOT DIAM: 2 CM
ECHO LVOT DIAM: 2 CM
ECHO LVOT MEAN GRADIENT: 5 MMHG
ECHO LVOT MEAN GRADIENT: 5 MMHG
ECHO LVOT PEAK GRADIENT: 12 MMHG
ECHO LVOT PEAK GRADIENT: 12 MMHG
ECHO LVOT PEAK VELOCITY: 1.7 M/S
ECHO LVOT PEAK VELOCITY: 1.7 M/S
ECHO LVOT STROKE VOLUME INDEX: 48.6 ML/M2
ECHO LVOT STROKE VOLUME INDEX: 48.6 ML/M2
ECHO LVOT SV: 103.6 ML
ECHO LVOT SV: 103.6 ML
ECHO LVOT VTI: 33 CM
ECHO LVOT VTI: 33 CM
ECHO MV A VELOCITY: 1.32 M/S
ECHO MV A VELOCITY: 1.32 M/S
ECHO MV AREA VTI: 2.2 CM2
ECHO MV AREA VTI: 2.2 CM2
ECHO MV E DECELERATION TIME (DT): 116 MS
ECHO MV E DECELERATION TIME (DT): 116 MS
ECHO MV E VELOCITY: 1.14 M/S
ECHO MV E VELOCITY: 1.14 M/S
ECHO MV E/A RATIO: 0.86
ECHO MV E/A RATIO: 0.86
ECHO MV E/E' LATERAL: 11.91
ECHO MV E/E' LATERAL: 11.91
ECHO MV E/E' RATIO (AVERAGED): 14.54
ECHO MV E/E' RATIO (AVERAGED): 14.54
ECHO MV E/E' SEPTAL: 17.17
ECHO MV E/E' SEPTAL: 17.17
ECHO MV LVOT VTI INDEX: 1.45
ECHO MV LVOT VTI INDEX: 1.45
ECHO MV MAX VELOCITY: 1.5 M/S
ECHO MV MAX VELOCITY: 1.5 M/S
ECHO MV MEAN GRADIENT: 4 MMHG
ECHO MV MEAN GRADIENT: 4 MMHG
ECHO MV MEAN VELOCITY: 0.9 M/S
ECHO MV MEAN VELOCITY: 0.9 M/S
ECHO MV PEAK GRADIENT: 9 MMHG
ECHO MV PEAK GRADIENT: 9 MMHG
ECHO MV VTI: 48 CM
ECHO MV VTI: 48 CM
ECHO PV MAX VELOCITY: 1.3 M/S
ECHO PV MAX VELOCITY: 1.3 M/S
ECHO PV PEAK GRADIENT: 7 MMHG
ECHO PV PEAK GRADIENT: 7 MMHG
ECHO RA AREA 4C: 17.3 CM2
ECHO RA AREA 4C: 17.3 CM2
ECHO RA END SYSTOLIC VOLUME APICAL 4 CHAMBER INDEX BSA: 22 ML/M2
ECHO RA END SYSTOLIC VOLUME APICAL 4 CHAMBER INDEX BSA: 22 ML/M2
ECHO RA VOLUME: 46 ML
ECHO RA VOLUME: 46 ML
ECHO RIGHT VENTRICULAR SYSTOLIC PRESSURE (RVSP): 25 MMHG
ECHO RIGHT VENTRICULAR SYSTOLIC PRESSURE (RVSP): 25 MMHG
ECHO RV BASAL DIMENSION: 4.1 CM
ECHO RV BASAL DIMENSION: 4.1 CM
ECHO RV FREE WALL PEAK S': 13.3 CM/S
ECHO RV FREE WALL PEAK S': 13.3 CM/S
ECHO RV LONGITUDINAL DIMENSION: 7.9 CM
ECHO RV LONGITUDINAL DIMENSION: 7.9 CM
ECHO RV MID DIMENSION: 2.8 CM
ECHO RV MID DIMENSION: 2.8 CM
ECHO RV TAPSE: 2.1 CM (ref 1.7–?)
ECHO RV TAPSE: 2.1 CM (ref 1.7–?)
ECHO TV REGURGITANT MAX VELOCITY: 2.37 M/S
ECHO TV REGURGITANT MAX VELOCITY: 2.37 M/S
ECHO TV REGURGITANT PEAK GRADIENT: 22 MMHG
ECHO TV REGURGITANT PEAK GRADIENT: 22 MMHG
EOSINOPHIL # BLD: 0 K/UL (ref 0–0.6)
EOSINOPHIL # BLD: 0 K/UL (ref 0–0.6)
EOSINOPHIL NFR BLD: 0 %
EOSINOPHIL NFR BLD: 0 %
GFR SERPLBLD CREATININE-BSD FMLA CKD-EPI: 87 ML/MIN/{1.73_M2}
GFR SERPLBLD CREATININE-BSD FMLA CKD-EPI: 87 ML/MIN/{1.73_M2}
GLUCOSE BLD-MCNC: 200 MG/DL (ref 70–99)
GLUCOSE BLD-MCNC: 200 MG/DL (ref 70–99)
GLUCOSE BLD-MCNC: 256 MG/DL (ref 70–99)
GLUCOSE BLD-MCNC: 256 MG/DL (ref 70–99)
GLUCOSE BLD-MCNC: 261 MG/DL (ref 70–99)
GLUCOSE BLD-MCNC: 261 MG/DL (ref 70–99)
GLUCOSE BLD-MCNC: 291 MG/DL (ref 70–99)
GLUCOSE BLD-MCNC: 291 MG/DL (ref 70–99)
GLUCOSE SERPL-MCNC: 255 MG/DL (ref 70–99)
GLUCOSE SERPL-MCNC: 255 MG/DL (ref 70–99)
HCT VFR BLD AUTO: 30.6 % (ref 36–48)
HCT VFR BLD AUTO: 30.6 % (ref 36–48)
HGB BLD-MCNC: 10 G/DL (ref 12–16)
HGB BLD-MCNC: 10 G/DL (ref 12–16)
LYMPHOCYTES # BLD: 0.6 K/UL (ref 1–5.1)
LYMPHOCYTES # BLD: 0.6 K/UL (ref 1–5.1)
LYMPHOCYTES NFR BLD: 3.9 %
LYMPHOCYTES NFR BLD: 3.9 %
MCH RBC QN AUTO: 27.1 PG (ref 26–34)
MCH RBC QN AUTO: 27.1 PG (ref 26–34)
MCHC RBC AUTO-ENTMCNC: 32.6 G/DL (ref 31–36)
MCHC RBC AUTO-ENTMCNC: 32.6 G/DL (ref 31–36)
MCV RBC AUTO: 83.3 FL (ref 80–100)
MCV RBC AUTO: 83.3 FL (ref 80–100)
MONOCYTES # BLD: 0.6 K/UL (ref 0–1.3)
MONOCYTES # BLD: 0.6 K/UL (ref 0–1.3)
MONOCYTES NFR BLD: 4 %
MONOCYTES NFR BLD: 4 %
NEUTROPHILS # BLD: 13.5 K/UL (ref 1.7–7.7)
NEUTROPHILS # BLD: 13.5 K/UL (ref 1.7–7.7)
NEUTROPHILS NFR BLD: 92 %
NEUTROPHILS NFR BLD: 92 %
PERFORMED ON: ABNORMAL
PLATELET # BLD AUTO: 263 K/UL (ref 135–450)
PLATELET # BLD AUTO: 263 K/UL (ref 135–450)
PMV BLD AUTO: 7.9 FL (ref 5–10.5)
PMV BLD AUTO: 7.9 FL (ref 5–10.5)
POTASSIUM SERPL-SCNC: 4.1 MMOL/L (ref 3.5–5.1)
POTASSIUM SERPL-SCNC: 4.1 MMOL/L (ref 3.5–5.1)
RBC # BLD AUTO: 3.67 M/UL (ref 4–5.2)
RBC # BLD AUTO: 3.67 M/UL (ref 4–5.2)
SODIUM SERPL-SCNC: 139 MMOL/L (ref 136–145)
SODIUM SERPL-SCNC: 139 MMOL/L (ref 136–145)
WBC # BLD AUTO: 14.7 K/UL (ref 4–11)
WBC # BLD AUTO: 14.7 K/UL (ref 4–11)

## 2025-02-06 PROCEDURE — 80048 BASIC METABOLIC PNL TOTAL CA: CPT

## 2025-02-06 PROCEDURE — 2700000000 HC OXYGEN THERAPY PER DAY

## 2025-02-06 PROCEDURE — 1200000000 HC SEMI PRIVATE

## 2025-02-06 PROCEDURE — 97110 THERAPEUTIC EXERCISES: CPT

## 2025-02-06 PROCEDURE — 70491 CT SOFT TISSUE NECK W/DYE: CPT

## 2025-02-06 PROCEDURE — 2500000003 HC RX 250 WO HCPCS: Performed by: INTERNAL MEDICINE

## 2025-02-06 PROCEDURE — 36415 COLL VENOUS BLD VENIPUNCTURE: CPT

## 2025-02-06 PROCEDURE — 85025 COMPLETE CBC W/AUTO DIFF WBC: CPT

## 2025-02-06 PROCEDURE — C8929 TTE W OR WO FOL WCON,DOPPLER: HCPCS

## 2025-02-06 PROCEDURE — 99221 1ST HOSP IP/OBS SF/LOW 40: CPT | Performed by: STUDENT IN AN ORGANIZED HEALTH CARE EDUCATION/TRAINING PROGRAM

## 2025-02-06 PROCEDURE — 6370000000 HC RX 637 (ALT 250 FOR IP): Performed by: INTERNAL MEDICINE

## 2025-02-06 PROCEDURE — 2580000003 HC RX 258: Performed by: INTERNAL MEDICINE

## 2025-02-06 PROCEDURE — 97116 GAIT TRAINING THERAPY: CPT

## 2025-02-06 PROCEDURE — 93306 TTE W/DOPPLER COMPLETE: CPT | Performed by: INTERNAL MEDICINE

## 2025-02-06 PROCEDURE — 83036 HEMOGLOBIN GLYCOSYLATED A1C: CPT

## 2025-02-06 PROCEDURE — 6360000002 HC RX W HCPCS: Performed by: INTERNAL MEDICINE

## 2025-02-06 PROCEDURE — 94640 AIRWAY INHALATION TREATMENT: CPT

## 2025-02-06 PROCEDURE — 94761 N-INVAS EAR/PLS OXIMETRY MLT: CPT

## 2025-02-06 PROCEDURE — 6360000004 HC RX CONTRAST MEDICATION: Performed by: INTERNAL MEDICINE

## 2025-02-06 RX ORDER — AMLODIPINE BESYLATE 5 MG/1
10 TABLET ORAL DAILY
Status: DISCONTINUED | OUTPATIENT
Start: 2025-02-06 | End: 2025-02-08 | Stop reason: HOSPADM

## 2025-02-06 RX ORDER — LEVOTHYROXINE SODIUM 25 UG/1
25 TABLET ORAL
Status: DISCONTINUED | OUTPATIENT
Start: 2025-02-06 | End: 2025-02-08 | Stop reason: HOSPADM

## 2025-02-06 RX ORDER — GLUCAGON 1 MG/ML
1 KIT INJECTION PRN
Status: DISCONTINUED | OUTPATIENT
Start: 2025-02-06 | End: 2025-02-08 | Stop reason: HOSPADM

## 2025-02-06 RX ORDER — INSULIN GLARGINE 100 [IU]/ML
15 INJECTION, SOLUTION SUBCUTANEOUS DAILY
Status: DISCONTINUED | OUTPATIENT
Start: 2025-02-06 | End: 2025-02-08 | Stop reason: HOSPADM

## 2025-02-06 RX ORDER — INSULIN LISPRO 100 [IU]/ML
0-8 INJECTION, SOLUTION INTRAVENOUS; SUBCUTANEOUS
Status: DISCONTINUED | OUTPATIENT
Start: 2025-02-06 | End: 2025-02-08 | Stop reason: HOSPADM

## 2025-02-06 RX ORDER — IPRATROPIUM BROMIDE AND ALBUTEROL SULFATE 2.5; .5 MG/3ML; MG/3ML
1 SOLUTION RESPIRATORY (INHALATION) EVERY 4 HOURS PRN
Status: DISCONTINUED | OUTPATIENT
Start: 2025-02-06 | End: 2025-02-08 | Stop reason: HOSPADM

## 2025-02-06 RX ORDER — DEXTROSE MONOHYDRATE 100 MG/ML
INJECTION, SOLUTION INTRAVENOUS CONTINUOUS PRN
Status: DISCONTINUED | OUTPATIENT
Start: 2025-02-06 | End: 2025-02-08 | Stop reason: HOSPADM

## 2025-02-06 RX ORDER — IPRATROPIUM BROMIDE AND ALBUTEROL SULFATE 2.5; .5 MG/3ML; MG/3ML
1 SOLUTION RESPIRATORY (INHALATION)
Status: DISCONTINUED | OUTPATIENT
Start: 2025-02-06 | End: 2025-02-07

## 2025-02-06 RX ADMIN — SULFUR HEXAFLUORIDE 2 ML: 60.7; .19; .19 INJECTION, POWDER, LYOPHILIZED, FOR SUSPENSION INTRAVENOUS; INTRAVESICAL at 15:34

## 2025-02-06 RX ADMIN — SODIUM CHLORIDE, PRESERVATIVE FREE 10 ML: 5 INJECTION INTRAVENOUS at 20:13

## 2025-02-06 RX ADMIN — LEVOTHYROXINE SODIUM 25 MCG: 0.03 TABLET ORAL at 09:40

## 2025-02-06 RX ADMIN — INSULIN GLARGINE 15 UNITS: 100 INJECTION, SOLUTION SUBCUTANEOUS at 15:56

## 2025-02-06 RX ADMIN — AZITHROMYCIN MONOHYDRATE 500 MG: 500 INJECTION, POWDER, LYOPHILIZED, FOR SOLUTION INTRAVENOUS at 20:24

## 2025-02-06 RX ADMIN — INSULIN LISPRO 4 UNITS: 100 INJECTION, SOLUTION INTRAVENOUS; SUBCUTANEOUS at 17:08

## 2025-02-06 RX ADMIN — AMLODIPINE BESYLATE 10 MG: 5 TABLET ORAL at 09:40

## 2025-02-06 RX ADMIN — BUDESONIDE 500 MCG: 0.5 INHALANT RESPIRATORY (INHALATION) at 07:51

## 2025-02-06 RX ADMIN — SODIUM CHLORIDE, PRESERVATIVE FREE 10 ML: 5 INJECTION INTRAVENOUS at 09:43

## 2025-02-06 RX ADMIN — ENOXAPARIN SODIUM 40 MG: 100 INJECTION SUBCUTANEOUS at 09:42

## 2025-02-06 RX ADMIN — ARFORMOTEROL TARTRATE 15 MCG: 15 SOLUTION RESPIRATORY (INHALATION) at 07:51

## 2025-02-06 RX ADMIN — IOHEXOL 75 ML: 350 INJECTION, SOLUTION INTRAVENOUS at 13:16

## 2025-02-06 RX ADMIN — IPRATROPIUM BROMIDE AND ALBUTEROL SULFATE 1 DOSE: .5; 3 SOLUTION RESPIRATORY (INHALATION) at 21:32

## 2025-02-06 RX ADMIN — WATER 40 MG: 1 INJECTION INTRAMUSCULAR; INTRAVENOUS; SUBCUTANEOUS at 14:26

## 2025-02-06 RX ADMIN — WATER 40 MG: 1 INJECTION INTRAMUSCULAR; INTRAVENOUS; SUBCUTANEOUS at 20:12

## 2025-02-06 RX ADMIN — ARFORMOTEROL TARTRATE 15 MCG: 15 SOLUTION RESPIRATORY (INHALATION) at 21:42

## 2025-02-06 RX ADMIN — PANTOPRAZOLE SODIUM 40 MG: 40 TABLET, DELAYED RELEASE ORAL at 06:04

## 2025-02-06 RX ADMIN — WATER 40 MG: 1 INJECTION INTRAMUSCULAR; INTRAVENOUS; SUBCUTANEOUS at 06:03

## 2025-02-06 RX ADMIN — IPRATROPIUM BROMIDE AND ALBUTEROL SULFATE 1 DOSE: .5; 3 SOLUTION RESPIRATORY (INHALATION) at 07:51

## 2025-02-06 RX ADMIN — INSULIN LISPRO 4 UNITS: 100 INJECTION, SOLUTION INTRAVENOUS; SUBCUTANEOUS at 20:12

## 2025-02-06 RX ADMIN — IPRATROPIUM BROMIDE AND ALBUTEROL SULFATE 1 DOSE: .5; 3 SOLUTION RESPIRATORY (INHALATION) at 12:29

## 2025-02-06 RX ADMIN — BUDESONIDE 500 MCG: 0.5 INHALANT RESPIRATORY (INHALATION) at 21:33

## 2025-02-06 NOTE — PROGRESS NOTES
Shift assessment completed. Routine vitals obtained. Patient is awake, alert and oriented. Respirations are easy and unlabored. Patient does not appear to be in distress, resting comfortably at this time. Call light within reach. Fall precautions are in place.

## 2025-02-06 NOTE — PROGRESS NOTES
[facility] - Inpatient Rehabilitation Department   Phone: [phone]    Physical Therapy    [] Initial Evaluation            [x] Daily Treatment Note         [] Discharge Summary      Patient: Monica Tracey   : 1945   MRN: [id number]   Date of Service:  2025  Admitting Diagnosis: COPD with acute exacerbation (HCC)  Current Admission Summary: Monica Tracey is a 79 y.o. female with history of COPD, chronic respiratory failure with hypoxia on 3 L O2 via NC at night, SCOTTIE came to ER with SOB.  Patient had fever to 102 F at home.  Has been SOB for a week.  Worse with exertion.  No LE edema, CP, dysphagia, melena, hematochezia, abdominal pain, HA, dizziness, LH or syncope.  Apparently was recently on Abx for UTI and had some diarrhea.  Otherwise complete ROS is negative unless listed above.     - Respiratory panel all negative - Flu A, B, Covid  Past Medical History:  has no past medical history on file.  Past Surgical History:  has no past surgical history on file.  Discharge Recommendations: Monica Tracey scored a 22/24 on the AM-PAC short mobility form. Current research shows that an AM-PAC score of 18 or greater is typically associated with a discharge to the patient's home setting. Based on the patient's AM-PAC score and their current functional mobility deficits, it is recommended that the patient have 2-3 sessions per week of Physical Therapy at d/c to increase the patient's independence.  At this time, this patient demonstrates the endurance and safety to discharge home with HHPT and a follow up treatment frequency of 2-3x/wk.  Please see assessment section for further patient specific details.    If patient discharges prior to next session this note will serve as a discharge summary.  Please see below for the latest assessment towards goals.     DME Required For Discharge: no DME required at discharge  Precautions/Restrictions: low fall risk  Weight Bearing Restrictions: no  understanding    Assessment  Activity Tolerance: Significantly limited by desat. On3 L upon entry in room, proactively turned up to 5L anticipating desat.  Pt. Did drop to To as low as 61 with very limited ambulation while on 5L of O2, increased to 6 L to recover.  Recovers quickly with rest but any activity causes desaturation.  Significant rest breaks between activities.  Impairments Requiring Therapeutic Intervention: decreased functional mobility, decreased strength, decreased endurance, decreased balance  Prognosis: good  Clinical Assessment: Pt. Presents with SOB, diagnosed with COPD exacerbation.  Pt. Is below baseline level of function and will benefit from PT to improve functional mobility and optimize independence.  Pt. Is significantly limited by quickly desating with minimal movement but highly motivated.   Pt. Is anticipate to be able to return home with help of Daughter PRN.  Recommend HHPT.  Safety Interventions: patient left in chair, chair alarm in place, call light within reach, nurse notified, and family/caregiver present Daughter is present    Plan  Frequency: 3-5 x/per week  Current Treatment Recommendations: strengthening, balance training, functional mobility training, transfer training, gait training, stair training, endurance training, patient/caregiver education, home exercise program, safety education, and equipment evaluation/education    Goals  Patient Goals: breath better with mobility   Short Term Goals:  Time Frame: by d/c  Patient will complete bed mobility at Independent   Patient will complete transfers at Independent   Patient will ambulate 50 ft with use of LRAD at Independent  Patient will ascend/descend 4 stairs with (B) handrail at supervision    Above goals reviewed on 2/6/2025.  All goals are ongoing at this time unless indicated above.      Therapy Session Time      Individual Group Co-treatment   Time In 0900       Time Out 0935       Minutes 35         Timed Code

## 2025-02-06 NOTE — RT PROTOCOL NOTE
RT Nebulizer Bronchodilator Protocol Note    There is a bronchodilator order in the chart from a provider indicating to follow the RT Bronchodilator Protocol and there is an “Initiate RT Bronchodilator Protocol” order as well (see protocol at bottom of note).    CXR Findings:  XR CHEST PORTABLE    Result Date: 2/4/2025  No acute consolidation.       The findings from the last RT Protocol Assessment were as follows:  Smoking: Chronic pulmonary disease  Respiratory Pattern: Dyspnea on exertion or RR 21-25 bpm  Breath Sounds: Slightly diminished and/or crackles  Cough: Strong, spontaneous, non-productive  Indication for Bronchodilator Therapy: On home bronchodilators  Bronchodilator Assessment Score: 6    Aerosolized bronchodilator medication orders have been revised according to the RT Nebulizer Bronchodilator Protocol below.    Respiratory Therapist to perform RT Therapy Protocol Assessment initially then follow the protocol.  Repeat RT Therapy Protocol Assessment PRN for score 0-3 or on second treatment, BID, and PRN for scores above 3.    No Indications - adjust the frequency to every 6 hours PRN wheezing or bronchospasm, if no treatments needed after 48 hours then discontinue using Per Protocol order mode.     If indication present, adjust the RT bronchodilator orders based on the Bronchodilator Assessment Score as indicated below.  If a patient is on this medication at home then do not decrease Frequency below that used at home.    0-3 - enter or revise RT bronchodilator order(s) to equivalent RT Bronchodilator order with Frequency of every 4 hours PRN for wheezing or increased work of breathing using Per Protocol order mode.       4-6 - enter or revise RT Bronchodilator order(s) to two equivalent RT bronchodilator orders with one order with BID Frequency and one order with Frequency of every 4 hours PRN wheezing or increased work of breathing using Per Protocol order mode.         7-10 - enter or revise RT

## 2025-02-06 NOTE — PROGRESS NOTES
Hospitalist Progress Note      PCP: No primary care provider on file.    Date of Admission: 2/4/2025    Chief Complaint: SOB    Hospital Course: 79 y.o. female with history of COPD, chronic respiratory failure with hypoxia on 3 L O2 via NC at night, SCOTTIE came to ER with SOB.  Admitted as inpatient for AECOPD.  Started on IV steroids, nebs and IV Abx.  Viral panel negative.     Complained of neck/throat pain.  ENT consulted.    Pulm consulted for SOB.  Echo requested.    CT neck:  IMPRESSION:  1. Indeterminate 1.7 cm mass along the floor of the anterior cranial fossa  subjacent to the left frontal lobe suggestive of a meningioma.  An MRI of the  brain with and without contrast is recommended for further evaluation of this  finding.  2. No acute abnormality, soft tissue mass or lymphadenopathy identified  within the neck.    Neurosurgery consulted for intracranial mass.  MRI Brain requested.    Subjective:  Patient dropped to O2 sats in 70s with activity today.  Continues coughing and still SOB.  No CP, HA or fevers.       Medications:  Reviewed    Infusion Medications    sodium chloride       Scheduled Medications    ipratropium 0.5 mg-albuterol 2.5 mg  1 Dose Inhalation TID RT    levothyroxine  25 mcg Oral QAM AC    amLODIPine  10 mg Oral Daily    methylPREDNISolone  40 mg IntraVENous Q8H    pantoprazole  40 mg Oral QAM AC    sodium chloride flush  5-40 mL IntraVENous 2 times per day    enoxaparin  40 mg SubCUTAneous Daily    arformoterol tartrate  15 mcg Nebulization BID RT    budesonide  0.5 mg Nebulization BID RT    azithromycin  500 mg IntraVENous Q24H     PRN Meds: ipratropium 0.5 mg-albuterol 2.5 mg, sulfur hexafluoride microspheres, aluminum & magnesium hydroxide-simethicone, sodium chloride flush, sodium chloride, potassium chloride **OR** potassium alternative oral replacement **OR** potassium chloride, magnesium sulfate, ondansetron **OR** ondansetron, polyethylene glycol, acetaminophen **OR**

## 2025-02-06 NOTE — ACP (ADVANCE CARE PLANNING)
Advanced Care Planning Note.    Purpose of Encounter: Advanced care planning in light of acute COPD exacerbation  Parties In Attendance: Patient, daughter  Decisional Capacity: Yes  Subjective: Patient is still coughing and SOB  Objective: Cr 0.7  Goals of Care Determination: Patient wants full support (CPR, vent, surgery, short HD, no trach or PEG)  Plan:  IV steroids, nebs, IV Abx, Echo, MRI Brain, CT Neck, Pulm and ENT consults, PT/OT/SLP  Code Status: Full code   Time spent on Advanced care Plannin minutes  Advanced Care Planning Documents: Completed advanced directives on chart, daughters share the POA.    Abram Benitez MD  2025 2:34 PM

## 2025-02-06 NOTE — PLAN OF CARE
Problem: Safety - Adult  Goal: Free from fall injury  2/6/2025 0425 by Jie Esquivel RN  Outcome: Progressing  2/5/2025 1426 by Sharon Dickerson RN  Outcome: Progressing     Problem: Chronic Conditions and Co-morbidities  Goal: Patient's chronic conditions and co-morbidity symptoms are monitored and maintained or improved  2/6/2025 0425 by Jie Esquivel RN  Outcome: Progressing  2/5/2025 1426 by Sharon Dickerson RN  Outcome: Progressing     Problem: Respiratory - Adult  Goal: Achieves optimal ventilation and oxygenation  2/6/2025 0425 by Jie Esquivel RN  Outcome: Progressing  2/5/2025 1426 by Sharon Dickerson RN  Reactivated

## 2025-02-06 NOTE — DISCHARGE INSTR - COC
Continuity of Care Form    Patient Name: Monica Tracey   :  1945  MRN:  [id number]    Admit date:  2025  Discharge date:  24    Code Status Order: Full Code   Advance Directives:   Advance Care Flowsheet Documentation             Admitting Physician:  Abram Benitez MD  PCP: No primary care provider on file.    Discharging Nurse: MARSHA CrystalRN  Discharging Hospital Unit/Room#: 5TN-5579/5579-01  Discharging Unit Phone Number: [phone]    Emergency Contact:   Extended Emergency Contact Information  Primary Emergency Contact: Abbi Tracey  Home Phone: [phone]  Relation: Child    Past Surgical History:  History reviewed. No pertinent surgical history.    Immunization History:   Immunization History   Administered Date(s) Administered    COVID-19, PFIZER, (age 12y+), IM, 30mcg/0.3mL 10/03/2023       Active Problems:  Patient Active Problem List   Diagnosis Code    COPD with acute exacerbation (HCC) J44.1    Chronic respiratory failure with hypoxia J96.11    SCOTTIE (obstructive sleep apnea) G47.33       Isolation/Infection:   Isolation            No Isolation          Patient Infection Status       None to display                     Nurse Assessment:  Last Vital Signs: BP (!) 152/64   Pulse 73   Temp 98.1 °F (36.7 °C) (Oral)   Resp 16   Ht 1.753 m (5' 9\")   Wt 98 kg (216 lb)   SpO2 93%   BMI 31.90 kg/m²     Last documented pain score (0-10 scale):    Last Weight:   Wt Readings from Last 1 Encounters:   25 98 kg (216 lb)     Mental Status:  oriented and alert x4    IV Access:  - None    Nursing Mobility/ADLs:  Walking   Assisted  Transfer  Assisted  Bathing  Assisted  Dressing  Assisted  Toileting  Assisted  Feeding  Independent  Med Admin  Independent  Med Delivery   whole    Wound Care Documentation and Therapy:        Elimination:  Continence:   Bowel: Yes  Bladder: Yes  Urinary Catheter: None   Colostomy/Ileostomy/Ileal Conduit: No       Date of Last BM: 25    Intake/Output

## 2025-02-06 NOTE — PROGRESS NOTES
02/06/25 0008   RT Protocol   History Pulmonary Disease 2   Respiratory pattern 2   Breath sounds 2   Cough 0   Indications for Bronchodilator Therapy On home bronchodilators   Bronchodilator Assessment Score 6

## 2025-02-06 NOTE — CARE COORDINATION
Case Management Assessment  Initial Evaluation    Date/Time of Evaluation: 2/6/2025 3:31 PM  Assessment Completed by: MARTA Coleman    If patient is discharged prior to next notation, then this note serves as note for discharge by case management.    Patient Name: Monica Tracey                   YOB: 1945  Diagnosis: Lung nodule [R91.1]  COPD with acute exacerbation (HCC) [J44.1]  Acute respiratory failure with hypoxia [J96.01]                   Date / Time: 2/4/2025 12:58 PM    Patient Admission Status: Inpatient   Readmission Risk (Low < 19, Mod (19-27), High > 27): Readmission Risk Score: 10.2    Current PCP: No primary care provider on file.  PCP verified by CM? Yes    Chart Reviewed: Yes      History Provided by: Patient  Patient Orientation: Alert and Oriented    Patient Cognition: Alert    Hospitalization in the last 30 days (Readmission):  No    If yes, Readmission Assessment in CM Navigator will be completed.    Advance Directives:      Code Status: Full Code   Patient's Primary Decision Maker is: Legal Next of Kin      Discharge Planning:    Patient lives with: Children (Lives with daughter.) Type of Home: House  Primary Care Giver: Self  Patient Support Systems include: Children, Family Members   Current Financial resources: Medicare  Current community resources: None  Current services prior to admission: Oxygen Therapy, Durable Medical Equipment            Current DME: Cpap, Walker            Type of Home Care services:  None    ADLS  Prior functional level: Independent in ADLs/IADLs  Current functional level: Mobility, Assistance with the following:    PT AM-PAC: 22 /24  OT AM-PAC:   /24    Family can provide assistance at DC: Yes  Would you like Case Management to discuss the discharge plan with any other family members/significant others, and if so, who? Yes  Plans to Return to Present Housing: Yes  Other Identified Issues/Barriers to RETURNING to current housing:   Potential

## 2025-02-06 NOTE — CONSULTS
Oziel Chapin MD Responsible Provider: Mercedes Jackman MD    Anesthesia Type: General ASA Status: 3          Anesthesia Type: General    Petros Phase I: Petros Score: 8    Petros Phase II: Petros Score: 10      Anesthesia Post Evaluation    Patient location during evaluation: PACU  Patient participation: complete - patient participated  Level of consciousness: awake and alert  Airway patency: patent  Nausea & Vomiting: no nausea and no vomiting  Complications: no  Cardiovascular status: hemodynamically stable  Respiratory status: acceptable, nonlabored ventilation and spontaneous ventilation  Hydration status: euvolemic  Comments: BP (!) 104/56   Pulse (!) 107   Temp 98 °F (36.7 °C) (Temporal)   Resp 16   Ht 5' 6\" (1.676 m)   Wt 250 lb (113.4 kg)   SpO2 95%   BMI 40.35 kg/m²   Based on chart review, not called for sign out.     Multimodal analgesia pain management approach [facility]  DIVISION OF OTOLARYNGOLOGY- HEAD & NECK SURGERY  CONSULT      Patient Name: Monica Tracey  Medical Record Number:  [id number]  Primary Care Physician:  No primary care provider on file.  Date of Consultation: 2/6/2025    Chief Complaint: Neck pain and tightness      HISTORY OF PRESENT ILLNESS  Monica is a(n) 79 y.o. female who presents neck pain and tightness going on for the past couple weeks.  Overall improving since she has been hospitalized localized in the front of her neck.  Denies voice changes, dysphagia, odynophagia.  No history of neck surgery.  No shortness of breath or noisy breathing.      Patient Active Problem List   Diagnosis    COPD with acute exacerbation (HCC)    Chronic respiratory failure with hypoxia    SCOTTIE (obstructive sleep apnea)     History reviewed. No pertinent surgical history.  History reviewed. No pertinent family history.  Social History     Socioeconomic History    Marital status:      Spouse name: Not on file    Number of children: Not on file    Years of education: Not on file    Highest education level: Not on file   Occupational History    Not on file   Tobacco Use    Smoking status: Former     Types: Cigarettes    Smokeless tobacco: Not on file   Substance and Sexual Activity    Alcohol use: Never    Drug use: Never    Sexual activity: Not Currently   Other Topics Concern    Not on file   Social History Narrative    Not on file     Social Determinants of Health     Financial Resource Strain: Not on file   Food Insecurity: No Food Insecurity (2/5/2025)    Hunger Vital Sign     Worried About Running Out of Food in the Last Year: Never true     Ran Out of Food in the Last Year: Never true   Transportation Needs: No Transportation Needs (2/5/2025)    PRAPARE - Transportation     Lack of Transportation (Medical): No     Lack of Transportation (Non-Medical): No   Physical Activity: Not on file   Stress: Not on file   Social Connections: Not on file   Intimate

## 2025-02-06 NOTE — PLAN OF CARE
Problem: Safety - Adult  Goal: Free from fall injury  2/6/2025 1136 by Sharon Dickerson RN  Outcome: Progressing  2/6/2025 0425 by Jie Esquivel RN  Outcome: Progressing     Problem: Chronic Conditions and Co-morbidities  Goal: Patient's chronic conditions and co-morbidity symptoms are monitored and maintained or improved  2/6/2025 1136 by Sharon Dickerson RN  Outcome: Progressing  2/6/2025 0425 by Jie Esquivel RN  Outcome: Progressing     Problem: Respiratory - Adult  Goal: Achieves optimal ventilation and oxygenation  2/6/2025 1136 by Sharon Dickerson RN  Outcome: Progressing  2/6/2025 0425 by Jie Esquivel RN  Outcome: Progressing     Problem: Infection - Adult  Goal: Absence of infection at discharge  Outcome: Progressing  Goal: Absence of infection during hospitalization  Outcome: Progressing  Goal: Absence of fever/infection during anticipated neutropenic period  Outcome: Progressing

## 2025-02-07 ENCOUNTER — APPOINTMENT (OUTPATIENT)
Dept: MRI IMAGING | Age: 80
End: 2025-02-07
Payer: MEDICARE

## 2025-02-07 PROBLEM — I50.32 CHRONIC DIASTOLIC HEART FAILURE (HCC): Status: ACTIVE | Noted: 2025-02-07

## 2025-02-07 PROBLEM — R06.02 SHORTNESS OF BREATH: Status: ACTIVE | Noted: 2025-02-07

## 2025-02-07 PROBLEM — D50.9 IRON DEFICIENCY ANEMIA: Status: ACTIVE | Noted: 2025-02-07

## 2025-02-07 PROBLEM — J96.01 ACUTE RESPIRATORY FAILURE WITH HYPOXIA: Status: ACTIVE | Noted: 2025-02-07

## 2025-02-07 PROBLEM — J96.01 ACUTE RESPIRATORY FAILURE WITH HYPOXIA (HCC): Status: ACTIVE | Noted: 2025-02-07

## 2025-02-07 LAB
ANION GAP SERPL CALCULATED.3IONS-SCNC: 12 MMOL/L (ref 3–16)
ANION GAP SERPL CALCULATED.3IONS-SCNC: 12 MMOL/L (ref 3–16)
BASOPHILS # BLD: 0 K/UL (ref 0–0.2)
BASOPHILS # BLD: 0 K/UL (ref 0–0.2)
BASOPHILS NFR BLD: 0.2 %
BASOPHILS NFR BLD: 0.2 %
BUN SERPL-MCNC: 16 MG/DL (ref 7–20)
BUN SERPL-MCNC: 16 MG/DL (ref 7–20)
CALCIUM SERPL-MCNC: 9.7 MG/DL (ref 8.3–10.6)
CALCIUM SERPL-MCNC: 9.7 MG/DL (ref 8.3–10.6)
CHLORIDE SERPL-SCNC: 106 MMOL/L (ref 99–110)
CHLORIDE SERPL-SCNC: 106 MMOL/L (ref 99–110)
CO2 SERPL-SCNC: 22 MMOL/L (ref 21–32)
CO2 SERPL-SCNC: 22 MMOL/L (ref 21–32)
CREAT SERPL-MCNC: 0.8 MG/DL (ref 0.6–1.2)
CREAT SERPL-MCNC: 0.8 MG/DL (ref 0.6–1.2)
DEPRECATED RDW RBC AUTO: 16.3 % (ref 12.4–15.4)
DEPRECATED RDW RBC AUTO: 16.3 % (ref 12.4–15.4)
EOSINOPHIL # BLD: 0 K/UL (ref 0–0.6)
EOSINOPHIL # BLD: 0 K/UL (ref 0–0.6)
EOSINOPHIL NFR BLD: 0 %
EOSINOPHIL NFR BLD: 0 %
EST. AVERAGE GLUCOSE BLD GHB EST-MCNC: 134.1 MG/DL
EST. AVERAGE GLUCOSE BLD GHB EST-MCNC: 134.1 MG/DL
EST. AVERAGE GLUCOSE BLD GHB EST-MCNC: 145.6 MG/DL
EST. AVERAGE GLUCOSE BLD GHB EST-MCNC: 145.6 MG/DL
FERRITIN SERPL IA-MCNC: 25.3 NG/ML (ref 15–150)
FERRITIN SERPL IA-MCNC: 25.3 NG/ML (ref 15–150)
FOLATE SERPL-MCNC: 4.59 NG/ML (ref 4.78–24.2)
FOLATE SERPL-MCNC: 4.59 NG/ML (ref 4.78–24.2)
GFR SERPLBLD CREATININE-BSD FMLA CKD-EPI: 75 ML/MIN/{1.73_M2}
GFR SERPLBLD CREATININE-BSD FMLA CKD-EPI: 75 ML/MIN/{1.73_M2}
GLUCOSE BLD-MCNC: 154 MG/DL (ref 70–99)
GLUCOSE BLD-MCNC: 154 MG/DL (ref 70–99)
GLUCOSE BLD-MCNC: 195 MG/DL (ref 70–99)
GLUCOSE BLD-MCNC: 195 MG/DL (ref 70–99)
GLUCOSE BLD-MCNC: 307 MG/DL (ref 70–99)
GLUCOSE BLD-MCNC: 307 MG/DL (ref 70–99)
GLUCOSE BLD-MCNC: 342 MG/DL (ref 70–99)
GLUCOSE BLD-MCNC: 342 MG/DL (ref 70–99)
GLUCOSE SERPL-MCNC: 169 MG/DL (ref 70–99)
GLUCOSE SERPL-MCNC: 169 MG/DL (ref 70–99)
HBA1C MFR BLD: 6.3 %
HBA1C MFR BLD: 6.3 %
HBA1C MFR BLD: 6.7 %
HBA1C MFR BLD: 6.7 %
HCT VFR BLD AUTO: 31.9 % (ref 36–48)
HCT VFR BLD AUTO: 31.9 % (ref 36–48)
HGB BLD-MCNC: 10.2 G/DL (ref 12–16)
HGB BLD-MCNC: 10.2 G/DL (ref 12–16)
IRON SATN MFR SERPL: 5 % (ref 15–50)
IRON SATN MFR SERPL: 5 % (ref 15–50)
IRON SERPL-MCNC: 18 UG/DL (ref 37–145)
IRON SERPL-MCNC: 18 UG/DL (ref 37–145)
LYMPHOCYTES # BLD: 0.6 K/UL (ref 1–5.1)
LYMPHOCYTES # BLD: 0.6 K/UL (ref 1–5.1)
LYMPHOCYTES NFR BLD: 4.6 %
LYMPHOCYTES NFR BLD: 4.6 %
MCH RBC QN AUTO: 26.8 PG (ref 26–34)
MCH RBC QN AUTO: 26.8 PG (ref 26–34)
MCHC RBC AUTO-ENTMCNC: 32 G/DL (ref 31–36)
MCHC RBC AUTO-ENTMCNC: 32 G/DL (ref 31–36)
MCV RBC AUTO: 83.8 FL (ref 80–100)
MCV RBC AUTO: 83.8 FL (ref 80–100)
MONOCYTES # BLD: 0.6 K/UL (ref 0–1.3)
MONOCYTES # BLD: 0.6 K/UL (ref 0–1.3)
MONOCYTES NFR BLD: 4.2 %
MONOCYTES NFR BLD: 4.2 %
NEUTROPHILS # BLD: 12.5 K/UL (ref 1.7–7.7)
NEUTROPHILS # BLD: 12.5 K/UL (ref 1.7–7.7)
NEUTROPHILS NFR BLD: 91 %
NEUTROPHILS NFR BLD: 91 %
PERFORMED ON: ABNORMAL
PLATELET # BLD AUTO: 270 K/UL (ref 135–450)
PLATELET # BLD AUTO: 270 K/UL (ref 135–450)
PMV BLD AUTO: 7.9 FL (ref 5–10.5)
PMV BLD AUTO: 7.9 FL (ref 5–10.5)
POTASSIUM SERPL-SCNC: 4.4 MMOL/L (ref 3.5–5.1)
POTASSIUM SERPL-SCNC: 4.4 MMOL/L (ref 3.5–5.1)
RBC # BLD AUTO: 3.8 M/UL (ref 4–5.2)
RBC # BLD AUTO: 3.8 M/UL (ref 4–5.2)
SODIUM SERPL-SCNC: 140 MMOL/L (ref 136–145)
SODIUM SERPL-SCNC: 140 MMOL/L (ref 136–145)
TIBC SERPL-MCNC: 351 UG/DL (ref 260–445)
TIBC SERPL-MCNC: 351 UG/DL (ref 260–445)
TSH SERPL DL<=0.005 MIU/L-ACNC: 0.65 UIU/ML (ref 0.27–4.2)
TSH SERPL DL<=0.005 MIU/L-ACNC: 0.65 UIU/ML (ref 0.27–4.2)
VIT B12 SERPL-MCNC: 274 PG/ML (ref 211–911)
VIT B12 SERPL-MCNC: 274 PG/ML (ref 211–911)
WBC # BLD AUTO: 13.7 K/UL (ref 4–11)
WBC # BLD AUTO: 13.7 K/UL (ref 4–11)

## 2025-02-07 PROCEDURE — 5A09357 ASSISTANCE WITH RESPIRATORY VENTILATION, LESS THAN 24 CONSECUTIVE HOURS, CONTINUOUS POSITIVE AIRWAY PRESSURE: ICD-10-PCS | Performed by: INTERNAL MEDICINE

## 2025-02-07 PROCEDURE — 94761 N-INVAS EAR/PLS OXIMETRY MLT: CPT

## 2025-02-07 PROCEDURE — 6370000000 HC RX 637 (ALT 250 FOR IP): Performed by: INTERNAL MEDICINE

## 2025-02-07 PROCEDURE — A9577 INJ MULTIHANCE: HCPCS | Performed by: INTERNAL MEDICINE

## 2025-02-07 PROCEDURE — 6360000002 HC RX W HCPCS: Performed by: INTERNAL MEDICINE

## 2025-02-07 PROCEDURE — 80048 BASIC METABOLIC PNL TOTAL CA: CPT

## 2025-02-07 PROCEDURE — 70553 MRI BRAIN STEM W/O & W/DYE: CPT

## 2025-02-07 PROCEDURE — 97530 THERAPEUTIC ACTIVITIES: CPT

## 2025-02-07 PROCEDURE — 2580000003 HC RX 258: Performed by: INTERNAL MEDICINE

## 2025-02-07 PROCEDURE — 2700000000 HC OXYGEN THERAPY PER DAY

## 2025-02-07 PROCEDURE — 6360000004 HC RX CONTRAST MEDICATION: Performed by: INTERNAL MEDICINE

## 2025-02-07 PROCEDURE — 82746 ASSAY OF FOLIC ACID SERUM: CPT

## 2025-02-07 PROCEDURE — 85025 COMPLETE CBC W/AUTO DIFF WBC: CPT

## 2025-02-07 PROCEDURE — 83550 IRON BINDING TEST: CPT

## 2025-02-07 PROCEDURE — 84443 ASSAY THYROID STIM HORMONE: CPT

## 2025-02-07 PROCEDURE — 83540 ASSAY OF IRON: CPT

## 2025-02-07 PROCEDURE — 82728 ASSAY OF FERRITIN: CPT

## 2025-02-07 PROCEDURE — 2500000003 HC RX 250 WO HCPCS: Performed by: INTERNAL MEDICINE

## 2025-02-07 PROCEDURE — 82607 VITAMIN B-12: CPT

## 2025-02-07 PROCEDURE — 99223 1ST HOSP IP/OBS HIGH 75: CPT | Performed by: INTERNAL MEDICINE

## 2025-02-07 PROCEDURE — 36415 COLL VENOUS BLD VENIPUNCTURE: CPT

## 2025-02-07 PROCEDURE — 83036 HEMOGLOBIN GLYCOSYLATED A1C: CPT

## 2025-02-07 PROCEDURE — 94640 AIRWAY INHALATION TREATMENT: CPT

## 2025-02-07 PROCEDURE — 97166 OT EVAL MOD COMPLEX 45 MIN: CPT

## 2025-02-07 PROCEDURE — 1200000000 HC SEMI PRIVATE

## 2025-02-07 RX ORDER — IPRATROPIUM BROMIDE AND ALBUTEROL SULFATE 2.5; .5 MG/3ML; MG/3ML
1 SOLUTION RESPIRATORY (INHALATION)
Status: DISCONTINUED | OUTPATIENT
Start: 2025-02-07 | End: 2025-02-08 | Stop reason: HOSPADM

## 2025-02-07 RX ORDER — ALBUTEROL SULFATE 90 UG/1
2 INHALANT RESPIRATORY (INHALATION) EVERY 6 HOURS PRN
Qty: 18 G | Refills: 3 | Status: SHIPPED | OUTPATIENT
Start: 2025-02-07

## 2025-02-07 RX ORDER — FUROSEMIDE 10 MG/ML
20 INJECTION INTRAMUSCULAR; INTRAVENOUS 2 TIMES DAILY
Status: DISCONTINUED | OUTPATIENT
Start: 2025-02-07 | End: 2025-02-08

## 2025-02-07 RX ORDER — VALSARTAN 80 MG/1
40 TABLET ORAL DAILY
Status: DISCONTINUED | OUTPATIENT
Start: 2025-02-07 | End: 2025-02-08 | Stop reason: HOSPADM

## 2025-02-07 RX ADMIN — SODIUM CHLORIDE, PRESERVATIVE FREE 10 ML: 5 INJECTION INTRAVENOUS at 08:31

## 2025-02-07 RX ADMIN — ARFORMOTEROL TARTRATE 15 MCG: 15 SOLUTION RESPIRATORY (INHALATION) at 20:49

## 2025-02-07 RX ADMIN — IPRATROPIUM BROMIDE AND ALBUTEROL SULFATE 1 DOSE: .5; 3 SOLUTION RESPIRATORY (INHALATION) at 08:44

## 2025-02-07 RX ADMIN — IPRATROPIUM BROMIDE AND ALBUTEROL SULFATE 1 DOSE: .5; 3 SOLUTION RESPIRATORY (INHALATION) at 20:37

## 2025-02-07 RX ADMIN — VALSARTAN 40 MG: 80 TABLET, FILM COATED ORAL at 16:23

## 2025-02-07 RX ADMIN — WATER 40 MG: 1 INJECTION INTRAMUSCULAR; INTRAVENOUS; SUBCUTANEOUS at 05:08

## 2025-02-07 RX ADMIN — BUDESONIDE 500 MCG: 0.5 INHALANT RESPIRATORY (INHALATION) at 20:38

## 2025-02-07 RX ADMIN — WATER 40 MG: 1 INJECTION INTRAMUSCULAR; INTRAVENOUS; SUBCUTANEOUS at 20:27

## 2025-02-07 RX ADMIN — INSULIN LISPRO 2 UNITS: 100 INJECTION, SOLUTION INTRAVENOUS; SUBCUTANEOUS at 17:59

## 2025-02-07 RX ADMIN — INSULIN GLARGINE 15 UNITS: 100 INJECTION, SOLUTION SUBCUTANEOUS at 08:31

## 2025-02-07 RX ADMIN — ENOXAPARIN SODIUM 40 MG: 100 INJECTION SUBCUTANEOUS at 08:31

## 2025-02-07 RX ADMIN — ARFORMOTEROL TARTRATE 15 MCG: 15 SOLUTION RESPIRATORY (INHALATION) at 08:46

## 2025-02-07 RX ADMIN — PANTOPRAZOLE SODIUM 40 MG: 40 TABLET, DELAYED RELEASE ORAL at 05:09

## 2025-02-07 RX ADMIN — SODIUM CHLORIDE, PRESERVATIVE FREE 10 ML: 5 INJECTION INTRAVENOUS at 20:27

## 2025-02-07 RX ADMIN — FUROSEMIDE 20 MG: 10 INJECTION, SOLUTION INTRAMUSCULAR; INTRAVENOUS at 16:23

## 2025-02-07 RX ADMIN — INSULIN LISPRO 6 UNITS: 100 INJECTION, SOLUTION INTRAVENOUS; SUBCUTANEOUS at 13:05

## 2025-02-07 RX ADMIN — LEVOTHYROXINE SODIUM 25 MCG: 0.03 TABLET ORAL at 05:09

## 2025-02-07 RX ADMIN — BUDESONIDE 500 MCG: 0.5 INHALANT RESPIRATORY (INHALATION) at 08:44

## 2025-02-07 RX ADMIN — INSULIN LISPRO 6 UNITS: 100 INJECTION, SOLUTION INTRAVENOUS; SUBCUTANEOUS at 20:44

## 2025-02-07 RX ADMIN — FUROSEMIDE 20 MG: 10 INJECTION, SOLUTION INTRAMUSCULAR; INTRAVENOUS at 13:05

## 2025-02-07 RX ADMIN — GADOBENATE DIMEGLUMINE 20 ML: 529 INJECTION, SOLUTION INTRAVENOUS at 07:27

## 2025-02-07 RX ADMIN — AMLODIPINE BESYLATE 10 MG: 5 TABLET ORAL at 08:31

## 2025-02-07 RX ADMIN — WATER 40 MG: 1 INJECTION INTRAMUSCULAR; INTRAVENOUS; SUBCUTANEOUS at 13:05

## 2025-02-07 RX ADMIN — SODIUM CHLORIDE 125 MG: 9 INJECTION, SOLUTION INTRAVENOUS at 16:23

## 2025-02-07 NOTE — PROGRESS NOTES
[facility] - Inpatient Rehabilitation Department   Phone: [phone]    Occupational Therapy    [x] Initial Evaluation            [] Daily Treatment Note         [] Discharge Summary      Patient: Monica Tracey   : 1945   MRN: [id number]   Date of Service:  2025    Admitting Diagnosis:  COPD with acute exacerbation (HCC)  Current Admission Summary: 79 y.o. female with history of COPD, chronic respiratory failure with hypoxia on 3 L O2 via NC at night, SCOTTIE came to ER with SOB.  Patient had fever to 102 F at home.  Has been SOB for a week.  Worse with exertion.  No LE edema, CP, dysphagia, melena, hematochezia, abdominal pain, HA, dizziness, LH or syncope.  Apparently was recently on Abx for UTI and had some diarrhea.  Otherwise complete ROS is negative unless listed above.                - Respiratory panel all negative - Flu A, B, Covid  Past Medical History:  has no past medical history on file.  Past Surgical History:  has no past surgical history on file.    Discharge Recommendations: Monica Tracey scored a 21/24 on the AM-PAC ADL Inpatient form. Current research shows that an AM-PAC score of 18 or greater is typically associated with a discharge to the patient's home setting. Based on the patient's AM-PAC score, and their current ADL deficits, it is recommended that the patient have 2-3 sessions per week of Occupational Therapy at d/c to increase the patient's independence.  At this time, this patient demonstrates the endurance and safety to discharge home with home services (home vs OP services) and a follow up treatment frequency of 2-3x/wk.   Please see assessment section for further patient specific details.    If patient discharges prior to next session this note will serve as a discharge summary.  Please see below for the latest assessment towards goals.     HOME HEALTH CARE: LEVEL 1 STANDARD    - Initial home health evaluation to occur within 24-48 hours, in patient home   -

## 2025-02-07 NOTE — CONSULTS
PULMONARY AND CRITICAL CARE MEDICINE CONSULTATION NOTE    CONSULTING PHYSICIAN:  Abram Benitez MD    ADMISSION DATE: 2/4/2025  ADMISSION DIAGNOSIS: Lung nodule [R91.1]  COPD with acute exacerbation (HCC) [J44.1]  Acute respiratory failure with hypoxia [J96.01]    REASON FOR CONSULT:   Chief Complaint   Patient presents with    Shortness of Breath     Came by [facility] from Dr office d/juju DUFF for about a week with fever starting last night.        DATE OF CONSULT: 2/4/2025    HISTORY OF PRESENT ILLNESS: 79 y.o. year old female with a past medical history significant for COPD,  history of tobacco abuse, chronic hypoxic respiratory failure on 3 L/min of oxygen, SCOTTIE on CPAP, obesity presented to the hospital with shortness of breath.  Patient reported that she had been feeling short of breath for last few days prior to the hospitalization.  This was mostly exertional.  Not associated with much wheezing or cough.  No discolored expectoration.  Has been taking Breztri inhaler regularly at home.  Occasionally uses albuterol.  Follows with Dr. Jeff Cook [pulmonary] as an outpatient.  Has been diagnosed to have COPD for last 4 years.  Also has been diagnosed to have SCOTTIE for last 3 years and does have CPAP at home.  Has been reporting some pedal edema.  Reports that she has been wearing oxygen only off-and-on.  She did see her oxygen levels drop low with exertion.    REVIEW OF SYSTEMS:     CONSTITUTIONAL SYMPTOMS: The patient denies fever, fatigue, night sweats, weight loss or weight gain.   HEENT: No vision changes. No tinnitus, Denies sinus pain. No hoarseness, or dysphagia.   NECK: Patient denies swelling in the neck.   CARDIOVASCULAR: Denies chest pain, palpitation, syncope.  RESPIRATORY: As per HPI.  GASTROINTESTINAL: Denies nausea, abdominal pain or change in bowel function.  GENITOURINARY: Denies obstructive symptoms. No history of incontinence.  BREASTS: No masses or lumps in the breasts.   SKIN: No  02/05/25 1042     Report SEE IMAGE             IMPRESSION AND PLAN:      COPD of unknown severity and mild exacerbation  Acute on chronic hypoxic respiratory failure  SCOTTIE on CPAP  Possible pulmonary hypertension  Obesity      -Patient presented to the hospital with shortness of breath with exertion.  -Shortness of breath is not associated with significant wheezing, cough or discolored expectoration.  -CT chest done during this admission was personally viewed by me.  It shows pulmonary edema, bilateral pleural effusions as well as dilated pulmonary artery.  -On auscultation I do not hear any wheezing.  There are bibasilar crackles present.  -Patient has been consistently using Breztri inhaler at home.  Rarely uses albuterol.  -Seen to have acute on chronic hypoxic respiratory failure most prominent with ambulation.  -I suspect that this is multifactorial with mild COPD exacerbation, pulmonary edema, generalized fluid overload and pulmonary hypertension contributing.  -Patient has both COPD and SCOTTIE which can predispose her to develop pulmonary hypertension.  -Transthoracic echocardiogram done during this admission showed borderline reduced RV function but RVSP could not be calculated due to incomplete TR jet.  -Patient may need to get a right heart catheterization for accurately measuring PA pressures.  -In the meanwhile I will recommend the patient should be started on Lasix 20 mg IV twice a day for a day or 2.  -She should continue with oxygen supplementation both during the day as well as at nighttime.  At nighttime she has bleeding this into the CPAP therapy.  -Continue with CPAP therapy at nighttime.  -For now she can continue with Brovana, Pulmicort and DuoNeb nebulization.  At the time of discharge can be reverted back to Breztri inhaler.  -I do not hear much wheezing.  Decrease Solu-Medrol to every 12 hours today.  By tomorrow this can be changed to prednisone 40 mg p.o. daily and then slowly tapered off in

## 2025-02-07 NOTE — PROGRESS NOTES
Shift assessment completed. Routine vitals obtained. Scheduled medications given. Patient is awake, alert and oriented. Respirations are deep and unlabored. Patient does not appear to be in distress, resting comfortably at this time. Call light within reach. Fall precautions are in place.

## 2025-02-07 NOTE — DISCHARGE INSTRUCTIONS
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

## 2025-02-07 NOTE — CONSULTS
[facility]  Advanced CHF/Pulmonary Hypertension   Cardiac Evaluation      Monica Tracey  YOB: 1945    Requesting Physician:  Dr. Martínez      Chief Complaint   Patient presents with    Shortness of Breath     Came by [facility] from Dr office d/t OMEGA for about a week with fever starting last night.         History of Present Illness:  Monica Tracey is a 80 yo female who came in from Dr. Hernandez office earlier ths week with shortness of breath for the past week.  The night before admission, she had a fever of 102 degrees.  Denies nausea and vomiting.  Denies chest pain.  She has a history of COPD and has oxygen at home, but does not wear it very often except at night.  Since admission, she has been wearing 4 liters of oxygen.  She sees pulmonary at [facility].  She has history of tobacco abuse, chronic hypoxic respiratory failure, SCOTTIE on CPAP, obesity.  The shortness of breath is mostly exertional.  Denies wheezing or cough.  No sputum production.  She denies significant peripheral edema.      She has been treated for COPD since admission.  Her bnp is 331, slightly elevated.  He has anemia which doesn't help her shortness of breath.  EKG is unremarkable.  Her CT chest shows small right pleural effusion and pulmonary edema.  Echo shows normal LV function.  There is evidence of diastolic HF with abnormal diastolic function.  I am asked to see her for shortness of breath and CHF.  Today she was given IV Lasix and has diuresed some.    Labs:  Sodium 140  K 3.8  BUN/cre 8/0.7  Troponin 13  Bnp 331  Wbc 9.8  H/H 11.1/33.9    CXR:  IMPRESSION:  No acute consolidation.    EKG:  sinus rhythm with occasional PVCs    CT chest:  IMPRESSION:  1. No evidence of pulmonary embolism.  2. Mild cardiomegaly with pulmonary edema and small right pleural effusion.  3. Enlarged main pulmonary artery may be seen with pulmonary arterial  hypertension.  4. Subcarinal lymphadenopathy may be reactive.  5. 4 mm  contour without delay or bruit  JVP less than 8 cm H2O  RRR with nl S1 and S2 without m,r,g  Peripheral pulses are symmetrical and full  There is no clubbing, cyanosis of the extremities.  No edema  Femoral Arteries: 2+ and equal  Pedal Pulses: 2+ and equal   Neck:  No thyromegaly  Abdomen:  No masses or tenderness  Liver/Spleen: No Abnormalities Noted  Neurological/Psychiatric:  Alert and oriented in all spheres  Moves all extremities well  Exhibits normal gait balance and coordination  No abnormalities of mood, affect, memory, mentation, or behavior are noted    Labs were reviewed including labs from other hospital systems through Care Everywhere.  Cardiac testing was reviewed including echos, nuclear scans, cardiac catheterization, including from other hospital systems through Care Everywhere.    Assessment:    1. COPD with acute exacerbation (HCC)    2. Lung nodule    3. Acute respiratory failure with hypoxia    4. Shortness of breath     5.  Chronic diastolic HF  6.  Anemia, rule out iron deficiency    Plan:   Her shortness of breath is likely multifactorial, due to COPD, pulmonary edema, chronic diastolic HF, and anemia.  I do not see any evidence of pulmonary hypertension.  I suggest treating diastolic HF and anemia first before considering something like a RHC.  Agree with IV Lasix  Check iron levels.  If iron deficient, give IV iron.  Consider GI work up if iron deficient (just returned iron deficient, start IV Venofer)  Start valsartan 40 mg po qd (she tells me that her face swelled up with lisinopril in the past)  I will not start Jardiance due to a history of frequent UTIs  Consider starting spironolactone either before or after discharge  She will likely need po lasix on discharge  Repeat BNP level in 2 days  CHF education reinforced.  ~salt restriction  ~fluid restriction  ~medication compliance  ~daily weights and notify of any significant weight gain/loss  ~establish with CHF nurse  ~outpatient

## 2025-02-07 NOTE — PROGRESS NOTES
Hospitalist Progress Note      PCP: No primary care provider on file.    Date of Admission: 2/4/2025    Chief Complaint: SOB    Hospital Course: 79 y.o. female with history of COPD, chronic respiratory failure with hypoxia on 3 L O2 via NC at night, SCOTTIE came to ER with SOB.  Admitted as inpatient for AECOPD.  Started on IV steroids, nebs and IV Abx.  Viral panel negative.     Complained of neck/throat pain.  ENT consulted.    Pulm consulted for SOB.      CT neck:  IMPRESSION:  1. Indeterminate 1.7 cm mass along the floor of the anterior cranial fossa  subjacent to the left frontal lobe suggestive of a meningioma.  An MRI of the  brain with and without contrast is recommended for further evaluation of this  finding.  2. No acute abnormality, soft tissue mass or lymphadenopathy identified  within the neck.    Neurosurgery consulted for intracranial mass.      Subjective:    Patient seen and examined.  Still with episodes of oxygen desaturation with minimal activity.   No chest pain, fever or chills.   Stable dyspnea and cough.    Medications:  Reviewed    Infusion Medications    dextrose      sodium chloride       Scheduled Medications    furosemide  20 mg IntraVENous BID    ipratropium 0.5 mg-albuterol 2.5 mg  1 Dose Inhalation TID RT    levothyroxine  25 mcg Oral QAM AC    amLODIPine  10 mg Oral Daily    methylPREDNISolone  40 mg IntraVENous Q8H    insulin glargine  15 Units SubCUTAneous Daily    insulin lispro  0-8 Units SubCUTAneous 4x Daily AC & HS    pantoprazole  40 mg Oral QAM AC    sodium chloride flush  5-40 mL IntraVENous 2 times per day    enoxaparin  40 mg SubCUTAneous Daily    arformoterol tartrate  15 mcg Nebulization BID RT    budesonide  0.5 mg Nebulization BID RT     PRN Meds: ipratropium 0.5 mg-albuterol 2.5 mg, dextrose bolus **OR** dextrose bolus, glucagon (rDNA), dextrose, aluminum & magnesium hydroxide-simethicone, sodium chloride flush, sodium chloride, potassium chloride **OR** potassium  metformin.  Monitor on basal insulin and SSI.  Hypoglycemia orders.    Nausea with vomiting: Resolved  No diarrhea  Protonix  Maalox PRN.    SCOTTIE  Continue CPAP qhs.    Abnormal brain imaging:  A. 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  Neurosurgery consulted: Recommended outpatient follow-up with Dr. Jermeiah Del Rosario.    Hypothyroidism  A. Continue Synthroid. TSH wnl.    Normocytic Anemia  A. Follow up work up.    DVT Prophylaxis: Lovenox  Diet: ADULT DIET; Regular  Code Status: Full Code  PT/OT Eval Status: Following    Dispo - Home with home PT/OT when medically stable    Discussed with patient, nursing and CM.      Tahmina Martínez MD

## 2025-02-07 NOTE — PLAN OF CARE
Problem: Safety - Adult  Goal: Free from fall injury  Outcome: Progressing     Problem: Chronic Conditions and Co-morbidities  Goal: Patient's chronic conditions and co-morbidity symptoms are monitored and maintained or improved  Outcome: Progressing     Problem: Respiratory - Adult  Goal: Achieves optimal ventilation and oxygenation  Outcome: Progressing     Problem: Infection - Adult  Goal: Absence of infection at discharge  Outcome: Progressing  Goal: Absence of infection during hospitalization  Outcome: Progressing  Goal: Absence of fever/infection during anticipated neutropenic period  Outcome: Progressing

## 2025-02-07 NOTE — CONSULTS
NRSG Consult Note    Patient presented to [facility] ER with neck pain, otherwise neurologically at baseline. 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 These are both likely incidental findings and can be followed up outpatient. No inpatient requirements. Recommend follow up with one of our cranial providers like Dr. Jeremiah Del Rosario for further recs.    Jack Haro MD  [facility]

## 2025-02-08 VITALS
WEIGHT: 228.4 LBS | BODY MASS INDEX: 33.83 KG/M2 | HEART RATE: 66 BPM | OXYGEN SATURATION: 94 % | DIASTOLIC BLOOD PRESSURE: 63 MMHG | WEIGHT: 228.4 LBS | DIASTOLIC BLOOD PRESSURE: 63 MMHG | HEIGHT: 69 IN | RESPIRATION RATE: 18 BRPM | TEMPERATURE: 97.4 F | HEART RATE: 66 BPM | BODY MASS INDEX: 33.83 KG/M2 | SYSTOLIC BLOOD PRESSURE: 115 MMHG | TEMPERATURE: 97.4 F | RESPIRATION RATE: 18 BRPM | HEIGHT: 69 IN | SYSTOLIC BLOOD PRESSURE: 115 MMHG | OXYGEN SATURATION: 94 %

## 2025-02-08 LAB
ANION GAP SERPL CALCULATED.3IONS-SCNC: 15 MMOL/L (ref 3–16)
ANION GAP SERPL CALCULATED.3IONS-SCNC: 15 MMOL/L (ref 3–16)
BASOPHILS # BLD: 0.1 K/UL (ref 0–0.2)
BASOPHILS # BLD: 0.1 K/UL (ref 0–0.2)
BASOPHILS NFR BLD: 0.4 %
BASOPHILS NFR BLD: 0.4 %
BUN SERPL-MCNC: 18 MG/DL (ref 7–20)
BUN SERPL-MCNC: 18 MG/DL (ref 7–20)
CALCIUM SERPL-MCNC: 9.7 MG/DL (ref 8.3–10.6)
CALCIUM SERPL-MCNC: 9.7 MG/DL (ref 8.3–10.6)
CHLORIDE SERPL-SCNC: 98 MMOL/L (ref 99–110)
CHLORIDE SERPL-SCNC: 98 MMOL/L (ref 99–110)
CO2 SERPL-SCNC: 22 MMOL/L (ref 21–32)
CO2 SERPL-SCNC: 22 MMOL/L (ref 21–32)
CREAT SERPL-MCNC: 0.9 MG/DL (ref 0.6–1.2)
CREAT SERPL-MCNC: 0.9 MG/DL (ref 0.6–1.2)
DEPRECATED RDW RBC AUTO: 16.1 % (ref 12.4–15.4)
DEPRECATED RDW RBC AUTO: 16.1 % (ref 12.4–15.4)
EOSINOPHIL # BLD: 0 K/UL (ref 0–0.6)
EOSINOPHIL # BLD: 0 K/UL (ref 0–0.6)
EOSINOPHIL NFR BLD: 0 %
EOSINOPHIL NFR BLD: 0 %
GFR SERPLBLD CREATININE-BSD FMLA CKD-EPI: 65 ML/MIN/{1.73_M2}
GFR SERPLBLD CREATININE-BSD FMLA CKD-EPI: 65 ML/MIN/{1.73_M2}
GLUCOSE BLD-MCNC: 176 MG/DL (ref 70–99)
GLUCOSE BLD-MCNC: 176 MG/DL (ref 70–99)
GLUCOSE BLD-MCNC: 187 MG/DL (ref 70–99)
GLUCOSE BLD-MCNC: 187 MG/DL (ref 70–99)
GLUCOSE BLD-MCNC: 226 MG/DL (ref 70–99)
GLUCOSE BLD-MCNC: 226 MG/DL (ref 70–99)
GLUCOSE SERPL-MCNC: 273 MG/DL (ref 70–99)
GLUCOSE SERPL-MCNC: 273 MG/DL (ref 70–99)
HCT VFR BLD AUTO: 35.1 % (ref 36–48)
HCT VFR BLD AUTO: 35.1 % (ref 36–48)
HGB BLD-MCNC: 11.5 G/DL (ref 12–16)
HGB BLD-MCNC: 11.5 G/DL (ref 12–16)
LYMPHOCYTES # BLD: 0.4 K/UL (ref 1–5.1)
LYMPHOCYTES # BLD: 0.4 K/UL (ref 1–5.1)
LYMPHOCYTES NFR BLD: 3.1 %
LYMPHOCYTES NFR BLD: 3.1 %
MCH RBC QN AUTO: 27.1 PG (ref 26–34)
MCH RBC QN AUTO: 27.1 PG (ref 26–34)
MCHC RBC AUTO-ENTMCNC: 32.8 G/DL (ref 31–36)
MCHC RBC AUTO-ENTMCNC: 32.8 G/DL (ref 31–36)
MCV RBC AUTO: 82.4 FL (ref 80–100)
MCV RBC AUTO: 82.4 FL (ref 80–100)
MONOCYTES # BLD: 0.4 K/UL (ref 0–1.3)
MONOCYTES # BLD: 0.4 K/UL (ref 0–1.3)
MONOCYTES NFR BLD: 3.3 %
MONOCYTES NFR BLD: 3.3 %
NEUTROPHILS # BLD: 11.6 K/UL (ref 1.7–7.7)
NEUTROPHILS # BLD: 11.6 K/UL (ref 1.7–7.7)
NEUTROPHILS NFR BLD: 93.2 %
NEUTROPHILS NFR BLD: 93.2 %
PERFORMED ON: ABNORMAL
PLATELET # BLD AUTO: 274 K/UL (ref 135–450)
PLATELET # BLD AUTO: 274 K/UL (ref 135–450)
PMV BLD AUTO: 7.6 FL (ref 5–10.5)
PMV BLD AUTO: 7.6 FL (ref 5–10.5)
POTASSIUM SERPL-SCNC: 3.8 MMOL/L (ref 3.5–5.1)
POTASSIUM SERPL-SCNC: 3.8 MMOL/L (ref 3.5–5.1)
RBC # BLD AUTO: 4.25 M/UL (ref 4–5.2)
RBC # BLD AUTO: 4.25 M/UL (ref 4–5.2)
SODIUM SERPL-SCNC: 135 MMOL/L (ref 136–145)
SODIUM SERPL-SCNC: 135 MMOL/L (ref 136–145)
WBC # BLD AUTO: 12.5 K/UL (ref 4–11)
WBC # BLD AUTO: 12.5 K/UL (ref 4–11)

## 2025-02-08 PROCEDURE — 85025 COMPLETE CBC W/AUTO DIFF WBC: CPT

## 2025-02-08 PROCEDURE — 6360000002 HC RX W HCPCS: Performed by: INTERNAL MEDICINE

## 2025-02-08 PROCEDURE — 2500000003 HC RX 250 WO HCPCS: Performed by: INTERNAL MEDICINE

## 2025-02-08 PROCEDURE — 99233 SBSQ HOSP IP/OBS HIGH 50: CPT | Performed by: INTERNAL MEDICINE

## 2025-02-08 PROCEDURE — 6370000000 HC RX 637 (ALT 250 FOR IP): Performed by: INTERNAL MEDICINE

## 2025-02-08 PROCEDURE — 94640 AIRWAY INHALATION TREATMENT: CPT

## 2025-02-08 PROCEDURE — 80048 BASIC METABOLIC PNL TOTAL CA: CPT

## 2025-02-08 PROCEDURE — 2700000000 HC OXYGEN THERAPY PER DAY

## 2025-02-08 PROCEDURE — 94761 N-INVAS EAR/PLS OXIMETRY MLT: CPT

## 2025-02-08 PROCEDURE — 36415 COLL VENOUS BLD VENIPUNCTURE: CPT

## 2025-02-08 RX ORDER — FERROUS SULFATE 325(65) MG
325 TABLET ORAL 2 TIMES DAILY WITH MEALS
Status: DISCONTINUED | OUTPATIENT
Start: 2025-02-08 | End: 2025-02-08 | Stop reason: HOSPADM

## 2025-02-08 RX ORDER — FUROSEMIDE 20 MG/1
20 TABLET ORAL DAILY
Status: DISCONTINUED | OUTPATIENT
Start: 2025-02-09 | End: 2025-02-08 | Stop reason: HOSPADM

## 2025-02-08 RX ORDER — FUROSEMIDE 20 MG/1
20 TABLET ORAL DAILY
Qty: 60 TABLET | Refills: 3 | Status: SHIPPED | OUTPATIENT
Start: 2025-02-08

## 2025-02-08 RX ORDER — GLIMEPIRIDE 1 MG/1
1 TABLET ORAL
Qty: 90 TABLET | Refills: 1 | Status: SHIPPED | OUTPATIENT
Start: 2025-02-08

## 2025-02-08 RX ORDER — VALSARTAN 40 MG/1
40 TABLET ORAL DAILY
Qty: 30 TABLET | Refills: 3 | Status: SHIPPED | OUTPATIENT
Start: 2025-02-09

## 2025-02-08 RX ORDER — FERROUS SULFATE 325(65) MG
325 TABLET ORAL 2 TIMES DAILY WITH MEALS
Qty: 30 TABLET | Refills: 3 | Status: SHIPPED | OUTPATIENT
Start: 2025-02-08

## 2025-02-08 RX ORDER — PREDNISONE 10 MG/1
30 TABLET ORAL DAILY
Qty: 13 TABLET | Refills: 0 | Status: SHIPPED | OUTPATIENT
Start: 2025-02-09 | End: 2025-02-19

## 2025-02-08 RX ORDER — POTASSIUM CHLORIDE 750 MG/1
10 TABLET, EXTENDED RELEASE ORAL DAILY
Qty: 30 TABLET | Refills: 5 | Status: SHIPPED | OUTPATIENT
Start: 2025-02-08

## 2025-02-08 RX ADMIN — BUDESONIDE 500 MCG: 0.5 INHALANT RESPIRATORY (INHALATION) at 08:02

## 2025-02-08 RX ADMIN — IPRATROPIUM BROMIDE AND ALBUTEROL SULFATE 1 DOSE: .5; 3 SOLUTION RESPIRATORY (INHALATION) at 08:02

## 2025-02-08 RX ADMIN — AMLODIPINE BESYLATE 10 MG: 5 TABLET ORAL at 09:57

## 2025-02-08 RX ADMIN — INSULIN GLARGINE 15 UNITS: 100 INJECTION, SOLUTION SUBCUTANEOUS at 09:56

## 2025-02-08 RX ADMIN — INSULIN LISPRO 2 UNITS: 100 INJECTION, SOLUTION INTRAVENOUS; SUBCUTANEOUS at 13:17

## 2025-02-08 RX ADMIN — WATER 40 MG: 1 INJECTION INTRAMUSCULAR; INTRAVENOUS; SUBCUTANEOUS at 06:04

## 2025-02-08 RX ADMIN — SODIUM CHLORIDE, PRESERVATIVE FREE 10 ML: 5 INJECTION INTRAVENOUS at 10:03

## 2025-02-08 RX ADMIN — PANTOPRAZOLE SODIUM 40 MG: 40 TABLET, DELAYED RELEASE ORAL at 06:05

## 2025-02-08 RX ADMIN — ARFORMOTEROL TARTRATE 15 MCG: 15 SOLUTION RESPIRATORY (INHALATION) at 08:02

## 2025-02-08 RX ADMIN — LEVOTHYROXINE SODIUM 25 MCG: 0.03 TABLET ORAL at 06:05

## 2025-02-08 RX ADMIN — ENOXAPARIN SODIUM 40 MG: 100 INJECTION SUBCUTANEOUS at 09:56

## 2025-02-08 RX ADMIN — FUROSEMIDE 20 MG: 10 INJECTION, SOLUTION INTRAMUSCULAR; INTRAVENOUS at 10:01

## 2025-02-08 RX ADMIN — VALSARTAN 40 MG: 80 TABLET, FILM COATED ORAL at 09:57

## 2025-02-08 NOTE — PROGRESS NOTES
Writer went over all discharge instructions with patient and she verbalized understanding,pt given copy of DALE.

## 2025-02-08 NOTE — PROGRESS NOTES
Spiritual Health History and Assessment/Progress Note  [facility]    Initial Encounter, Spiritual/Emotional Needs,  ,  ,      Name: Monica Tracey MRN: [id number]    Age: 79 y.o.     Sex: female   Language: English   Cheondoism: Denominational   COPD with acute exacerbation (HCC)     Date: 2/8/2025            Total Time Calculated: 60 min              [facility]        Referral/Consult From: Nurse   Encounter Overview/Reason: Initial Encounter, Spiritual/Emotional Needs  Service Provided For: Patient and family together (Patient's daughter Mike came in the room about half way through the encounter.)    Background:  visited patient & her daughter (Jenn) at bedside in response to nurse consult. Received patient sitting in bed watching television. Room was moderately lit with artificial light in the room and from the television playing softly in the background. Patient was alert and oriented and presented with a pleasant demeanor. She easily engaged in conversation. About midway through the visit, patient's daughter showed up and participated in the visit.    Zoila, Belief, Meaning:   Patient is connected with a zoila tradition or spiritual practice and has beliefs or practices that help with coping during difficult times. She identifies as a Denominational.  Family/Friends are connected with a zoila tradition or spiritual practice and have beliefs or practices that help with coping during difficult times      Importance and Influence:  Patient has no beliefs influential to healthcare decision-making identified during this visit.  Family/Friends have no beliefs influential to healthcare decision-making identified during this visit.    Community:  Patient is connected with a spiritual community and feels well-supported. Support system includes: Children. She lives with her daughter. They are remotely affiliated with a [employer] in [place] called the [facility].

## 2025-02-08 NOTE — PLAN OF CARE
Problem: Safety - Adult  Goal: Free from fall injury  2/8/2025 1505 by Dick Yuen RN  Outcome: Completed  2/8/2025 0331 by Barry Currie RN  Outcome: Progressing     Problem: Chronic Conditions and Co-morbidities  Goal: Patient's chronic conditions and co-morbidity symptoms are monitored and maintained or improved  2/8/2025 1505 by Dick Yuen RN  Outcome: Completed  Flowsheets  Taken 2/8/2025 1404  Care Plan - Patient's Chronic Conditions and Co-Morbidity Symptoms are Monitored and Maintained or Improved: Monitor and assess patient's chronic conditions and comorbid symptoms for stability, deterioration, or improvement  Taken 2/8/2025 0915  Care Plan - Patient's Chronic Conditions and Co-Morbidity Symptoms are Monitored and Maintained or Improved: Monitor and assess patient's chronic conditions and comorbid symptoms for stability, deterioration, or improvement  2/8/2025 0331 by Barry Currie RN  Outcome: Progressing     Problem: Respiratory - Adult  Goal: Achieves optimal ventilation and oxygenation  2/8/2025 1505 by Dick Yuen RN  Outcome: Completed  2/8/2025 0331 by Barry Currie RN  Outcome: Progressing     Problem: Infection - Adult  Goal: Absence of infection at discharge  2/8/2025 1505 by Dick Yuen RN  Outcome: Completed  Flowsheets  Taken 2/8/2025 1404  Absence of infection at discharge: Assess and monitor for signs and symptoms of infection  Taken 2/8/2025 0915  Absence of infection at discharge: Assess and monitor for signs and symptoms of infection  2/8/2025 0331 by Barry Currie RN  Outcome: Progressing  Goal: Absence of infection during hospitalization  2/8/2025 1505 by Dick Yuen RN  Outcome: Completed  Flowsheets  Taken 2/8/2025 1404  Absence of infection during hospitalization: Assess and monitor for signs and symptoms of infection  Taken 2/8/2025 0915  Absence of infection during hospitalization: Assess and monitor for signs and symptoms of

## 2025-02-08 NOTE — CARE COORDINATION
Case Management -  Discharge Note      Patient Name: Monica Tracey                   YOB: 1945  [facility]            Readmission Risk (Low < 19, Mod (19-27), High > 27): Readmission Risk Score: 9    Current PCP: No primary care provider on file.      (IMM) Important Message from Medicare:    Has pt received appropriate compliance notices before being discharged if required: yes  Compliance doc:  [x] 2nd IMM; [] Code 44 [] [place]  Date Given: 2/08/2024 Given By: Belen    PT AM-PAC: 22 /24  OT AM-PAC: 21 /24    Home Care Information:   Is patient resuming current home health care services: No    Home Care Agency:   [facility] [address],   [place] [address]  Phone: [phone]  Fax: [phone]              Services: PT/OT/Nursing  Home Health Order Obtained: Yes    Home health agency notified of discharge. CM called Lesley [phone] and provided her with the referral and she accepted the Patient she is aware of D/C home today.     Financial    Payor: [facility] MEDICARE / Plan: [facility] MEDICARE / Product Type: *No Product type* /     Pharmacy:  Potential assistance Purchasing Medications: No  Meds-to-Beds request:        [facility] [id number] - [place], [place] - [address] - P [phone] - F [phone]  [address] [address]  Phone: [phone] Fax: [phone]      Notes:    Additional Case Management Notes: Pt advised having no preference in HHC agency. CM called Lesley with [facility] who accepted the Patient.     Family or Friend to transport home.       Electronically signed by MARTA Burgos on 2/8/2025 at 2:59 PM

## 2025-02-08 NOTE — PLAN OF CARE
Problem: Safety - Adult  Goal: Free from fall injury  2/8/2025 0331 by Barry Currie RN  Outcome: Progressing  2/7/2025 1432 by Sharon Dickerson RN  Outcome: Progressing     Problem: Chronic Conditions and Co-morbidities  Goal: Patient's chronic conditions and co-morbidity symptoms are monitored and maintained or improved  2/8/2025 0331 by Barry Currie RN  Outcome: Progressing  2/7/2025 1432 by Sharon Dickerson RN  Outcome: Progressing     Problem: Respiratory - Adult  Goal: Achieves optimal ventilation and oxygenation  2/8/2025 0331 by Barry Currie RN  Outcome: Progressing  2/7/2025 1432 by Sharon Dickerson RN  Outcome: Progressing     Problem: Infection - Adult  Goal: Absence of infection at discharge  2/8/2025 0331 by Barry Currie RN  Outcome: Progressing  2/7/2025 1432 by Sharon Dickerson RN  Outcome: Progressing  Goal: Absence of infection during hospitalization  2/8/2025 0331 by Barry Currie RN  Outcome: Progressing  2/7/2025 1432 by Sharon Dickerson RN  Outcome: Progressing  Goal: Absence of fever/infection during anticipated neutropenic period  2/8/2025 0331 by Barry Currie RN  Outcome: Progressing  2/7/2025 1432 by Sharon Dickerson RN  Outcome: Progressing

## 2025-02-08 NOTE — DISCHARGE SUMMARY
Patient: Monica Tracey     Gender: female  : 1945   Age: 79 y.o.  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-up        Active Hospital Problems    Diagnosis Date Noted    Acute respiratory failure with hypoxia [J96.01] 2025    Shortness of breath [R06.02] 2025    Chronic diastolic heart failure (HCC) [I50.32] 2025    Iron deficiency anemia [D50.9] 2025    COPD with acute exacerbation (HCC) [J44.1] 2025    Chronic respiratory failure with hypoxia [J96.11] 2025    SCOTTIE (obstructive sleep apnea) [G47.33] 2025       Follow-up appointments:  one week    Outpatient to do list: Needs follow-up with pulmonary cardiology and with neurosurgery for pituitary macroadenoma      Condition at Discharge:  Stable    Hospital Course:   79 y.o. female with history of COPD, chronic respiratory failure with hypoxia on 3 L O2 via NC at night, SCOTTIE came to ER with SOB. Admitted as inpatient for AECOPD. Started on IV steroids, nebs and IV Abx. Viral panel negative.     COPD not in acute exacerbation  Pulm consult appreciated: Discontinue IV steroids.  Continue bronchodilators  Robitussin DM PRN  Negative viral panel  Changed over to oral steroids and will taper  Back on baseline oxygen     Acute on chronic respiratory failure with hypoxia due to CHF  Patient on 3 L O2 via NC at night at baseline.  TTE 2024: LVEF 65 to 70%.  Elevated LV filling pressure.  RV dilated with low normal systolic function. Cardiology  without any focal sensory/motor deficits. Cranial nerves: II-XII intact, grossly non-focal.    Labs: For convenience and continuity at follow-up the following most recent labs are provided:    Lab Results   Component Value Date/Time    WBC 12.5 02/08/2025 11:23 AM    HGB 11.5 02/08/2025 11:23 AM    HCT 35.1 02/08/2025 11:23 AM    MCV 82.4 02/08/2025 11:23 AM     02/08/2025 11:23 AM     02/08/2025 11:23 AM    K 3.8 02/08/2025 11:23 AM    CL 98 02/08/2025 11:23 AM    CO2 22 02/08/2025 11:23 AM    BUN 18 02/08/2025 11:23 AM    CREATININE 0.9 02/08/2025 11:23 AM    CALCIUM 9.7 02/08/2025 11:23 AM    ALKPHOS 104 02/04/2025 02:00 PM    ALT 21 02/04/2025 02:00 PM    AST 18 02/04/2025 02:00 PM    BILITOT 0.4 02/04/2025 02:00 PM     No results found for: \"INR\"        The patient was seen and examined on day of discharge and this discharge summary is in conjunction with any daily progress note from day of discharge.Time Spent on discharge is 45 minutes  in the examination, evaluation, counseling and review of medications and discharge plan.      Note that greater  than 30 minutes was spent in preparing discharge papers, discussing discharge with patient, medication review, etc.       Signed:    Hyacinth Kent MD   2/8/2025      Thank you No primary care provider on file. for the opportunity to be involved in this patient's care. If you have any questions or concerns please feel free to contact me

## 2025-02-08 NOTE — PROGRESS NOTES
PULMONARY AND CRITICAL CARE MEDICINE PROGRESS NOTE      SUBJECTIVE: Patient states improved shortness of breath.  Denies any cough or wheezing or chest pain or hemoptysis.  On 3 L nasal cannula and saturating well.    REVIEW OF SYSTEMS:   CONSTITUTIONAL SYMPTOMS: The patient denies fever, fatigue, night sweats, weight loss or weight gain.   HEENT: No vision changes. No tinnitus, Denies sinus pain. No hoarseness, or dysphagia.   CARDIOVASCULAR: Denies chest pain, palpitation, syncope.  RESPIRATORY: See above  GASTROINTESTINAL: Denies nausea, abdominal pain or change in bowel function.  SKIN: No rashes or itching.   MUSCULOSKELETAL: Denies weakness or bone pain.   NEUROLOGICAL: No headaches or seizures.     MEDICATIONS:      furosemide  20 mg IntraVENous BID    ipratropium 0.5 mg-albuterol 2.5 mg  1 Dose Inhalation BID RT    valsartan  40 mg Oral Daily    ferric gluconate  125 mg IntraVENous Daily    levothyroxine  25 mcg Oral QAM AC    amLODIPine  10 mg Oral Daily    methylPREDNISolone  40 mg IntraVENous Q8H    insulin glargine  15 Units SubCUTAneous Daily    insulin lispro  0-8 Units SubCUTAneous 4x Daily AC & HS    pantoprazole  40 mg Oral QAM AC    sodium chloride flush  5-40 mL IntraVENous 2 times per day    enoxaparin  40 mg SubCUTAneous Daily    arformoterol tartrate  15 mcg Nebulization BID RT    budesonide  0.5 mg Nebulization BID RT      dextrose      sodium chloride       ipratropium 0.5 mg-albuterol 2.5 mg, dextrose bolus **OR** dextrose bolus, glucagon (rDNA), dextrose, aluminum & magnesium hydroxide-simethicone, sodium chloride flush, sodium chloride, potassium chloride **OR** potassium alternative oral replacement **OR** potassium chloride, magnesium sulfate, ondansetron **OR** ondansetron, polyethylene glycol, acetaminophen **OR** acetaminophen, guaiFENesin-dextromethorphan     ALLERGIES:   Allergies as of 02/04/2025 - Fully Reviewed 02/04/2025   Allergen Reaction Noted    Aspirin Other (See

## 2025-02-08 NOTE — CARE COORDINATION
02/08/25 1536   IMM Letter   IMM Letter given to Patient/Family/Significant other/Guardian/POA/by: CM provided IMM to patient. Patient to discharge home today   IMM Letter date given: 02/08/25   IMM Letter time given: 1536       Electronically signed by Snaiya Bernal RN on 2/8/2025 at 3:47 PM

## 2025-02-11 ENCOUNTER — OFFICE VISIT (OUTPATIENT)
Dept: PRIMARY CARE CLINIC | Age: 80
End: 2025-02-11

## 2025-02-11 VITALS
SYSTOLIC BLOOD PRESSURE: 112 MMHG | DIASTOLIC BLOOD PRESSURE: 62 MMHG | TEMPERATURE: 98 F | HEART RATE: 68 BPM | BODY MASS INDEX: 37.25 KG/M2 | OXYGEN SATURATION: 96 % | HEIGHT: 65 IN | WEIGHT: 223.6 LBS | RESPIRATION RATE: 16 BRPM

## 2025-02-11 DIAGNOSIS — J43.2 CENTRILOBULAR EMPHYSEMA (HCC): Chronic | ICD-10-CM

## 2025-02-11 DIAGNOSIS — D32.9 MENINGIOMA (HCC): ICD-10-CM

## 2025-02-11 DIAGNOSIS — D50.9 CHRONIC IRON DEFICIENCY ANEMIA: ICD-10-CM

## 2025-02-11 DIAGNOSIS — D36.9 MACROADENOMA: ICD-10-CM

## 2025-02-11 DIAGNOSIS — E11.8 TYPE 2 DIABETES MELLITUS WITH COMPLICATION, WITHOUT LONG-TERM CURRENT USE OF INSULIN (HCC): Primary | ICD-10-CM

## 2025-02-11 DIAGNOSIS — I27.20 PULMONARY HYPERTENSION (HCC): ICD-10-CM

## 2025-02-11 RX ORDER — POTASSIUM CHLORIDE 750 MG/1
TABLET, EXTENDED RELEASE ORAL
COMMUNITY
Start: 2025-02-08

## 2025-02-11 RX ORDER — GLIMEPIRIDE 1 MG/1
TABLET ORAL
COMMUNITY
Start: 2025-02-08

## 2025-02-11 RX ORDER — FUROSEMIDE 20 MG/1
TABLET ORAL
COMMUNITY
Start: 2025-02-08

## 2025-02-11 RX ORDER — PREDNISONE 10 MG/1
10 TABLET ORAL
Qty: 30 TABLET | Refills: 0 | Status: SHIPPED | OUTPATIENT
Start: 2025-02-11 | End: 2025-02-21

## 2025-02-11 RX ORDER — VALSARTAN 40 MG/1
TABLET ORAL
COMMUNITY
Start: 2025-02-08

## 2025-02-11 ASSESSMENT — ENCOUNTER SYMPTOMS
EYES NEGATIVE: 1
CONSTIPATION: 0
ALLERGIC/IMMUNOLOGIC NEGATIVE: 1
SORE THROAT: 0
WHEEZING: 0
CHEST TIGHTNESS: 0
DIARRHEA: 0
NAUSEA: 0
VOMITING: 0
RHINORRHEA: 0
TROUBLE SWALLOWING: 0
COUGH: 0
SHORTNESS OF BREATH: 1

## 2025-02-11 NOTE — PROGRESS NOTES
Post-Discharge Transitional Care Management Services      Monica Tracey   YOB: 1945    Date of Visit:  2/11/2025  30 Day Post-Discharge Date: 2/7/2025    Allergies   Allergen Reactions    Lisinopril Swelling    Aspirin Other (See Comments)     Ulcers    Lipitor      CPK    Metformin Other (See Comments)     Mild metabolic acidosis    Nsaids      Bleeding ulcers     Outpatient Medications Marked as Taking for the 2/11/25 encounter (Office Visit) with Tarsha Hernandez MD   Medication Sig Dispense Refill    glimepiride (AMARYL) 1 MG tablet       valsartan (DIOVAN) 40 MG tablet       furosemide (LASIX) 20 MG tablet       potassium chloride (KLOR-CON M) 10 MEQ extended release tablet       predniSONE (DELTASONE) 10 MG tablet Take 1 tablet by mouth 3 times daily (with meals) for 10 days 30 tablet 0    Budeson-Glycopyrrol-Formoterol (BREZTRI AEROSPHERE) 160-9-4.8 MCG/ACT AERO Inhale 2 puffs into the lungs in the morning and 2 puffs in the evening. Maintenance inhaler. 1 each 12    Budeson-Glycopyrrol-Formoterol (BREZTRI AEROSPHERE) 160-9-4.8 MCG/ACT AERO Inhale 2 puffs into the lungs in the morning and 2 puffs in the evening. 1 each 12    levothyroxine (SYNTHROID) 25 MCG tablet TAKE 1 TABLET BY MOUTH DAILY 90 tablet 3    simvastatin (ZOCOR) 40 MG tablet TAKE ONE TABLET BY MOUTH ONCE NIGHTLY 90 tablet 3    blood glucose test strips (ONETOUCH ULTRA) strip USE TO TEST TWO TIMES A  strip 12    vitamin D (D3-1000) 25 MCG (1000 UT) TABS tablet Take 2 tablets by mouth daily 180 tablet 3    albuterol sulfate HFA (VENTOLIN HFA) 108 (90 Base) MCG/ACT inhaler Inhale 2 puffs into the lungs 4 times daily as needed for Wheezing (emergency inhaler) 18 g 0         Vitals:    02/11/25 1459   BP: 112/62   Pulse: 68   Resp: 16   Temp: 98 °F (36.7 °C)   TempSrc: Tympanic   SpO2: 96%   Weight: 101.4 kg (223 lb 9.6 oz)   Height: 1.651 m (5' 5\")     Body mass index is 37.21 kg/m².     Wt Readings from Last 3

## 2025-02-12 ENCOUNTER — TELEPHONE (OUTPATIENT)
Dept: CARDIOLOGY CLINIC | Age: 80
End: 2025-02-12

## 2025-02-12 NOTE — TELEPHONE ENCOUNTER
New Patient Self Referral - Heart Failure    Best Contact Number: daughter Jenn, [phone] OR Monica at [phone]    Preferred Language: English    PCP: Tarsha Hernandez    Previous Cardiologist: No    Reason for Visit:  referred to [facility], by the hospitalist upon her recent discharge 2/8/25 from [facility]      Cardiac Testing: (This chart  marked for merge- see other chart for most recent)    EKG: (Yes or No)  if yes, where and when: 2/8/25 while in hospital, [facility]    Stress Test: (Yes or No)  if yes, where and when    uncertain, 2023    Echocardiogram: (Yes or No)  if yes, where and when 2/6/2025 [facility]    Angiogram: (Yes or No) if yes, where and when uncertain    Holter Monitor: (Yes or No) If yes, where and when: No        Hospital History:    Have you been hospitalized or had a visit to the Emergency Room for any Cardiac reason in the last 6 months?     If yes, where/ when/ why:   Hospitalized 2/4/25 and discharged 2/8/25 at [facility] with COPD and Hypertension and hospitalist referred her to follow up with [facility]anks Initials: avinash

## 2025-02-13 NOTE — TELEPHONE ENCOUNTER
I already responded to this in the patient's other chart.  She has 2 charts.  I saw her in the hospital.  She just needs an appt with RG or KV for hospital follow up.  [facility]

## 2025-02-13 NOTE — TELEPHONE ENCOUNTER
Meghan, please schedule OV with Liat or Alix foss. Thanks.  (use this chart -- both charts are marked for merge)

## 2025-02-13 NOTE — TELEPHONE ENCOUNTER
Spoke w/Monica and she will have to check with her daughter and get back with us on that appointment.  Please schedule for asap with either Liat or Alix.

## 2025-02-17 DIAGNOSIS — E11.8 TYPE 2 DIABETES MELLITUS WITH COMPLICATION, WITHOUT LONG-TERM CURRENT USE OF INSULIN (HCC): ICD-10-CM

## 2025-02-17 DIAGNOSIS — D50.9 CHRONIC IRON DEFICIENCY ANEMIA: ICD-10-CM

## 2025-02-17 LAB
ALBUMIN SERPL-MCNC: 4.6 G/DL (ref 3.4–5)
ANION GAP SERPL CALCULATED.3IONS-SCNC: 16 MMOL/L (ref 3–16)
BUN SERPL-MCNC: 14 MG/DL (ref 7–20)
CALCIUM SERPL-MCNC: 10.9 MG/DL (ref 8.3–10.6)
CHLORIDE SERPL-SCNC: 103 MMOL/L (ref 99–110)
CO2 SERPL-SCNC: 24 MMOL/L (ref 21–32)
CREAT SERPL-MCNC: 1 MG/DL (ref 0.6–1.2)
GFR SERPLBLD CREATININE-BSD FMLA CKD-EPI: 57 ML/MIN/{1.73_M2}
GLUCOSE SERPL-MCNC: 112 MG/DL (ref 70–99)
IRON SATN MFR SERPL: 19 % (ref 15–50)
IRON SERPL-MCNC: 73 UG/DL (ref 37–145)
PHOSPHATE SERPL-MCNC: 3.4 MG/DL (ref 2.5–4.9)
POTASSIUM SERPL-SCNC: 4.8 MMOL/L (ref 3.5–5.1)
SODIUM SERPL-SCNC: 143 MMOL/L (ref 136–145)
TIBC SERPL-MCNC: 389 UG/DL (ref 260–445)

## 2025-02-21 DIAGNOSIS — E83.52 HYPERCALCEMIA: Primary | ICD-10-CM

## 2025-02-21 NOTE — RESULT ENCOUNTER NOTE
Your iron levels are normal.  The calcium level is mildly elevated.  It is slightly elevated at 10.9 with normal being 8.3-10.6.  Go to the lab to have some additional blood and urine test to further evaluate this.  There is a slight decrease in your kidney function with a GFR decreased from 65-57.  This GFR is still very good because people on dialysis are 15 or less.  This is probably due to the contrast dye received with your testing in the hospital.  The testing was very necessary and this should go back to your base line.  I will put an order in for a repeat kidney test.  Try to drink 8 glasses of water daily.  Avoid soft drinks.  Eat a lot of green vegetables.  Remember do not take any over-the-counter NSAIDs such as Motrin Aleve ibuprofen naproxen, because these are bad for your kidneys.  Normal sodium and potassium levels.  The blood sugar at the time the test was good at 112.

## 2025-02-21 NOTE — PROGRESS NOTES
extra glucose through the urine.  These medications can increase the risk of urinary tract infections and yeast infections. It is important to clean genital area well after urinating to prevent infections.       ** Most of these medications are metabolized in the kidneys and require routine blood tests to monitor kidney function and potassium levels**    Medications started or adjusted today: start spironolactone 1/2 pill once a day, stop potassium       Tests ordered today:   Labs due 2 weeks to check kidney function, electrolytes and your BNP (fluid level)      Follow Up:   Schedule an appointment with Trent for 4-6 weeks           I appreciate the opportunity of cooperating in the care of this individual.    TRENT MARQUEZ, APRN - CNP, 2/21/2025, 3:47 PM

## 2025-02-23 DIAGNOSIS — E55.9 VITAMIN D DEFICIENCY: ICD-10-CM

## 2025-02-24 RX ORDER — MULTIVIT-MIN/IRON/FOLIC ACID/K 18-600-40
2 CAPSULE ORAL DAILY
Qty: 180 TABLET | Refills: 3 | Status: SHIPPED | OUTPATIENT
Start: 2025-02-24

## 2025-02-24 NOTE — TELEPHONE ENCOUNTER
Medication:   Requested Prescriptions     Pending Prescriptions Disp Refills    D3-1000 25 MCG (1000 UT) TABS tablet [Pharmacy Med Name: VITAMIN D3 (CHOLECAL) 1000 IU TAB] 180 tablet 3     Sig: TAKE 2 TABLETS BY MOUTH DAILY        Last Filled:      Patient Phone Number: [phone] (home)     Last appt: 2/11/2025   Next appt: Visit date not found    Last OARRS:        No data to display

## 2025-02-25 ENCOUNTER — OFFICE VISIT (OUTPATIENT)
Dept: CARDIOLOGY CLINIC | Age: 80
End: 2025-02-25

## 2025-02-25 VITALS
WEIGHT: 227 LBS | BODY MASS INDEX: 37.82 KG/M2 | DIASTOLIC BLOOD PRESSURE: 60 MMHG | OXYGEN SATURATION: 98 % | HEART RATE: 81 BPM | SYSTOLIC BLOOD PRESSURE: 130 MMHG | HEIGHT: 65 IN

## 2025-02-25 DIAGNOSIS — R06.09 DOE (DYSPNEA ON EXERTION): ICD-10-CM

## 2025-02-25 DIAGNOSIS — I50.31 ACUTE DIASTOLIC CONGESTIVE HEART FAILURE (HCC): Primary | ICD-10-CM

## 2025-02-25 DIAGNOSIS — E83.52 HYPERCALCEMIA: ICD-10-CM

## 2025-02-25 DIAGNOSIS — I10 HYPERTENSION, ESSENTIAL: Chronic | ICD-10-CM

## 2025-02-25 LAB
ALBUMIN SERPL-MCNC: 4.2 G/DL (ref 3.4–5)
ANION GAP SERPL CALCULATED.3IONS-SCNC: 11 MMOL/L (ref 3–16)
BUN SERPL-MCNC: 12 MG/DL (ref 7–20)
CALCIUM SERPL-MCNC: 10.2 MG/DL (ref 8.3–10.6)
CHLORIDE SERPL-SCNC: 107 MMOL/L (ref 99–110)
CO2 SERPL-SCNC: 23 MMOL/L (ref 21–32)
CREAT SERPL-MCNC: 1 MG/DL (ref 0.6–1.2)
CREAT UR-MCNC: 149 MG/DL (ref 28–259)
GFR SERPLBLD CREATININE-BSD FMLA CKD-EPI: 57 ML/MIN/{1.73_M2}
GLUCOSE SERPL-MCNC: 151 MG/DL (ref 70–99)
MICROALBUMIN UR DL<=1MG/L-MCNC: 1.44 MG/DL
MICROALBUMIN/CREAT UR: 9.7 MG/G (ref 0–30)
PHOSPHATE SERPL-MCNC: 2.9 MG/DL (ref 2.5–4.9)
POTASSIUM SERPL-SCNC: 4.8 MMOL/L (ref 3.5–5.1)
PTH-INTACT SERPL-MCNC: 67.6 PG/ML (ref 14–72)
SODIUM SERPL-SCNC: 141 MMOL/L (ref 136–145)

## 2025-02-25 RX ORDER — SPIRONOLACTONE 25 MG/1
12.5 TABLET ORAL DAILY
Qty: 30 TABLET | Refills: 3 | Status: SHIPPED | OUTPATIENT
Start: 2025-02-25

## 2025-02-25 NOTE — PATIENT INSTRUCTIONS
Visit Summary and Instructions:     Today we talked about:     Diastolic heart failure which means the heart muscle is stiff and cannot fill properly with blood. This can cause fluid build up in different areas of your body including your lungs, also known as congestive heart failure (CHF).   Congestive Heart Failure Symptoms  Some symptoms of heart failure you might be able to see, while others you may feel. It is important you pay attention to your body. Monitor for all symptoms.  Call [phone] when you notice something is wrong. By calling early, you may feel better sooner and stay well at home. If your symptoms are severe, the doctor might see you in the office or tell you to go to the hospital.   Trouble Breathing  Feeling short of breath when doing everyday activities or even at rest  Waking up feeling like you need to sit up to breathe  Trouble breathing when lying flat or feeling more comfortable sitting upright to sleep  Cough or Wheezing  Dry, hacking cough that is worse when you lie down  Swelling  Swelling in your feet, ankles or legs  Jewelry, clothing or shoes fitting tighter  Swelling in abdomen  Weight Gain  Rapid weight gain of 3 pounds or more in 1-7 days  Work with your doctor to find the ideal weight range for you to compare weight gain or loss    Tired Feeling  Feeling tired doing normal activities, like walking, bathing and shopping  Nausea, Bloating or Lack of Appetite  Feeling full or sick to your stomach after eating small meals or more so than usual  Abdominal bloating  Confusion or Impaired Thinking  Memory loss, a “foggy” feeling or confusion  This might be better observed by friends or family        Action Items for you:   Weigh yourself every day in the morning after urination, call Liat if your wt increases 2-3lb in one day or 5lb in one week -- [phone]  Limit sodium to 3000mg/day and fluids to 2L or 64oz/day.       Heart Failure Meds  Spironolactone/Eplerenone: Aldosterone

## 2025-02-26 LAB
ALBUMIN SERPL ELPH-MCNC: 3.2 G/DL (ref 3.1–4.9)
ALPHA1 GLOB SERPL ELPH-MCNC: 0.3 G/DL (ref 0.2–0.4)
ALPHA2 GLOB SERPL ELPH-MCNC: 1 G/DL (ref 0.4–1.1)
B-GLOBULIN SERPL ELPH-MCNC: 1.5 G/DL (ref 0.9–1.6)
GAMMA GLOB SERPL ELPH-MCNC: 0.7 G/DL (ref 0.6–1.8)
PROT PATTERN UR ELPH-IMP: NORMAL
PROT SERPL-MCNC: 6.6 G/DL (ref 6.4–8.2)
PROT UR-MCNC: 0.01 G/DL
PROT UR-MCNC: 14 MG/DL

## 2025-02-27 DIAGNOSIS — E11.8 TYPE 2 DIABETES MELLITUS WITH COMPLICATION, WITHOUT LONG-TERM CURRENT USE OF INSULIN (HCC): Primary | ICD-10-CM

## 2025-02-27 DIAGNOSIS — R80.9 MICROALBUMINURIA DUE TO TYPE 2 DIABETES MELLITUS (HCC): ICD-10-CM

## 2025-02-27 DIAGNOSIS — E11.29 MICROALBUMINURIA DUE TO TYPE 2 DIABETES MELLITUS (HCC): ICD-10-CM

## 2025-02-28 LAB — SPE/IFE INTERPRETATION: NORMAL

## 2025-02-28 NOTE — RESULT ENCOUNTER NOTE
Kidney function is stable with no further decrease.  There is protein in the urine which is a sign of damage to the kidneys.  Unfortunately you cannot tolerate the 2 medicines that help this which would be ACE inhibitor's like lisinopril or Farxiga that gave you urinary tract infections.  There is another  medication ,Kerendia ,that would help.  I am sending you to the kidney doctor to manage this.  Call the number below for an appointment.  Overall your kidney function is good. It is important  to prevent any further decrease.  Stay well-hydrated with water and avoid soft drinks.  Referral in my chart to see Dr. Wilson, kidney specialist.

## 2025-03-05 ENCOUNTER — OFFICE VISIT (OUTPATIENT)
Dept: PULMONOLOGY | Age: 80
End: 2025-03-05
Payer: MEDICARE

## 2025-03-05 VITALS
TEMPERATURE: 97.2 F | OXYGEN SATURATION: 92 % | BODY MASS INDEX: 37.49 KG/M2 | DIASTOLIC BLOOD PRESSURE: 70 MMHG | HEIGHT: 65 IN | WEIGHT: 225 LBS | SYSTOLIC BLOOD PRESSURE: 130 MMHG | RESPIRATION RATE: 18 BRPM | HEART RATE: 84 BPM

## 2025-03-05 DIAGNOSIS — J96.11 CHRONIC HYPOXEMIC RESPIRATORY FAILURE (HCC): Primary | ICD-10-CM

## 2025-03-05 DIAGNOSIS — I50.32 CHRONIC DIASTOLIC HEART FAILURE (HCC): ICD-10-CM

## 2025-03-05 DIAGNOSIS — J43.2 CENTRILOBULAR EMPHYSEMA (HCC): Chronic | ICD-10-CM

## 2025-03-05 PROCEDURE — 1159F MED LIST DOCD IN RCRD: CPT | Performed by: INTERNAL MEDICINE

## 2025-03-05 PROCEDURE — 1123F ACP DISCUSS/DSCN MKR DOCD: CPT | Performed by: INTERNAL MEDICINE

## 2025-03-05 PROCEDURE — 3075F SYST BP GE 130 - 139MM HG: CPT | Performed by: INTERNAL MEDICINE

## 2025-03-05 PROCEDURE — 99214 OFFICE O/P EST MOD 30 MIN: CPT | Performed by: INTERNAL MEDICINE

## 2025-03-05 PROCEDURE — G2211 COMPLEX E/M VISIT ADD ON: HCPCS | Performed by: INTERNAL MEDICINE

## 2025-03-05 PROCEDURE — 3078F DIAST BP <80 MM HG: CPT | Performed by: INTERNAL MEDICINE

## 2025-03-05 ASSESSMENT — ENCOUNTER SYMPTOMS
CHOKING: 0
WHEEZING: 0
SHORTNESS OF BREATH: 0
COUGH: 0
CHEST TIGHTNESS: 0

## 2025-03-05 NOTE — ASSESSMENT & PLAN NOTE
-Managed by cardiology, diuresis as tolerated.  Looks like mostly pulmonary edema when she was admitted

## 2025-03-05 NOTE — PROGRESS NOTES
[facility] PULMONARY, SLEEP, AND CRITICAL CARE    Monica Tracey (:  1945) is a 79 y.o. female,New patient, here for evaluation of the following chief complaint(s):  Follow-up (CE   ? Inhaler switch  breztri  to trelegy)      Assessment & Plan   ASSESSMENT/PLAN:  1. Chronic hypoxemic respiratory failure (HCC)  Assessment & Plan:  -Monica Tracey continues to use and benefit from supplemental oxygen.  She is certified to wear 3 L continuous.  2. Chronic diastolic heart failure (HCC)  Assessment & Plan:  -Managed by cardiology, diuresis as tolerated.  Looks like mostly pulmonary edema when she was admitted  3. Centrilobular emphysema (HCC)  Assessment & Plan:   Chronic, at goal (stable), continue current treatment plan              Return in about 3 months (around 2025).  Future Appointments   Date Time Provider Department Center   3/11/2025 11:30 AM Rob Wilson MD [facility] Nephrolo   2025  3:15 PM Liat Stephenson APRN - CNP FF Cardio MMA   2025  1:40 PM Andra Owusu APRN - CNP KW SLEEP MED [facility]   2025 11:00 AM [facility]            Subjective   SUBJECTIVE/OBJECTIVE:  08-fjgt-mnmp smoker quit 40 years ago follow-up emphysema    Recent admission to [place] for pulmonary edema and diastolic CHF diuresing well    PFTs with preserved spirometry but severely reduced diffusing capacity.    3 L oxygen requirement continues, was only with exertion prior to heart failure exacerbation.      Follows with Dr. Benitez for sleep apnea, wears BiPAP        Review of Systems   Constitutional: Negative.    Respiratory:  Negative for cough, choking, chest tightness, shortness of breath and wheezing.    Cardiovascular: Negative.    Psychiatric/Behavioral: Negative.            Objective   Physical Exam     Gen:  No acute distress.   Eyes: EOMI. Anicteric sclera. No conjunctival injection.   ENT: No discharge.External appearance of ears and nose normal.  Neck:

## 2025-03-05 NOTE — ASSESSMENT & PLAN NOTE
-Monicazackary Tracey continues to use and benefit from supplemental oxygen.  She is certified to wear 3 L continuous.

## 2025-03-08 ENCOUNTER — RESULTS FOLLOW-UP (OUTPATIENT)
Dept: PRIMARY CARE CLINIC | Age: 80
End: 2025-03-08

## 2025-03-08 NOTE — RESULT ENCOUNTER NOTE
There were no abnormal proteins in the urine.  No signs of multiple myeloma.  The Valsartan helps to reduce the protein from decreased renal function in the urine.

## 2025-03-10 DIAGNOSIS — I50.31 ACUTE DIASTOLIC CONGESTIVE HEART FAILURE (HCC): ICD-10-CM

## 2025-03-11 ENCOUNTER — RESULTS FOLLOW-UP (OUTPATIENT)
Dept: CARDIOLOGY CLINIC | Age: 80
End: 2025-03-11

## 2025-03-11 LAB
ANION GAP SERPL CALCULATED.3IONS-SCNC: 13 MMOL/L (ref 3–16)
BUN SERPL-MCNC: 17 MG/DL (ref 7–20)
CALCIUM SERPL-MCNC: 10.1 MG/DL (ref 8.3–10.6)
CHLORIDE SERPL-SCNC: 106 MMOL/L (ref 99–110)
CO2 SERPL-SCNC: 22 MMOL/L (ref 21–32)
CREAT SERPL-MCNC: 1.1 MG/DL (ref 0.6–1.2)
GFR SERPLBLD CREATININE-BSD FMLA CKD-EPI: 51 ML/MIN/{1.73_M2}
GLUCOSE SERPL-MCNC: 153 MG/DL (ref 70–99)
NT-PROBNP SERPL-MCNC: 246 PG/ML (ref 0–449)
POTASSIUM SERPL-SCNC: 4.3 MMOL/L (ref 3.5–5.1)
SODIUM SERPL-SCNC: 141 MMOL/L (ref 136–145)

## 2025-03-11 NOTE — TELEPHONE ENCOUNTER
----- Message from BRAD Severino CNP sent at 3/11/2025  8:42 AM EDT -----  Labs look great, no new orders. KJV

## 2025-03-17 ENCOUNTER — TELEPHONE (OUTPATIENT)
Dept: CARDIAC REHAB | Age: 80
End: 2025-03-17

## 2025-03-17 NOTE — TELEPHONE ENCOUNTER
Called pt. To confirm pulmonary rehab evaluation andrea Solomon. 09:00 am. Left message. Told about $15 copay/visit . 12 visit minimum.

## 2025-03-18 ENCOUNTER — HOSPITAL ENCOUNTER (OUTPATIENT)
Dept: CARDIAC REHAB | Age: 80
Setting detail: THERAPIES SERIES
Discharge: HOME | End: 2025-03-18
Payer: MEDICARE

## 2025-03-18 VITALS
HEIGHT: 65 IN | RESPIRATION RATE: 18 BRPM | OXYGEN SATURATION: 95 % | DIASTOLIC BLOOD PRESSURE: 62 MMHG | SYSTOLIC BLOOD PRESSURE: 118 MMHG | WEIGHT: 223.4 LBS | BODY MASS INDEX: 37.22 KG/M2 | HEART RATE: 71 BPM

## 2025-03-18 PROCEDURE — G0238 OTH RESP PROC, INDIV: HCPCS

## 2025-03-18 ASSESSMENT — PATIENT HEALTH QUESTIONNAIRE - PHQ9
10. IF YOU CHECKED OFF ANY PROBLEMS, HOW DIFFICULT HAVE THESE PROBLEMS MADE IT FOR YOU TO DO YOUR WORK, TAKE CARE OF THINGS AT HOME, OR GET ALONG WITH OTHER PEOPLE: NOT DIFFICULT AT ALL
SUM OF ALL RESPONSES TO PHQ QUESTIONS 1-9: 7
SUM OF ALL RESPONSES TO PHQ QUESTIONS 1-9: 7
6. FEELING BAD ABOUT YOURSELF - OR THAT YOU ARE A FAILURE OR HAVE LET YOURSELF OR YOUR FAMILY DOWN: NOT AT ALL
7. TROUBLE CONCENTRATING ON THINGS, SUCH AS READING THE NEWSPAPER OR WATCHING TELEVISION: MORE THAN HALF THE DAYS
3. TROUBLE FALLING OR STAYING ASLEEP: NOT AT ALL
1. LITTLE INTEREST OR PLEASURE IN DOING THINGS: MORE THAN HALF THE DAYS
9. THOUGHTS THAT YOU WOULD BE BETTER OFF DEAD, OR OF HURTING YOURSELF: NOT AT ALL
8. MOVING OR SPEAKING SO SLOWLY THAT OTHER PEOPLE COULD HAVE NOTICED. OR THE OPPOSITE, BEING SO FIGETY OR RESTLESS THAT YOU HAVE BEEN MOVING AROUND A LOT MORE THAN USUAL: NOT AT ALL
4. FEELING TIRED OR HAVING LITTLE ENERGY: SEVERAL DAYS
SUM OF ALL RESPONSES TO PHQ QUESTIONS 1-9: 7
2. FEELING DOWN, DEPRESSED OR HOPELESS: MORE THAN HALF THE DAYS
5. POOR APPETITE OR OVEREATING: NOT AT ALL
SUM OF ALL RESPONSES TO PHQ QUESTIONS 1-9: 7

## 2025-03-18 NOTE — CARDIO/PULMONARY
[facility] Cardiac Rehabilitation Initial Evaluation    Monica Tracey       1945     [id number]    Share medical information:  Yes - Abbi (daughter); [phone]    Pulmonary History    Other:  Centrilobular Emphysema    Physical Assessment     General Appearance   Height:  165.1 cm (5' 5\")  Weight:  101.3 kg (223 lb 6.4 oz) (223.4 lb)   BMI:  37.3  Skin color:  Skin is warm, dry and appropriate for ethnicity.    Cardiovascular Assessment  BP Sittin/62 (right arm)  Sittin/60 (left arm)  Standin/80 (right arm)  Heart rate:   71 Monitor   Heart sounds: Exceptions to WDL Regular S1, S2, No adventitious heart sounds    Respiratory Assessment  Resp rate: 18   SpO2:  95 % (on 3 lpm Cont.)  Quality/Effort:   Respirations are regular and easy.  Lungs are clear but diminished throughout.  Has increased work of breathing noted with activity.  Sleep Apnea:  Yes  CPAP  Yes  Oxygen  Yes     Sleeping Habits:  Pt. States not issues with sleep.    Cough:  No  Wheezing:  No  Chest discomfort:  No    Edema:  No      Orthopedic/Exercise Limitations:  No    Pain:   Do you have pain?:  yes - pt. States that she gets mild pain to knees at times.       Fall Risk Assessment  Outpatient population: Not applicable  History of falling with or without injury: No  Use of ambulatory aid: No  Difficulty walking/impaired gait: No  Numbness in feet: No  Vision changes: No  Dizziness: No  Shortness of breath: Yes  Current medications include but not limited to: ACE, ARB, Beta  Blocker  Other fall risk : No  Outpatient fall risk intervention strategies: Fall risk education provided    Abuse / Neglect  Physical/behavioral signs of abuse/neglect   No    Do you feel safe at home   Yes    Advanced Directives  Patient has Advanced Directives:  No  Patient given Advanced Directive pack:  Declined    Vaccinations  Influenza (annual):  Yes  Pneumonia:  Yes    Pt. Arrived to Cardiopulmonary rehab for the

## 2025-03-19 ENCOUNTER — TELEPHONE (OUTPATIENT)
Dept: PULMONOLOGY | Age: 80
End: 2025-03-19

## 2025-03-19 DIAGNOSIS — J43.2 CENTRILOBULAR EMPHYSEMA (HCC): Primary | ICD-10-CM

## 2025-03-19 NOTE — TELEPHONE ENCOUNTER
Pt called and said she likes the trelegy sample she was given and would like a script sent to [facility] on [address]

## 2025-03-20 RX ORDER — FLUTICASONE FUROATE, UMECLIDINIUM BROMIDE AND VILANTEROL TRIFENATATE 200; 62.5; 25 UG/1; UG/1; UG/1
1 POWDER RESPIRATORY (INHALATION) DAILY
Qty: 1 EACH | Refills: 5 | Status: SHIPPED | OUTPATIENT
Start: 2025-03-20

## 2025-03-24 ENCOUNTER — HOSPITAL ENCOUNTER (OUTPATIENT)
Dept: CARDIAC REHAB | Age: 80
Setting detail: THERAPIES SERIES
Discharge: HOME | End: 2025-03-24
Payer: MEDICARE

## 2025-03-24 PROCEDURE — G0239 OTH RESP PROC, GROUP: HCPCS

## 2025-03-26 ENCOUNTER — HOSPITAL ENCOUNTER (OUTPATIENT)
Dept: CARDIAC REHAB | Age: 80
Setting detail: THERAPIES SERIES
Discharge: HOME | End: 2025-03-26
Payer: MEDICARE

## 2025-03-26 PROCEDURE — G0239 OTH RESP PROC, GROUP: HCPCS

## 2025-03-28 ENCOUNTER — HOSPITAL ENCOUNTER (OUTPATIENT)
Dept: CARDIAC REHAB | Age: 80
Setting detail: THERAPIES SERIES
Discharge: HOME | End: 2025-03-28
Payer: MEDICARE

## 2025-03-28 PROCEDURE — G0239 OTH RESP PROC, GROUP: HCPCS

## 2025-03-31 ENCOUNTER — HOSPITAL ENCOUNTER (OUTPATIENT)
Dept: CARDIAC REHAB | Age: 80
Setting detail: THERAPIES SERIES
Discharge: HOME | End: 2025-03-31
Payer: MEDICARE

## 2025-03-31 PROCEDURE — G0239 OTH RESP PROC, GROUP: HCPCS

## 2025-03-31 NOTE — PROGRESS NOTES
[facility]   Congestive Heart Failure    Primary Care Doctor:  Tarsha Hernandez MD      Chief Complaint:  CHF    History of Present Illness:  Monica Tracey is a 79 y.o. female with PMH COPD, tobacco abuse, SCOTTIE, new dx HFpEF who presents today for chf f/u. At hosp f/u visit, solo started and kcl stopped       Today:  she is feeling well, is in MA and tolerating well, wt is stable but she has been taking solo 25 vs 12.5mg/day. She denies chest pain, palpitations, orthopnea, PND, exertional chest pressure/discomfort, fatigue, early saiety, edema, syncope.     Today's home wt:  220      Baseline Weight: 217-220  Wt Readings from Last 3 Encounters:   04/01/25 100.2 kg (221 lb)   03/18/25 101.3 kg (223 lb 6.4 oz)   03/11/25 100.5 kg (221 lb 9.6 oz)           EF: 65-70%  Cardiac Imaging:  Echo 2/6/25:    Left Ventricle: Normal left ventricular systolic function with a visually estimated EF of 65 - 70%. EF by 2D Simpsons Biplane is 56%. Left ventricle size is normal. Normal wall thickness. Septal bounce is present without annulus reversus. Normal wall motion. Abnormal diastolic function. Elevated left ventricular filling pressure.    Right Ventricle: Right ventricle is dilated. Low normal systolic function.    Mitral Valve: Annular calcification. Borderline mild stenosis noted. MV mean gradient is 4 mmHg at 78 bpm.    Tricuspid Valve: Mild regurgitation. Unable to assess RVSP due to inadequate or insignificant tricuspid regurgitation.    Pericardium: Trivial pericardial effusion present.    Image quality is adequate. Contrast used: Lumason         Activity: under baseline, has pulm rehab referral  Can you walk 1-2 blocks or do a moderate amount of house/yard work?Yes  Cardiac Rehab Referral: No     NYHA Class: II         Pt Education: The patient has received education on the following topics:   dietary sodium and fluid restriction Yes   heart failure medications Yes  the importance of physical

## 2025-04-01 ENCOUNTER — OFFICE VISIT (OUTPATIENT)
Dept: CARDIOLOGY CLINIC | Age: 80
End: 2025-04-01
Payer: MEDICARE

## 2025-04-01 VITALS
WEIGHT: 221 LBS | SYSTOLIC BLOOD PRESSURE: 136 MMHG | BODY MASS INDEX: 36.82 KG/M2 | DIASTOLIC BLOOD PRESSURE: 60 MMHG | HEIGHT: 65 IN | HEART RATE: 68 BPM | OXYGEN SATURATION: 97 %

## 2025-04-01 DIAGNOSIS — I50.32 CHRONIC DIASTOLIC HEART FAILURE (HCC): Primary | ICD-10-CM

## 2025-04-01 DIAGNOSIS — I10 HYPERTENSION, ESSENTIAL: Chronic | ICD-10-CM

## 2025-04-01 DIAGNOSIS — R06.09 DOE (DYSPNEA ON EXERTION): ICD-10-CM

## 2025-04-01 PROCEDURE — 3078F DIAST BP <80 MM HG: CPT | Performed by: NURSE PRACTITIONER

## 2025-04-01 PROCEDURE — 99214 OFFICE O/P EST MOD 30 MIN: CPT | Performed by: NURSE PRACTITIONER

## 2025-04-01 PROCEDURE — 3075F SYST BP GE 130 - 139MM HG: CPT | Performed by: NURSE PRACTITIONER

## 2025-04-01 PROCEDURE — 1159F MED LIST DOCD IN RCRD: CPT | Performed by: NURSE PRACTITIONER

## 2025-04-01 PROCEDURE — 1123F ACP DISCUSS/DSCN MKR DOCD: CPT | Performed by: NURSE PRACTITIONER

## 2025-04-01 PROCEDURE — 1160F RVW MEDS BY RX/DR IN RCRD: CPT | Performed by: NURSE PRACTITIONER

## 2025-04-01 RX ORDER — SPIRONOLACTONE 25 MG/1
25 TABLET ORAL DAILY
Qty: 90 TABLET | Refills: 1 | Status: SHIPPED | OUTPATIENT
Start: 2025-04-01

## 2025-04-01 ASSESSMENT — ENCOUNTER SYMPTOMS
GASTROINTESTINAL NEGATIVE: 1
RESPIRATORY NEGATIVE: 1

## 2025-04-01 NOTE — PATIENT INSTRUCTIONS
Visit Summary and Instructions:     Medications started or adjusted today: no changes in meds      Tests ordered today:   Labs due before office visit to check kidney function, electrolytes and your BNP (fluid level)      Follow Up:   Schedule an appointment with Liat for 3-4 months

## 2025-04-02 ENCOUNTER — HOSPITAL ENCOUNTER (OUTPATIENT)
Dept: CARDIAC REHAB | Age: 80
Setting detail: THERAPIES SERIES
Discharge: HOME | End: 2025-04-02
Payer: MEDICARE

## 2025-04-02 PROCEDURE — G0239 OTH RESP PROC, GROUP: HCPCS

## 2025-04-04 ENCOUNTER — HOSPITAL ENCOUNTER (OUTPATIENT)
Dept: CARDIAC REHAB | Age: 80
Setting detail: THERAPIES SERIES
Discharge: HOME | End: 2025-04-04
Payer: MEDICARE

## 2025-04-04 PROCEDURE — G0239 OTH RESP PROC, GROUP: HCPCS

## 2025-04-07 ENCOUNTER — APPOINTMENT (OUTPATIENT)
Dept: CARDIAC REHAB | Age: 80
End: 2025-04-07
Payer: MEDICARE

## 2025-04-09 ENCOUNTER — APPOINTMENT (OUTPATIENT)
Dept: CARDIAC REHAB | Age: 80
End: 2025-04-09
Payer: MEDICARE

## 2025-04-11 ENCOUNTER — APPOINTMENT (OUTPATIENT)
Dept: CARDIAC REHAB | Age: 80
End: 2025-04-11
Payer: MEDICARE

## 2025-04-14 ENCOUNTER — HOSPITAL ENCOUNTER (OUTPATIENT)
Dept: CARDIAC REHAB | Age: 80
Setting detail: THERAPIES SERIES
Discharge: HOME | End: 2025-04-14

## 2025-04-14 ENCOUNTER — APPOINTMENT (OUTPATIENT)
Dept: CARDIAC REHAB | Age: 80
End: 2025-04-14
Payer: MEDICARE

## 2025-04-14 PROCEDURE — G0239 OTH RESP PROC, GROUP: HCPCS

## 2025-04-16 ENCOUNTER — HOSPITAL ENCOUNTER (OUTPATIENT)
Dept: CARDIAC REHAB | Age: 80
Setting detail: THERAPIES SERIES
Discharge: HOME | End: 2025-04-16
Payer: MEDICARE

## 2025-04-16 ENCOUNTER — APPOINTMENT (OUTPATIENT)
Dept: CARDIAC REHAB | Age: 80
End: 2025-04-16
Payer: MEDICARE

## 2025-04-16 PROCEDURE — G0239 OTH RESP PROC, GROUP: HCPCS

## 2025-04-18 ENCOUNTER — HOSPITAL ENCOUNTER (OUTPATIENT)
Dept: CARDIAC REHAB | Age: 80
Setting detail: THERAPIES SERIES
Discharge: HOME | End: 2025-04-18
Payer: MEDICARE

## 2025-04-18 ENCOUNTER — APPOINTMENT (OUTPATIENT)
Dept: CARDIAC REHAB | Age: 80
End: 2025-04-18
Payer: MEDICARE

## 2025-04-18 PROCEDURE — G0239 OTH RESP PROC, GROUP: HCPCS

## 2025-04-21 ENCOUNTER — HOSPITAL ENCOUNTER (OUTPATIENT)
Dept: CARDIAC REHAB | Age: 80
Setting detail: THERAPIES SERIES
Discharge: HOME | End: 2025-04-21
Payer: MEDICARE

## 2025-04-21 PROCEDURE — G0239 OTH RESP PROC, GROUP: HCPCS

## 2025-04-23 ENCOUNTER — HOSPITAL ENCOUNTER (OUTPATIENT)
Dept: CARDIAC REHAB | Age: 80
Setting detail: THERAPIES SERIES
Discharge: HOME | End: 2025-04-23
Payer: MEDICARE

## 2025-04-23 PROCEDURE — G0239 OTH RESP PROC, GROUP: HCPCS

## 2025-04-25 ENCOUNTER — HOSPITAL ENCOUNTER (OUTPATIENT)
Dept: CARDIAC REHAB | Age: 80
Setting detail: THERAPIES SERIES
End: 2025-04-25
Payer: MEDICARE

## 2025-04-28 ENCOUNTER — APPOINTMENT (OUTPATIENT)
Dept: CARDIAC REHAB | Age: 80
End: 2025-04-28
Payer: MEDICARE

## 2025-04-30 ENCOUNTER — APPOINTMENT (OUTPATIENT)
Dept: CARDIAC REHAB | Age: 80
End: 2025-04-30
Payer: MEDICARE

## 2025-05-21 ENCOUNTER — HOSPITAL ENCOUNTER (OUTPATIENT)
Dept: WOMENS IMAGING | Age: 80
Discharge: HOME | End: 2025-05-21
Payer: MEDICARE

## 2025-05-21 VITALS — BODY MASS INDEX: 36.32 KG/M2 | WEIGHT: 218 LBS | HEIGHT: 65 IN

## 2025-05-21 DIAGNOSIS — Z12.31 ENCOUNTER FOR SCREENING MAMMOGRAM FOR BREAST CANCER: ICD-10-CM

## 2025-05-21 PROCEDURE — 77063 BREAST TOMOSYNTHESIS BI: CPT

## 2025-05-23 ENCOUNTER — RESULTS FOLLOW-UP (OUTPATIENT)
Dept: PRIMARY CARE CLINIC | Age: 80
End: 2025-05-23

## 2025-06-10 ENCOUNTER — OFFICE VISIT (OUTPATIENT)
Dept: PRIMARY CARE CLINIC | Age: 80
End: 2025-06-10
Payer: MEDICARE

## 2025-06-10 VITALS
BODY MASS INDEX: 36.94 KG/M2 | DIASTOLIC BLOOD PRESSURE: 68 MMHG | OXYGEN SATURATION: 95 % | SYSTOLIC BLOOD PRESSURE: 127 MMHG | RESPIRATION RATE: 18 BRPM | HEART RATE: 76 BPM | WEIGHT: 222 LBS

## 2025-06-10 DIAGNOSIS — I10 HYPERTENSION, ESSENTIAL: Chronic | ICD-10-CM

## 2025-06-10 DIAGNOSIS — E55.9 VITAMIN D DEFICIENCY: ICD-10-CM

## 2025-06-10 DIAGNOSIS — N18.31 STAGE 3A CHRONIC KIDNEY DISEASE (HCC): ICD-10-CM

## 2025-06-10 DIAGNOSIS — J43.2 CENTRILOBULAR EMPHYSEMA (HCC): ICD-10-CM

## 2025-06-10 DIAGNOSIS — E78.00 PURE HYPERCHOLESTEROLEMIA: ICD-10-CM

## 2025-06-10 DIAGNOSIS — E11.8 TYPE 2 DIABETES MELLITUS WITH COMPLICATION, WITHOUT LONG-TERM CURRENT USE OF INSULIN (HCC): Primary | ICD-10-CM

## 2025-06-10 DIAGNOSIS — E03.9 ACQUIRED HYPOTHYROIDISM: ICD-10-CM

## 2025-06-10 DIAGNOSIS — E78.2 MIXED HYPERLIPIDEMIA: ICD-10-CM

## 2025-06-10 PROBLEM — N18.9 CKD (CHRONIC KIDNEY DISEASE): Status: ACTIVE | Noted: 2025-06-10

## 2025-06-10 LAB — HBA1C MFR BLD: 7.2 %

## 2025-06-10 PROCEDURE — 3078F DIAST BP <80 MM HG: CPT | Performed by: INTERNAL MEDICINE

## 2025-06-10 PROCEDURE — 1160F RVW MEDS BY RX/DR IN RCRD: CPT | Performed by: INTERNAL MEDICINE

## 2025-06-10 PROCEDURE — 1159F MED LIST DOCD IN RCRD: CPT | Performed by: INTERNAL MEDICINE

## 2025-06-10 PROCEDURE — 83036 HEMOGLOBIN GLYCOSYLATED A1C: CPT | Performed by: INTERNAL MEDICINE

## 2025-06-10 PROCEDURE — 99214 OFFICE O/P EST MOD 30 MIN: CPT | Performed by: INTERNAL MEDICINE

## 2025-06-10 PROCEDURE — 1123F ACP DISCUSS/DSCN MKR DOCD: CPT | Performed by: INTERNAL MEDICINE

## 2025-06-10 PROCEDURE — 3051F HG A1C>EQUAL 7.0%<8.0%: CPT | Performed by: INTERNAL MEDICINE

## 2025-06-10 PROCEDURE — 3074F SYST BP LT 130 MM HG: CPT | Performed by: INTERNAL MEDICINE

## 2025-06-10 RX ORDER — BLOOD SUGAR DIAGNOSTIC
STRIP MISCELLANEOUS
Qty: 100 STRIP | Refills: 12 | Status: SHIPPED | OUTPATIENT
Start: 2025-06-10

## 2025-06-10 RX ORDER — SIMVASTATIN 40 MG
40 TABLET ORAL NIGHTLY
Qty: 90 TABLET | Refills: 3 | Status: SHIPPED | OUTPATIENT
Start: 2025-06-10

## 2025-06-10 RX ORDER — MULTIVIT-MIN/IRON/FOLIC ACID/K 18-600-40
2 CAPSULE ORAL DAILY
Qty: 180 TABLET | Refills: 3 | Status: SHIPPED | OUTPATIENT
Start: 2025-06-10

## 2025-06-10 RX ORDER — VALSARTAN 40 MG/1
40 TABLET ORAL DAILY
Qty: 30 TABLET | Refills: 3 | Status: SHIPPED | OUTPATIENT
Start: 2025-06-10

## 2025-06-10 RX ORDER — FLUTICASONE FUROATE, UMECLIDINIUM BROMIDE AND VILANTEROL TRIFENATATE 200; 62.5; 25 UG/1; UG/1; UG/1
1 POWDER RESPIRATORY (INHALATION) DAILY
Qty: 1 EACH | Refills: 5 | Status: SHIPPED | OUTPATIENT
Start: 2025-06-10

## 2025-06-10 RX ORDER — LEVOTHYROXINE SODIUM 25 UG/1
25 TABLET ORAL DAILY
Qty: 90 TABLET | Refills: 3 | Status: SHIPPED | OUTPATIENT
Start: 2025-06-10

## 2025-06-10 RX ORDER — VALSARTAN 40 MG/1
40 TABLET ORAL DAILY
Qty: 90 TABLET | Refills: 1 | Status: SHIPPED | OUTPATIENT
Start: 2025-06-10

## 2025-06-10 NOTE — PROGRESS NOTES
Monica Tracey (:  1945) is a 80 y.o. female,Established patient, here for evaluation of the following chief complaint(s):  Check-Up  A1c 7.2   O2 95%    Assessment & Plan   ASSESSMENT/PLAN:  1. Type 2 diabetes mellitus with complication, without long-term current use of insulin (HCC) controlled on glimepiride 1 mg daily, diet, continue.  Patient is also on valsartan 40 mg daily and simvastatin 40 mg daily.  Lab Results   Component Value Date    LABA1C 7.2 06/10/2025    LABA1C 6.7 2025    LABA1C 6.3 2025     Lab Results   Component Value Date    CREATININE 1.0 2025       -     POCT glycosylated hemoglobin (Hb A1C)  -     blood glucose test strips (ONETOUCH ULTRA) strip; USE TO TEST TWO TIMES A DAY, Disp-100 strip, R-12Normal  -     valsartan (DIOVAN) 40 MG tablet; Take 1 tablet by mouth daily, Disp-30 tablet, R-3Normal  2. Centrilobular emphysema (HCC), O2 dependent symptoms stable on Trelegy.  Continue.  Followed by specialist continue.  -     fluticasone-umeclidin-vilant (TRELEGY ELLIPTA) 200-62.5-25 MCG/ACT AEPB inhaler; Inhale 1 puff into the lungs daily, Disp-1 each, R-5Normal  3. Acquired hypothyroidism clinically euthyroid on levothyroxine 25 mcg daily, continue.  Up-to-date with laboratory monitoring.  Lab Results   Component Value Date    TSH 0.65 2025    TSHFT4 2.27 10/22/2024      -     levothyroxine (SYNTHROID) 25 MCG tablet; Take 1 tablet by mouth daily, Disp-90 tablet, R-3Normal  4. Pure hypercholesterolemia  Stable with simvastatin 40 mg daily due for monitoring in 2025.  Lab Results   Component Value Date    CHOL 169 10/22/2024    CHOL 180 2024    CHOL 188 10/09/2023     Lab Results   Component Value Date    TRIG 92 10/22/2024    TRIG 126 2024    TRIG 76 10/09/2023     Lab Results   Component Value Date    HDL 66 (H) 10/22/2024    HDL 67 (H) 2024    HDL 81 (H) 10/09/2023     Lab Results   Component Value Date    LDL 85 10/22/2024    LDL 88

## 2025-06-16 ENCOUNTER — OFFICE VISIT (OUTPATIENT)
Dept: PULMONOLOGY | Age: 80
End: 2025-06-16
Payer: MEDICARE

## 2025-06-16 VITALS
BODY MASS INDEX: 37.02 KG/M2 | HEIGHT: 65 IN | HEART RATE: 76 BPM | DIASTOLIC BLOOD PRESSURE: 80 MMHG | OXYGEN SATURATION: 93 % | SYSTOLIC BLOOD PRESSURE: 130 MMHG | WEIGHT: 222.2 LBS | RESPIRATION RATE: 18 BRPM | TEMPERATURE: 97.2 F

## 2025-06-16 DIAGNOSIS — J43.2 CENTRILOBULAR EMPHYSEMA (HCC): Chronic | ICD-10-CM

## 2025-06-16 DIAGNOSIS — J96.11 CHRONIC HYPOXEMIC RESPIRATORY FAILURE (HCC): Primary | ICD-10-CM

## 2025-06-16 PROCEDURE — 3075F SYST BP GE 130 - 139MM HG: CPT | Performed by: INTERNAL MEDICINE

## 2025-06-16 PROCEDURE — 1159F MED LIST DOCD IN RCRD: CPT | Performed by: INTERNAL MEDICINE

## 2025-06-16 PROCEDURE — 1123F ACP DISCUSS/DSCN MKR DOCD: CPT | Performed by: INTERNAL MEDICINE

## 2025-06-16 PROCEDURE — 3079F DIAST BP 80-89 MM HG: CPT | Performed by: INTERNAL MEDICINE

## 2025-06-16 PROCEDURE — 99213 OFFICE O/P EST LOW 20 MIN: CPT | Performed by: INTERNAL MEDICINE

## 2025-06-16 PROCEDURE — G2211 COMPLEX E/M VISIT ADD ON: HCPCS | Performed by: INTERNAL MEDICINE

## 2025-06-16 ASSESSMENT — ENCOUNTER SYMPTOMS
CONSTIPATION: 0
COUGH: 0
SORE THROAT: 0
VOMITING: 0
TROUBLE SWALLOWING: 0
VISUAL CHANGE: 0
RHINORRHEA: 0
DIARRHEA: 0
SHORTNESS OF BREATH: 0
CHEST TIGHTNESS: 0
ALLERGIC/IMMUNOLOGIC NEGATIVE: 1
SHORTNESS OF BREATH: 1
WHEEZING: 0
CHEST TIGHTNESS: 0
WHEEZING: 0
BLURRED VISION: 0
EYES NEGATIVE: 1
NAUSEA: 0
CHOKING: 0
COUGH: 0

## 2025-06-16 NOTE — ASSESSMENT & PLAN NOTE
Chronic, at goal (stable), continue current treatment plan, medication adherence emphasized, and lifestyle modifications recommended  - Continue Trelegy, albuterol as needed  - Former smoker

## 2025-06-16 NOTE — PROGRESS NOTES
[facility] PULMONARY, SLEEP, AND CRITICAL CARE    Monica Tracey (:  1945) is a 80 y.o. female,New patient, here for evaluation of the following chief complaint(s):  Follow-up      Assessment & Plan   ASSESSMENT/PLAN:  1. Chronic hypoxemic respiratory failure (HCC)  Assessment & Plan:  -Monica Tracey continues to use and benefit from supplemental oxygen.  3 L oxygen with exertion  2. Centrilobular emphysema (HCC)  Assessment & Plan:   Chronic, at goal (stable), continue current treatment plan, medication adherence emphasized, and lifestyle modifications recommended  - Continue Trelegy, albuterol as needed  - Former smoker                Return in about 6 months (around 2025).  Future Appointments   Date Time Provider Department Center   2025  2:45 PM Liat Stephenson APRN - CNP FF Cardio [facility]   2025 10:45 AM Leander Sharma MD AFL NEPH [facility] Nephrolo   10/8/2025  1:20 PM Andra Owusu APRN - CNP KW SLEEP MED [facility]   2025 11:00 AM Jeff Cook MD  PULM [facility]            Subjective   SUBJECTIVE/OBJECTIVE:  50-gutc-qyyn smoker quit 40 years ago follow-up emphysema    PFTs with preserved spirometry but severely reduced diffusing capacity.    3 L oxygen requirement continues with exertion    Follows with Dr. Benitez for sleep apnea, wears BiPAP        Review of Systems   Constitutional: Negative.    Respiratory:  Negative for cough, choking, chest tightness, shortness of breath and wheezing.    Cardiovascular: Negative.    Psychiatric/Behavioral: Negative.            Objective   Physical Exam     Gen:  No acute distress.   Eyes: EOMI. Anicteric sclera. No conjunctival injection.   ENT: No discharge.External appearance of ears and nose normal.  Neck: Trachea midline. No mass   Resp:  No crackles. No wheezes. No rhonchi. No dullness on percussion.  CV: Regular rate. Regular rhythm. No murmur or rub. No edema.   Neuro:  no focal neurologic deficit.  Moves all extremities  Psych:

## 2025-07-16 ENCOUNTER — TRANSCRIBE ORDERS (OUTPATIENT)
Dept: ADMINISTRATIVE | Age: 80
End: 2025-07-16

## 2025-07-16 DIAGNOSIS — D44.4 NEOPLASM OF UNCERTAIN BEHAVIOR OF PITUITARY GLAND AND CRANIOPHARYNGEAL DUCT (HCC): ICD-10-CM

## 2025-07-16 DIAGNOSIS — D32.9 MENINGIOMA (HCC): Primary | ICD-10-CM

## 2025-07-16 DIAGNOSIS — D44.3 NEOPLASM OF UNCERTAIN BEHAVIOR OF PITUITARY GLAND AND CRANIOPHARYNGEAL DUCT (HCC): ICD-10-CM

## 2025-07-21 ENCOUNTER — TELEPHONE (OUTPATIENT)
Dept: PRIMARY CARE CLINIC | Age: 80
End: 2025-07-21

## 2025-08-01 ENCOUNTER — CARE COORDINATION (OUTPATIENT)
Dept: CARE COORDINATION | Age: 80
End: 2025-08-01

## 2025-08-01 NOTE — CARE COORDINATION
Ambulatory Care Coordination Note     2025 1:08 PM     Patient Current Location:  Home: [address] [place] [id number]     This patient was received as a referral from [facility] [address] .    ACM contacted the patient by telephone. Verified name and  with patient as identifiers. Provided introduction to self, and explanation of the ACM role.   Patient declined care management services at this time.          ACM: Qiana Osborne RN    Care Summary Note: ACM outreach for enrollment to CC pt declined need for services at this time.   PCP/Specialist follow up:   Future Appointments         Provider Specialty Dept Phone    2025 2:45 PM Liat Stephenson APRN - CNP Cardiology [phone]    2025 6:15 PM (Arrive by 5:30 PM) [facility] 1 Radiology [phone]    2025 10:45 AM Leander Sharma MD Nephrology [phone]    10/8/2025 1:20 PM Andra Owusu APRN - CNP Sleep Medicine [phone]    2025 11:00 AM Jeff Cook MD Pulmonology [phone]

## 2025-08-04 ENCOUNTER — TELEPHONE (OUTPATIENT)
Dept: PRIMARY CARE CLINIC | Age: 80
End: 2025-08-04

## 2025-08-04 DIAGNOSIS — E11.8 TYPE 2 DIABETES MELLITUS WITH COMPLICATION, WITHOUT LONG-TERM CURRENT USE OF INSULIN (HCC): Primary | ICD-10-CM

## 2025-08-04 RX ORDER — GLIMEPIRIDE 1 MG/1
1 TABLET ORAL
Qty: 90 TABLET | Refills: 1 | Status: CANCELLED | OUTPATIENT
Start: 2025-08-04

## 2025-08-04 RX ORDER — GLIMEPIRIDE 1 MG/1
1 TABLET ORAL
Qty: 90 TABLET | Refills: 1 | Status: SHIPPED | OUTPATIENT
Start: 2025-08-04

## 2025-08-04 ASSESSMENT — ENCOUNTER SYMPTOMS
GASTROINTESTINAL NEGATIVE: 1
RESPIRATORY NEGATIVE: 1

## 2025-08-05 ENCOUNTER — OFFICE VISIT (OUTPATIENT)
Dept: CARDIOLOGY CLINIC | Age: 80
End: 2025-08-05
Payer: MEDICARE

## 2025-08-05 VITALS
OXYGEN SATURATION: 98 % | BODY MASS INDEX: 37.15 KG/M2 | HEART RATE: 70 BPM | SYSTOLIC BLOOD PRESSURE: 110 MMHG | WEIGHT: 223 LBS | DIASTOLIC BLOOD PRESSURE: 60 MMHG | HEIGHT: 65 IN

## 2025-08-05 DIAGNOSIS — R06.09 DOE (DYSPNEA ON EXERTION): ICD-10-CM

## 2025-08-05 DIAGNOSIS — I50.32 CHRONIC DIASTOLIC HEART FAILURE (HCC): Primary | ICD-10-CM

## 2025-08-05 DIAGNOSIS — I10 HYPERTENSION, ESSENTIAL: Chronic | ICD-10-CM

## 2025-08-05 PROCEDURE — 3074F SYST BP LT 130 MM HG: CPT | Performed by: NURSE PRACTITIONER

## 2025-08-05 PROCEDURE — 1160F RVW MEDS BY RX/DR IN RCRD: CPT | Performed by: NURSE PRACTITIONER

## 2025-08-05 PROCEDURE — 1159F MED LIST DOCD IN RCRD: CPT | Performed by: NURSE PRACTITIONER

## 2025-08-05 PROCEDURE — 1123F ACP DISCUSS/DSCN MKR DOCD: CPT | Performed by: NURSE PRACTITIONER

## 2025-08-05 PROCEDURE — 99214 OFFICE O/P EST MOD 30 MIN: CPT | Performed by: NURSE PRACTITIONER

## 2025-08-05 PROCEDURE — 3078F DIAST BP <80 MM HG: CPT | Performed by: NURSE PRACTITIONER

## 2025-08-11 DIAGNOSIS — E11.8 TYPE 2 DIABETES MELLITUS WITH COMPLICATION, WITHOUT LONG-TERM CURRENT USE OF INSULIN (HCC): Primary | ICD-10-CM

## 2025-08-12 ENCOUNTER — OFFICE VISIT (OUTPATIENT)
Dept: PRIMARY CARE CLINIC | Age: 80
End: 2025-08-12
Payer: MEDICARE

## 2025-08-12 VITALS
TEMPERATURE: 97.5 F | HEART RATE: 72 BPM | BODY MASS INDEX: 37.49 KG/M2 | OXYGEN SATURATION: 97 % | DIASTOLIC BLOOD PRESSURE: 58 MMHG | WEIGHT: 225 LBS | HEIGHT: 65 IN | SYSTOLIC BLOOD PRESSURE: 130 MMHG | RESPIRATION RATE: 16 BRPM

## 2025-08-12 DIAGNOSIS — E11.8 TYPE 2 DIABETES MELLITUS WITH COMPLICATION, WITHOUT LONG-TERM CURRENT USE OF INSULIN (HCC): ICD-10-CM

## 2025-08-12 DIAGNOSIS — R35.0 URINARY FREQUENCY: ICD-10-CM

## 2025-08-12 DIAGNOSIS — E78.00 PURE HYPERCHOLESTEROLEMIA: ICD-10-CM

## 2025-08-12 DIAGNOSIS — E11.8 TYPE 2 DIABETES MELLITUS WITH COMPLICATION, WITHOUT LONG-TERM CURRENT USE OF INSULIN (HCC): Primary | ICD-10-CM

## 2025-08-12 LAB
BACTERIA URNS QL MICRO: ABNORMAL /HPF
BASOPHILS # BLD: 0.1 K/UL (ref 0–0.2)
BASOPHILS NFR BLD: 1.7 %
BILIRUB UR QL STRIP.AUTO: NEGATIVE
CLARITY UR: CLEAR
COLOR UR: YELLOW
DEPRECATED RDW RBC AUTO: 15 % (ref 12.4–15.4)
EOSINOPHIL # BLD: 0.1 K/UL (ref 0–0.6)
EOSINOPHIL NFR BLD: 1.1 %
EPI CELLS #/AREA URNS AUTO: 2 /HPF (ref 0–5)
GLUCOSE UR STRIP.AUTO-MCNC: NEGATIVE MG/DL
HCT VFR BLD AUTO: 40.5 % (ref 36–48)
HGB BLD-MCNC: 13.3 G/DL (ref 12–16)
HGB UR QL STRIP.AUTO: NEGATIVE
HYALINE CASTS #/AREA URNS AUTO: 0 /LPF (ref 0–8)
KETONES UR STRIP.AUTO-MCNC: NEGATIVE MG/DL
LEUKOCYTE ESTERASE UR QL STRIP.AUTO: ABNORMAL
LYMPHOCYTES # BLD: 1.5 K/UL (ref 1–5.1)
LYMPHOCYTES NFR BLD: 16.4 %
MCH RBC QN AUTO: 30.5 PG (ref 26–34)
MCHC RBC AUTO-ENTMCNC: 32.9 G/DL (ref 31–36)
MCV RBC AUTO: 92.8 FL (ref 80–100)
MONOCYTES # BLD: 0.6 K/UL (ref 0–1.3)
MONOCYTES NFR BLD: 6.7 %
NEUTROPHILS # BLD: 6.6 K/UL (ref 1.7–7.7)
NEUTROPHILS NFR BLD: 74.1 %
NITRITE UR QL STRIP.AUTO: POSITIVE
PH UR STRIP.AUTO: 6 [PH] (ref 5–8)
PLATELET # BLD AUTO: 230 K/UL (ref 135–450)
PMV BLD AUTO: 9.8 FL (ref 5–10.5)
PROT UR STRIP.AUTO-MCNC: NEGATIVE MG/DL
RBC # BLD AUTO: 4.36 M/UL (ref 4–5.2)
RBC CLUMPS #/AREA URNS AUTO: 1 /HPF (ref 0–4)
SP GR UR STRIP.AUTO: 1.01 (ref 1–1.03)
UA COMPLETE W REFLEX CULTURE PNL UR: YES
UA DIPSTICK W REFLEX MICRO PNL UR: YES
URN SPEC COLLECT METH UR: ABNORMAL
UROBILINOGEN UR STRIP-ACNC: 0.2 E.U./DL
WBC # BLD AUTO: 8.9 K/UL (ref 4–11)
WBC #/AREA URNS AUTO: 78 /HPF (ref 0–5)

## 2025-08-12 PROCEDURE — 99214 OFFICE O/P EST MOD 30 MIN: CPT | Performed by: INTERNAL MEDICINE

## 2025-08-12 PROCEDURE — 3075F SYST BP GE 130 - 139MM HG: CPT | Performed by: INTERNAL MEDICINE

## 2025-08-12 PROCEDURE — 1160F RVW MEDS BY RX/DR IN RCRD: CPT | Performed by: INTERNAL MEDICINE

## 2025-08-12 PROCEDURE — 1123F ACP DISCUSS/DSCN MKR DOCD: CPT | Performed by: INTERNAL MEDICINE

## 2025-08-12 PROCEDURE — 3078F DIAST BP <80 MM HG: CPT | Performed by: INTERNAL MEDICINE

## 2025-08-12 PROCEDURE — 1159F MED LIST DOCD IN RCRD: CPT | Performed by: INTERNAL MEDICINE

## 2025-08-12 PROCEDURE — 3051F HG A1C>EQUAL 7.0%<8.0%: CPT | Performed by: INTERNAL MEDICINE

## 2025-08-12 RX ORDER — ACYCLOVIR 400 MG/1
1 TABLET ORAL CONTINUOUS
Qty: 1 EACH | Refills: 1 | Status: SHIPPED | OUTPATIENT
Start: 2025-08-12

## 2025-08-12 RX ORDER — ACYCLOVIR 400 MG/1
1 TABLET ORAL
Qty: 3 EACH | Refills: 12 | Status: SHIPPED | OUTPATIENT
Start: 2025-08-12

## 2025-08-12 RX ORDER — CEFUROXIME AXETIL 250 MG/1
250 TABLET ORAL 2 TIMES DAILY
Qty: 14 TABLET | Refills: 0 | Status: SHIPPED | OUTPATIENT
Start: 2025-08-12 | End: 2025-08-19

## 2025-08-12 SDOH — ECONOMIC STABILITY: FOOD INSECURITY: WITHIN THE PAST 12 MONTHS, YOU WORRIED THAT YOUR FOOD WOULD RUN OUT BEFORE YOU GOT MONEY TO BUY MORE.: NEVER TRUE

## 2025-08-12 SDOH — ECONOMIC STABILITY: FOOD INSECURITY: WITHIN THE PAST 12 MONTHS, THE FOOD YOU BOUGHT JUST DIDN'T LAST AND YOU DIDN'T HAVE MONEY TO GET MORE.: NEVER TRUE

## 2025-08-12 ASSESSMENT — ENCOUNTER SYMPTOMS
SHORTNESS OF BREATH: 0
ALLERGIC/IMMUNOLOGIC NEGATIVE: 1
EYES NEGATIVE: 1
CHEST TIGHTNESS: 0
WHEEZING: 0
SORE THROAT: 0
RHINORRHEA: 0
CONSTIPATION: 0
VOMITING: 0
NAUSEA: 0
COUGH: 0
DIARRHEA: 0
TROUBLE SWALLOWING: 0

## 2025-08-12 ASSESSMENT — PATIENT HEALTH QUESTIONNAIRE - PHQ9
2. FEELING DOWN, DEPRESSED OR HOPELESS: NOT AT ALL
1. LITTLE INTEREST OR PLEASURE IN DOING THINGS: NOT AT ALL
SUM OF ALL RESPONSES TO PHQ QUESTIONS 1-9: 0

## 2025-08-13 ENCOUNTER — PATIENT MESSAGE (OUTPATIENT)
Dept: PRIMARY CARE CLINIC | Age: 80
End: 2025-08-13

## 2025-08-13 LAB
ALBUMIN SERPL-MCNC: 4.5 G/DL (ref 3.4–5)
ALBUMIN/GLOB SERPL: 1.7 {RATIO} (ref 1.1–2.2)
ALP SERPL-CCNC: 94 U/L (ref 40–129)
ALT SERPL-CCNC: 16 U/L (ref 10–40)
ANION GAP SERPL CALCULATED.3IONS-SCNC: 14 MMOL/L (ref 3–16)
AST SERPL-CCNC: 21 U/L (ref 15–37)
BILIRUB SERPL-MCNC: 0.5 MG/DL (ref 0–1)
BUN SERPL-MCNC: 24 MG/DL (ref 7–20)
CALCIUM SERPL-MCNC: 10.1 MG/DL (ref 8.3–10.6)
CHLORIDE SERPL-SCNC: 102 MMOL/L (ref 99–110)
CHOLEST SERPL-MCNC: 164 MG/DL (ref 0–199)
CO2 SERPL-SCNC: 23 MMOL/L (ref 21–32)
CREAT SERPL-MCNC: 1.3 MG/DL (ref 0.6–1.2)
GFR SERPLBLD CREATININE-BSD FMLA CKD-EPI: 41 ML/MIN/{1.73_M2}
GLUCOSE SERPL-MCNC: 140 MG/DL (ref 70–99)
HDLC SERPL-MCNC: 53 MG/DL (ref 40–60)
LDLC SERPL CALC-MCNC: 85 MG/DL
POTASSIUM SERPL-SCNC: 4.8 MMOL/L (ref 3.5–5.1)
PROT SERPL-MCNC: 7.2 G/DL (ref 6.4–8.2)
SODIUM SERPL-SCNC: 139 MMOL/L (ref 136–145)
TRIGL SERPL-MCNC: 129 MG/DL (ref 0–150)
VLDLC SERPL CALC-MCNC: 26 MG/DL

## 2025-08-14 ENCOUNTER — PATIENT MESSAGE (OUTPATIENT)
Dept: PRIMARY CARE CLINIC | Age: 80
End: 2025-08-14

## 2025-08-14 ENCOUNTER — TELEPHONE (OUTPATIENT)
Dept: PRIMARY CARE CLINIC | Age: 80
End: 2025-08-14

## 2025-08-14 DIAGNOSIS — U07.1 COVID-19 VIRUS INFECTION: Primary | ICD-10-CM

## 2025-08-15 LAB
BACTERIA UR CULT: ABNORMAL
ORGANISM: ABNORMAL

## 2025-09-03 ENCOUNTER — PATIENT MESSAGE (OUTPATIENT)
Dept: CARDIOLOGY CLINIC | Age: 80
End: 2025-09-03

## 2025-09-03 RX ORDER — SPIRONOLACTONE 25 MG/1
25 TABLET ORAL DAILY
COMMUNITY
End: 2025-09-03 | Stop reason: SDUPTHER

## 2025-09-03 RX ORDER — SPIRONOLACTONE 25 MG/1
25 TABLET ORAL DAILY
Qty: 30 TABLET | Refills: 0 | Status: SHIPPED | OUTPATIENT
Start: 2025-09-03